# Patient Record
Sex: FEMALE | Race: WHITE | Employment: OTHER | URBAN - METROPOLITAN AREA
[De-identification: names, ages, dates, MRNs, and addresses within clinical notes are randomized per-mention and may not be internally consistent; named-entity substitution may affect disease eponyms.]

---

## 2017-01-03 ENCOUNTER — HOSPITAL ENCOUNTER (OUTPATIENT)
Dept: SLEEP CENTER | Age: 47
Discharge: HOME | End: 2017-02-01
Attending: NURSE PRACTITIONER | Admitting: NURSE PRACTITIONER

## 2018-07-23 ENCOUNTER — INITIAL CONSULT (OUTPATIENT)
Dept: CARDIOLOGY CLINIC | Age: 48
End: 2018-07-23

## 2018-07-23 VITALS
HEIGHT: 63 IN | WEIGHT: 258.6 LBS | SYSTOLIC BLOOD PRESSURE: 116 MMHG | BODY MASS INDEX: 45.82 KG/M2 | DIASTOLIC BLOOD PRESSURE: 68 MMHG | HEART RATE: 93 BPM

## 2018-07-23 DIAGNOSIS — Z72.0 TOBACCO ABUSE: Chronic | ICD-10-CM

## 2018-07-23 DIAGNOSIS — R07.2 PRECORDIAL PAIN: ICD-10-CM

## 2018-07-23 DIAGNOSIS — G47.30 SLEEP APNEA, UNSPECIFIED TYPE: ICD-10-CM

## 2018-07-23 DIAGNOSIS — R06.02 SOB (SHORTNESS OF BREATH): ICD-10-CM

## 2018-07-23 DIAGNOSIS — Z76.89 ESTABLISHING CARE WITH NEW DOCTOR, ENCOUNTER FOR: Primary | ICD-10-CM

## 2018-07-23 PROCEDURE — G8427 DOCREV CUR MEDS BY ELIG CLIN: HCPCS | Performed by: INTERNAL MEDICINE

## 2018-07-23 PROCEDURE — G8417 CALC BMI ABV UP PARAM F/U: HCPCS | Performed by: INTERNAL MEDICINE

## 2018-07-23 PROCEDURE — 93000 ELECTROCARDIOGRAM COMPLETE: CPT | Performed by: INTERNAL MEDICINE

## 2018-07-23 PROCEDURE — 99204 OFFICE O/P NEW MOD 45 MIN: CPT | Performed by: INTERNAL MEDICINE

## 2018-07-23 PROCEDURE — 3017F COLORECTAL CA SCREEN DOC REV: CPT | Performed by: INTERNAL MEDICINE

## 2018-07-23 RX ORDER — POTASSIUM CHLORIDE 20 MEQ/1
20 TABLET, EXTENDED RELEASE ORAL DAILY
Qty: 60 TABLET | Refills: 3 | Status: SHIPPED | OUTPATIENT
Start: 2018-07-23 | End: 2022-02-08

## 2018-07-23 RX ORDER — FUROSEMIDE 40 MG/1
40 TABLET ORAL 2 TIMES DAILY
Qty: 60 TABLET | Refills: 3 | Status: SHIPPED | OUTPATIENT
Start: 2018-07-23 | End: 2022-02-08

## 2018-07-23 NOTE — PROGRESS NOTES
LIGATION       Family History   Problem Relation Age of Onset    Cancer Mother     Cancer Father      Social History   Substance Use Topics    Smoking status: Current Every Day Smoker     Packs/day: 1.00     Years: 30.00     Types: Cigarettes    Smokeless tobacco: Never Used    Alcohol use No      Comment: occasional        Review of Systems:   · Constitutional: No Fever or Weight Loss   · Eyes: No Decreased Vision  · ENT: No Headaches, Hearing Loss or Vertigo  · Cardiovascular: as per note above   · Respiratory: No cough or wheezing and as per note above. Positive for sleep apnea  · Gastrointestinal: No abdominal pain, appetite loss, blood in stools, constipation, diarrhea or heartburn  · Genitourinary: No dysuria, trouble voiding, or hematuria  · Musculoskeletal:  None  · Integumentary: No rash or pruritis  · Neurological: No TIA or stroke symptoms  · Psychiatric: No anxiety or depression  · Endocrine: No malaise, fatigue or temperature intolerance  · Hematologic/Lymphatic: No bleeding problems, blood clots or swollen lymph nodes  · Allergic/Immunologic: No nasal congestion or hives    Objective:      Physical Exam:  /68   Pulse 93   Ht 5' 3\" (1.6 m)   Wt 258 lb 9.6 oz (117.3 kg)   BMI 45.81 kg/m²   Wt Readings from Last 3 Encounters:   07/23/18 258 lb 9.6 oz (117.3 kg)   05/29/18 246 lb (111.6 kg)   04/09/18 243 lb (110.2 kg)     Body mass index is 45.81 kg/m². Vitals:    07/23/18 1431   BP: 116/68   Pulse: 93        General Appearance:  No distress, conversant  Constitutional:  Well developed, Well nourished, No acute distress, Non-toxic appearance. HENT:  Normocephalic, Atraumatic, Bilateral external ears normal, Oropharynx moist, No oral exudates, Nose normal. Neck- Normal range of motion, No tenderness, Supple, No stridor,no apical-carotid delay  Eyes:  PERRL, EOMI, Conjunctiva normal, No discharge.    Respiratory:  Normal breath sounds, No respiratory distress, No wheezing, No chest

## 2018-08-02 ENCOUNTER — TELEPHONE (OUTPATIENT)
Dept: CARDIOLOGY CLINIC | Age: 48
End: 2018-08-02

## 2018-08-22 ENCOUNTER — TELEPHONE (OUTPATIENT)
Dept: CARDIOLOGY CLINIC | Age: 48
End: 2018-08-22

## 2018-09-21 ENCOUNTER — PROCEDURE VISIT (OUTPATIENT)
Dept: CARDIOLOGY CLINIC | Age: 48
End: 2018-09-21

## 2018-09-21 DIAGNOSIS — Z76.89 ESTABLISHING CARE WITH NEW DOCTOR, ENCOUNTER FOR: ICD-10-CM

## 2018-09-21 DIAGNOSIS — G47.30 SLEEP APNEA, UNSPECIFIED TYPE: ICD-10-CM

## 2018-09-21 DIAGNOSIS — R07.2 PRECORDIAL PAIN: ICD-10-CM

## 2018-09-21 DIAGNOSIS — R06.02 SOB (SHORTNESS OF BREATH): ICD-10-CM

## 2018-09-21 DIAGNOSIS — Z72.0 TOBACCO ABUSE: Chronic | ICD-10-CM

## 2018-09-21 LAB
LV EF: 77 %
LVEF MODALITY: NORMAL

## 2018-09-21 PROCEDURE — 93017 CV STRESS TEST TRACING ONLY: CPT | Performed by: INTERNAL MEDICINE

## 2018-09-21 PROCEDURE — A9500 TC99M SESTAMIBI: HCPCS | Performed by: INTERNAL MEDICINE

## 2018-09-21 PROCEDURE — 93016 CV STRESS TEST SUPVJ ONLY: CPT | Performed by: INTERNAL MEDICINE

## 2018-09-21 PROCEDURE — 78452 HT MUSCLE IMAGE SPECT MULT: CPT | Performed by: INTERNAL MEDICINE

## 2018-09-21 PROCEDURE — 93018 CV STRESS TEST I&R ONLY: CPT | Performed by: INTERNAL MEDICINE

## 2018-10-04 ENCOUNTER — TELEPHONE (OUTPATIENT)
Dept: CARDIOLOGY CLINIC | Age: 48
End: 2018-10-04

## 2018-10-15 ENCOUNTER — TELEPHONE (OUTPATIENT)
Dept: CARDIOLOGY CLINIC | Age: 48
End: 2018-10-15

## 2019-11-07 ENCOUNTER — HOSPITAL ENCOUNTER (OUTPATIENT)
Dept: SLEEP CENTER | Age: 49
Discharge: HOME | End: 2019-11-07
Payer: COMMERCIAL

## 2019-11-07 VITALS
HEIGHT: 63 IN | DIASTOLIC BLOOD PRESSURE: 60 MMHG | HEART RATE: 83 BPM | OXYGEN SATURATION: 96 % | BODY MASS INDEX: 46.95 KG/M2 | SYSTOLIC BLOOD PRESSURE: 126 MMHG | WEIGHT: 265 LBS

## 2019-11-07 DIAGNOSIS — R06.02 SOB (SHORTNESS OF BREATH) ON EXERTION: ICD-10-CM

## 2019-11-07 DIAGNOSIS — Z72.0 TOBACCO ABUSE: Chronic | ICD-10-CM

## 2019-11-07 DIAGNOSIS — G47.30 SLEEP APNEA, UNSPECIFIED TYPE: ICD-10-CM

## 2019-11-07 DIAGNOSIS — D64.9 ANEMIA, UNSPECIFIED TYPE: ICD-10-CM

## 2019-11-07 DIAGNOSIS — G25.81 RESTLESS LEG SYNDROME: ICD-10-CM

## 2019-11-07 DIAGNOSIS — R06.02 SOB (SHORTNESS OF BREATH): Primary | ICD-10-CM

## 2019-11-07 DIAGNOSIS — G47.19 EXCESSIVE DAYTIME SLEEPINESS: ICD-10-CM

## 2019-11-07 PROCEDURE — 99211 OFF/OP EST MAY X REQ PHY/QHP: CPT | Performed by: INTERNAL MEDICINE

## 2019-11-07 PROCEDURE — 99205 OFFICE O/P NEW HI 60 MIN: CPT | Performed by: INTERNAL MEDICINE

## 2019-11-07 RX ORDER — ROPINIROLE 0.25 MG/1
TABLET, FILM COATED ORAL
Qty: 45 TABLET | Refills: 1 | Status: SHIPPED | OUTPATIENT
Start: 2019-11-07 | End: 2020-02-07 | Stop reason: SDUPTHER

## 2019-11-07 RX ORDER — MELATONIN
Refills: 2 | COMMUNITY
Start: 2019-10-11

## 2019-11-07 RX ORDER — GABAPENTIN 100 MG/1
CAPSULE ORAL
Refills: 2 | COMMUNITY
Start: 2019-10-11 | End: 2022-02-08

## 2019-11-07 RX ORDER — LEVOTHYROXINE SODIUM 0.05 MG/1
TABLET ORAL NIGHTLY
Refills: 2 | COMMUNITY
Start: 2019-10-17

## 2019-11-07 RX ORDER — MELOXICAM 15 MG/1
TABLET ORAL
Refills: 2 | COMMUNITY
Start: 2019-10-11

## 2019-11-07 RX ORDER — ALBUTEROL SULFATE 90 UG/1
AEROSOL, METERED RESPIRATORY (INHALATION)
Refills: 0 | COMMUNITY
Start: 2019-10-17 | End: 2022-02-08

## 2019-11-07 ASSESSMENT — SLEEP AND FATIGUE QUESTIONNAIRES
HOW LIKELY ARE YOU TO NOD OFF OR FALL ASLEEP WHILE LYING DOWN TO REST IN THE AFTERNOON WHEN CIRCUMSTANCES PERMIT: 3
HOW LIKELY ARE YOU TO NOD OFF OR FALL ASLEEP WHILE SITTING QUIETLY AFTER LUNCH WITHOUT ALCOHOL: 2
HOW LIKELY ARE YOU TO NOD OFF OR FALL ASLEEP IN A CAR, WHILE STOPPED FOR A FEW MINUTES IN TRAFFIC: 0
ESS TOTAL SCORE: 12
HOW LIKELY ARE YOU TO NOD OFF OR FALL ASLEEP WHILE WATCHING TV: 3
HOW LIKELY ARE YOU TO NOD OFF OR FALL ASLEEP WHILE SITTING AND TALKING TO SOMEONE: 2
HOW LIKELY ARE YOU TO NOD OFF OR FALL ASLEEP WHEN YOU ARE A PASSENGER IN A CAR FOR AN HOUR WITHOUT A BREAK: 2
HOW LIKELY ARE YOU TO NOD OFF OR FALL ASLEEP WHILE SITTING INACTIVE IN A PUBLIC PLACE: 0
HOW LIKELY ARE YOU TO NOD OFF OR FALL ASLEEP WHILE SITTING AND READING: 0

## 2019-11-14 ENCOUNTER — HOSPITAL ENCOUNTER (OUTPATIENT)
Dept: SLEEP CENTER | Age: 49
Discharge: HOME | End: 2019-11-14
Payer: COMMERCIAL

## 2019-11-14 DIAGNOSIS — G47.30 SLEEP APNEA, UNSPECIFIED TYPE: ICD-10-CM

## 2019-11-14 PROCEDURE — 95810 POLYSOM 6/> YRS 4/> PARAM: CPT | Performed by: INTERNAL MEDICINE

## 2019-11-14 PROCEDURE — 95810 POLYSOM 6/> YRS 4/> PARAM: CPT

## 2019-11-21 LAB — STATUS: NORMAL

## 2019-11-27 ENCOUNTER — HOSPITAL ENCOUNTER (OUTPATIENT)
Dept: SLEEP CENTER | Age: 49
Discharge: HOME | End: 2019-11-27
Payer: COMMERCIAL

## 2019-11-27 DIAGNOSIS — G25.81 RESTLESS LEG SYNDROME: ICD-10-CM

## 2019-11-27 DIAGNOSIS — G47.19 EXCESSIVE DAYTIME SLEEPINESS: ICD-10-CM

## 2019-11-27 DIAGNOSIS — G47.33 OSA (OBSTRUCTIVE SLEEP APNEA): ICD-10-CM

## 2019-11-27 DIAGNOSIS — D64.9 ANEMIA, UNSPECIFIED TYPE: ICD-10-CM

## 2019-11-27 DIAGNOSIS — Z72.0 TOBACCO ABUSE: Chronic | ICD-10-CM

## 2019-11-27 DIAGNOSIS — R06.02 SOB (SHORTNESS OF BREATH): ICD-10-CM

## 2019-11-27 PROCEDURE — 9990000010 HC NO CHARGE VISIT: Performed by: INTERNAL MEDICINE

## 2019-11-27 PROCEDURE — 99215 OFFICE O/P EST HI 40 MIN: CPT | Performed by: INTERNAL MEDICINE

## 2019-11-27 RX ORDER — CETIRIZINE HYDROCHLORIDE 10 MG/1
TABLET ORAL
Refills: 1 | COMMUNITY
Start: 2019-11-22 | End: 2022-02-08

## 2019-11-27 RX ORDER — TIOTROPIUM BROMIDE INHALATION SPRAY 3.12 UG/1
SPRAY, METERED RESPIRATORY (INHALATION)
Refills: 2 | COMMUNITY
Start: 2019-11-22

## 2019-11-27 ASSESSMENT — ENCOUNTER SYMPTOMS
COUGH: 0
EYE ITCHING: 0
ABDOMINAL PAIN: 0
ABDOMINAL DISTENTION: 0
SHORTNESS OF BREATH: 0
BACK PAIN: 0
EYE DISCHARGE: 0

## 2020-02-07 RX ORDER — ROPINIROLE 0.25 MG/1
TABLET, FILM COATED ORAL
Qty: 45 TABLET | Refills: 1 | Status: SHIPPED | OUTPATIENT
Start: 2020-02-07 | End: 2020-05-20 | Stop reason: SDUPTHER

## 2020-03-03 ENCOUNTER — HOSPITAL ENCOUNTER (OUTPATIENT)
Dept: SLEEP CENTER | Age: 50
Discharge: HOME | End: 2020-03-03
Payer: COMMERCIAL

## 2020-03-03 PROCEDURE — 95811 POLYSOM 6/>YRS CPAP 4/> PARM: CPT

## 2020-03-03 PROCEDURE — 95811 POLYSOM 6/>YRS CPAP 4/> PARM: CPT | Performed by: INTERNAL MEDICINE

## 2020-03-04 LAB — STATUS: NORMAL

## 2020-03-06 ENCOUNTER — TELEPHONE (OUTPATIENT)
Dept: SLEEP CENTER | Age: 50
End: 2020-03-06

## 2020-05-22 RX ORDER — ROPINIROLE 0.25 MG/1
TABLET, FILM COATED ORAL
Qty: 45 TABLET | Refills: 1 | Status: SHIPPED | OUTPATIENT
Start: 2020-05-22 | End: 2020-08-19 | Stop reason: SDUPTHER

## 2020-06-15 ENCOUNTER — TELEPHONE (OUTPATIENT)
Dept: PULMONOLOGY | Age: 50
End: 2020-06-15

## 2020-07-09 ENCOUNTER — HOSPITAL ENCOUNTER (OUTPATIENT)
Dept: SLEEP CENTER | Age: 50
Discharge: HOME | End: 2020-07-09
Payer: COMMERCIAL

## 2020-07-09 PROCEDURE — 99215 OFFICE O/P EST HI 40 MIN: CPT | Performed by: INTERNAL MEDICINE

## 2020-07-09 PROCEDURE — 9990000010 HC NO CHARGE VISIT

## 2020-07-09 RX ORDER — TRAMADOL HYDROCHLORIDE 50 MG/1
50 TABLET ORAL EVERY 12 HOURS
Status: ON HOLD | COMMUNITY
Start: 2020-05-14 | End: 2022-02-22 | Stop reason: HOSPADM

## 2020-07-09 ASSESSMENT — SLEEP AND FATIGUE QUESTIONNAIRES
HOW LIKELY ARE YOU TO NOD OFF OR FALL ASLEEP WHILE SITTING QUIETLY AFTER LUNCH WITHOUT ALCOHOL: 1
HOW LIKELY ARE YOU TO NOD OFF OR FALL ASLEEP WHEN YOU ARE A PASSENGER IN A CAR FOR AN HOUR WITHOUT A BREAK: 0
ESS TOTAL SCORE: 9
HOW LIKELY ARE YOU TO NOD OFF OR FALL ASLEEP WHILE SITTING INACTIVE IN A PUBLIC PLACE: 0
HOW LIKELY ARE YOU TO NOD OFF OR FALL ASLEEP WHILE WATCHING TV: 2
HOW LIKELY ARE YOU TO NOD OFF OR FALL ASLEEP WHILE SITTING AND TALKING TO SOMEONE: 1
HOW LIKELY ARE YOU TO NOD OFF OR FALL ASLEEP WHILE SITTING AND READING: 2
HOW LIKELY ARE YOU TO NOD OFF OR FALL ASLEEP IN A CAR, WHILE STOPPED FOR A FEW MINUTES IN TRAFFIC: 0
HOW LIKELY ARE YOU TO NOD OFF OR FALL ASLEEP WHILE LYING DOWN TO REST IN THE AFTERNOON WHEN CIRCUMSTANCES PERMIT: 3

## 2020-07-09 ASSESSMENT — ENCOUNTER SYMPTOMS
SHORTNESS OF BREATH: 0
COUGH: 0
EYE ITCHING: 0
EYE DISCHARGE: 0
ABDOMINAL DISTENTION: 0
BACK PAIN: 0
ABDOMINAL PAIN: 0

## 2020-07-09 NOTE — PROGRESS NOTES
[facility]  1970  Referring Provider: Dr. Alin Aguilar    Subjective:     Chief Complaint   Patient presents with    Sleep Apnea    3 Month Follow-Up       HPI  Em Pena is a 44 y.o. female is doing a telephone visit. She has been diagnosed with mild ROXI with EDS. She is on a Auto CPAP which she is using it off and on for about 3 hours. She has a nasal mask. She has no loss of weight. She has difficulty tolerating the nasal mask. She is not sleepy during the day time. Her 2 week download data showed that her residual AHI is 7.4 and leak is 35.9 and 95th percentile pressure is 11.7. She has a 41 pk yr smoking and she is still smoking 1 pk per day. She had no PFT's. She has no symptoms. She has RLS for which she is on Requip and it is helping her. She has no blood work.     Current Outpatient Medications   Medication Sig Dispense Refill    traMADol (ULTRAM) 50 MG tablet       rOPINIRole (REQUIP) 0.25 MG tablet Take 1 tablet orally 2 hours before bed time and then increase by 1 tablet per week to a max 8 tabs per day 45 tablet 1    cetirizine (ZYRTEC) 10 MG tablet   1    SPIRIVA RESPIMAT 2.5 MCG/ACT AERS inhaler   2    albuterol sulfate  (90 Base) MCG/ACT inhaler   0    Cholecalciferol (VITAMIN D3) 25 MCG (1000 UT) TABS   2    gabapentin (NEURONTIN) 100 MG capsule   2    levothyroxine (SYNTHROID) 50 MCG tablet   2    meloxicam (MOBIC) 15 MG tablet   2    FLUoxetine (PROZAC) 20 MG capsule Take 20 mg by mouth daily      ibuprofen (ADVIL;MOTRIN) 600 MG tablet Take 1 tablet by mouth every 6 hours as needed for Pain or Fever 60 tablet 0    acetaminophen (TYLENOL) 325 MG tablet Take 2 tablets by mouth every 6 hours as needed for Pain 60 tablet 0    furosemide (LASIX) 40 MG tablet Take 1 tablet by mouth 2 times daily 60 tablet 3    potassium chloride (KLOR-CON M) 20 MEQ extended release tablet Take 1 tablet by mouth daily 60 tablet 3    zolpidem (AMBIEN) 5 MG tablet Take 5 mg by mouth nightly as needed for Sleep. Vero Rife LORazepam (ATIVAN) 0.5 MG tablet Take 0.5 mg by mouth every 6 hours as needed for Anxiety. Vero Rife ibuprofen (ADVIL;MOTRIN) 600 MG tablet Take 1 tablet by mouth every 8 hours as needed for Pain 30 tablet 0    Compression Stockings MISC by Does not apply route 1 each 0    Spacer/Aero Chamber Mouthpiece MISC Use with inhaler as needed. 1 each 0     No current facility-administered medications for this encounter.         No Known Allergies    Past Medical History:   Diagnosis Date    Anxiety     COPD [facility]chronic obstructive pulmonary disease) ([facility])     Panic attacks     Separation anxiety     TIA (transient ischemic attack)        Past Surgical History:   Procedure Laterality Date     SECTION      CHOLECYSTECTOMY      HYSTERECTOMY      KNEE CARTILAGE SURGERY      R knee 2002    TUBAL LIGATION         Social History     Socioeconomic History    Marital status:      Spouse name: Not on file    Number of children: Not on file    Years of education: Not on file    Highest education level: Not on file   Occupational History    Not on file   Social Needs    Financial resource strain: Not on file    Food insecurity     Worry: Not on file     Inability: Not on file    Transportation needs     Medical: Not on file     Non-medical: Not on file   Tobacco Use    Smoking status: Current Every Day Smoker     Packs/day: 1.00     Years: 30.00     Pack years: 30.00     Types: Cigarettes    Smokeless tobacco: Never Used   Substance and Sexual Activity    Alcohol use: No     Comment: occasional    Drug use: No    Sexual activity: Not on file   Lifestyle    Physical activity     Days per week: Not on file     Minutes per session: Not on file    Stress: Not on file   Relationships    Social connections     Talks on phone: Not on file     Gets together: Not on file     Attends L[employer]-day service: Not on file     Active member of club or organization: Not on file Attends meetings of clubs or organizations: Not on file     Relationship status: Not on file    Intimate partner violence     Fear of current or ex partner: Not on file     Emotionally abused: Not on file     Physically abused: Not on file     Forced sexual activity: Not on file   Other Topics Concern    Not on file   Social History Narrative    Not on file       Review of Systems   Constitutional: Positive for fatigue. HENT: Negative for congestion and postnasal drip. Eyes: Negative for discharge and itching. Respiratory: Negative for cough and shortness of breath. Cardiovascular: Negative for chest pain and leg swelling. Gastrointestinal: Negative for abdominal distention and abdominal pain. Endocrine: Negative for cold intolerance and heat intolerance. Genitourinary: Negative for enuresis and frequency. Musculoskeletal: Negative for arthralgias and back pain. Allergic/Immunologic: Negative for environmental allergies and food allergies. Neurological: Negative for light-headedness and headaches. Hematological: Negative for adenopathy. Psychiatric/Behavioral: Negative for agitation and behavioral problems. Objective: There were no vitals taken for this visit. There is no height or weight on file to calculate BMI. Sleep Medicine 7/9/2020 11/7/2019   Sitting and reading 2 0   Watching TV 2 3   Sitting, inactive in a public place (e.g. a theatre or a meeting) 0 0   As a passenger in a car for an hour without a break 0 2   Lying down to rest in the afternoon when circumstances permit 3 3   Sitting and talking to someone 1 2   Sitting quietly after a lunch without alcohol 1 2   In a car, while stopped for a few minutes in traffic 0 0   Total score 9 12   Neck circumference - 18.5     {MALLAMPATI:3    Physical Exam  Vitals signs reviewed. Constitutional:       Appearance: Normal appearance. Comments: Morbid obesity   HENT:      Head: Normocephalic and atraumatic.       Nose: Nose normal.      Mouth/Throat:      Mouth: Mucous membranes are moist.   Eyes:      Extraocular Movements: Extraocular movements intact. Pupils: Pupils are equal, round, and reactive to light. Neck:      Musculoskeletal: Normal range of motion and neck supple. Cardiovascular:      Rate and Rhythm: Normal rate and regular rhythm. Pulses: Normal pulses. Heart sounds: Normal heart sounds. Pulmonary:      Effort: Pulmonary effort is normal.      Breath sounds: Normal breath sounds. Abdominal:      General: Abdomen is flat. Palpations: Abdomen is soft. Musculoskeletal: Normal range of motion. Skin:     General: Skin is warm and dry. Neurological:      General: No focal deficit present. Mental Status: She is alert and oriented to person, place, and time. Psychiatric:         Mood and Affect: Mood normal.         Behavior: Behavior normal.         Radiology: none    Assessment and Plan     Problem List        Pulmonary Problems    SOB (shortness of breath)     Quit smoking  Loose weight  PFT         SOB (shortness of breath) on exertion    Relevant Orders    Full PFT Study With Bronchodilator    ROXI (obstructive sleep apnea)     Advised to be compliant with the CPAP  Mask fitting trial  Loose weight            Other    Tobacco abuse (Chronic)     Advised to quit smoking         Relevant Orders    Full PFT Study With Bronchodilator    Excessive daytime sleepiness     Advised to be compliant with the CPAP  Loose weight         Anemia     I'll do the blood work for the sec causes of RLS         Restless leg syndrome     Advised to be compliant with Requip                    No follow-ups on file.      Progress notes sent to the referring Provider    Viola Sutton MD  7/9/2020  9:51 AM

## 2020-08-20 RX ORDER — ROPINIROLE 0.25 MG/1
TABLET, FILM COATED ORAL
Qty: 45 TABLET | Refills: 1 | Status: SHIPPED | OUTPATIENT
Start: 2020-08-20 | End: 2020-09-16

## 2020-09-16 RX ORDER — ROPINIROLE 0.25 MG/1
TABLET, FILM COATED ORAL
Qty: 45 TABLET | Refills: 1 | Status: SHIPPED | OUTPATIENT
Start: 2020-09-16 | End: 2020-12-21 | Stop reason: SDUPTHER

## 2020-12-21 NOTE — TELEPHONE ENCOUNTER
Patient called asking for a refill of Requip sent to [facility] on [address].

## 2020-12-22 RX ORDER — ROPINIROLE 0.25 MG/1
TABLET, FILM COATED ORAL
Qty: 45 TABLET | Refills: 1 | Status: SHIPPED | OUTPATIENT
Start: 2020-12-22 | End: 2021-02-01 | Stop reason: SDUPTHER

## 2021-02-01 NOTE — TELEPHONE ENCOUNTER
Patient called stating that she needs a refill of the Requip, BUT she states that she is up to 0.75mg nightly instead of the 0.25mg that was called in like last time.

## 2021-02-02 RX ORDER — ROPINIROLE 0.25 MG/1
TABLET, FILM COATED ORAL
Qty: 45 TABLET | Refills: 1 | Status: SHIPPED | OUTPATIENT
Start: 2021-02-02 | End: 2021-04-08

## 2021-04-08 RX ORDER — ROPINIROLE 0.25 MG/1
TABLET, FILM COATED ORAL
Qty: 90 TABLET | Refills: 1 | Status: SHIPPED | OUTPATIENT
Start: 2021-04-08 | End: 2022-03-08 | Stop reason: SDUPTHER

## 2021-06-18 ENCOUNTER — OFFICE VISIT (OUTPATIENT)
Dept: ORTHOPEDIC SURGERY | Age: 51
End: 2021-06-18
Payer: MEDICARE

## 2021-06-18 VITALS
BODY MASS INDEX: 45.18 KG/M2 | HEART RATE: 80 BPM | OXYGEN SATURATION: 97 % | HEIGHT: 63 IN | WEIGHT: 255 LBS | RESPIRATION RATE: 16 BRPM

## 2021-06-18 DIAGNOSIS — M17.11 PRIMARY OSTEOARTHRITIS OF RIGHT KNEE: Primary | ICD-10-CM

## 2021-06-18 PROCEDURE — 99203 OFFICE O/P NEW LOW 30 MIN: CPT | Performed by: PHYSICIAN ASSISTANT

## 2021-06-18 PROCEDURE — 4004F PT TOBACCO SCREEN RCVD TLK: CPT | Performed by: PHYSICIAN ASSISTANT

## 2021-06-18 PROCEDURE — G8417 CALC BMI ABV UP PARAM F/U: HCPCS | Performed by: PHYSICIAN ASSISTANT

## 2021-06-18 PROCEDURE — 3017F COLORECTAL CA SCREEN DOC REV: CPT | Performed by: PHYSICIAN ASSISTANT

## 2021-06-18 PROCEDURE — G8427 DOCREV CUR MEDS BY ELIG CLIN: HCPCS | Performed by: PHYSICIAN ASSISTANT

## 2021-06-18 NOTE — PROGRESS NOTES
[facility]    HPI:  Adan Rust is a 46 y.o. female who presents for evaluation of her right knee pain. Patient states she has pain in her right knee for the last couple of months. She rates her pain 5-6 and describes as achy sharp sensation. Patient states she has tried tramadol, meloxicam, ice, heat which does help at times. She states standing and walking worsen her pain just.  She states she continues to note popping and cracking sensations of her right knee. She states she did have previous knee arthroscopy completed at least 10 years ago by Dr. Suresh Marcos. She denies any new injury to her right knee. The patient was referred by Den Chen MD for evaluation of patient's right knee pain.   Thank you for the referral.    Past Medical History:   Diagnosis Date    Anxiety     COPD (chronic obstructive pulmonary disease) (H[facility])     Panic attacks     Separation anxiety     TIA (transient ischemic attack)      Past Surgical History:   Procedure Laterality Date     SECTION      CHOLECYSTECTOMY      HYSTERECTOMY      KNEE CARTILAGE SURGERY      R knee 2002    TUBAL LIGATION        Family History   Problem Relation Age of Onset    Cancer Mother     Cancer Father      Social History     Socioeconomic History    Marital status:      Spouse name: None    Number of children: None    Years of education: None    Highest education level: None   Occupational History    None   Tobacco Use    Smoking status: Current Every Day Smoker     Packs/day: 1.00     Years: 30.00     Pack years: 30.00     Types: Cigarettes    Smokeless tobacco: Never Used   Substance and Sexual Activity    Alcohol use: No     Comment: occasional    Drug use: No    Sexual activity: None   Other Topics Concern    None   Social History Narrative    None     Social Determinants of Health     Financial Resource Strain:     Difficulty of Paying Living Expenses:    Food (ATIVAN) 0.5 MG tablet Take 0.5 mg by mouth every 6 hours as needed for Anxiety. Duwaine Blanks ibuprofen (ADVIL;MOTRIN) 600 MG tablet Take 1 tablet by mouth every 8 hours as needed for Pain 30 tablet 0    Compression Stockings MISC by Does not apply route 1 each 0    Spacer/Aero Chamber Mouthpiece MISC Use with inhaler as needed. 1 each 0     No current facility-administered medications for this visit. No Known Allergies    Review of Systems:   Review of Systems   Constitutional: Negative. HENT: Negative. Eyes: Negative. Respiratory: Negative. Cardiovascular: Negative. Gastrointestinal: Negative. Endocrine: Negative. Genitourinary: Negative. Musculoskeletal: Positive for arthralgias. Skin: Negative. Allergic/Immunologic: Negative. Neurological: Negative. Hematological: Negative. Psychiatric/Behavioral: Negative. Physical Exam:  Pulse 80   Resp 16   Ht 5' 3\" (1.6 m)   Wt 255 lb (115.7 kg)   SpO2 97%   BMI 45.17 kg/m²      The patient can bear weight on the injured extremity. Gen/Psych: Examination reveals a pleasant individual in no acute distress. The patient is oriented to time, place, and person. The patient's mood and affect are appropriate. Patient appears well nourished. Lymph: No obvious lymphedema in right lower extremity     Skin: Intact in right lower extremity with no ulcerations, lesions, rash, erythema. Vascular: There are no obvious varicosities in right lower extremity, sensation present to light touch over right lower extremity. Capillary refill less than 3. Musculoskeletal:  right leg/knee exam:  Inspection:    No erythema or ecchymosis. No swelling noted.   Leg alignment:     neutral  Quadriceps/hamstring atrophy:   no  Knee effusion:    no   ROM:      degrees  Lachman:    no  Ant/Posterior drawer:   no  Lateral patella glide at 30 deg's: 20%  Medial patella glide at 30 deg's: 10mm  Varus laxity at 0 and 30 deg's: no  Valguslaxity at 0 and 30 deg's: no  Tenderness at:   Tender to palpation of the lateral joint line region. Nontender palpation of posterior calf, soft compartments. Knee strength is 5/5 flexion and extension  No pain with hip internal rotation and external rotation. Patient can perform ankle dorsiflexion and plantarflexion. Imagin views of the right knee were obtained today which showed no acute fracture or dislocation. The official read and interpretation of these x-rays will be done by the the [facility]. Please see their impression below. Impression   1. No acute bony abnormality   2. Tricompartmental osteoarthritic changes   3. Joint effusion          Impression:   1. Primary osteoarthritis of the right knee    Plan:   The patient was seen in clinic for evaluation of her right knee pain. Patient states she does have history of previous knee arthroscopy completed at least 10 years ago. Patient states she developed knee pain over the last couple month and states walking and standing worsen her pain. X-ray imaging of the patient's right knee was obtained which showed no acute fracture or dislocation. On physical exam, no erythema or ecchymosis was seen. Patient was tender to palpation of the lateral joint line region of the right knee. Capillary refill less than 3. Sensation intact to light touch. Treatment options were in the patient's right knee pain were discussed including anti-inflammatory use, ice use, bracing, steroid injection, gel injection, and surgery. Patient states she would like to discuss her surgical options further. The patient will follow up with Dr. Shadi Licea.     Continue to weight bear as tolerated  Continue range of motion as tolerated  Ice and elevate as needed  May take Tylenol or Motrin for pain as needed  Follow-up with Dr. Shadi Licea     Please note this report has been partially produced using speech recognition software and may contain errors related to that system including errors in grammar, punctuation, and spelling, as well as words and phrases that may be inappropriate. If there are any questions or concerns please feel free to contact the dictating provider for clarification.

## 2021-06-18 NOTE — PROGRESS NOTES
Patient is a 46year old female that presents to the office with complaints of right knee pain. Onset: 2-3 months with NKI. Pain is rated in office today at a 6/10 globally throughout the knee with patient describing her pain as a sharp. Pain is aggravated by transitioning from seated to standing position and ambulation. Associated sx: grinding within the knee and the knee giving out with increased activity. She has been conservatively treating her symptoms by the use of Meloxicam and Tramadol as prescribed by her PCP, Dr. Lizett Ryan at [facility] along with ice and heat application, rest, and elevation with no reported relief. Hx of previous knee arthroscopy performed several years ago by Dr. Yang Harper on the extremity. She is open to other treatment options at this time.

## 2021-06-20 ASSESSMENT — ENCOUNTER SYMPTOMS
RESPIRATORY NEGATIVE: 1
GASTROINTESTINAL NEGATIVE: 1
EYES NEGATIVE: 1
ALLERGIC/IMMUNOLOGIC NEGATIVE: 1

## 2021-07-20 ENCOUNTER — OFFICE VISIT (OUTPATIENT)
Dept: ORTHOPEDIC SURGERY | Age: 51
End: 2021-07-20
Payer: MEDICARE

## 2021-07-20 VITALS
HEIGHT: 63 IN | WEIGHT: 255 LBS | HEART RATE: 77 BPM | BODY MASS INDEX: 45.18 KG/M2 | OXYGEN SATURATION: 95 % | RESPIRATION RATE: 16 BRPM

## 2021-07-20 DIAGNOSIS — M17.11 PRIMARY OSTEOARTHRITIS OF RIGHT KNEE: Primary | ICD-10-CM

## 2021-07-20 PROCEDURE — G8428 CUR MEDS NOT DOCUMENT: HCPCS | Performed by: ORTHOPAEDIC SURGERY

## 2021-07-20 PROCEDURE — 3017F COLORECTAL CA SCREEN DOC REV: CPT | Performed by: ORTHOPAEDIC SURGERY

## 2021-07-20 PROCEDURE — G8417 CALC BMI ABV UP PARAM F/U: HCPCS | Performed by: ORTHOPAEDIC SURGERY

## 2021-07-20 PROCEDURE — 4004F PT TOBACCO SCREEN RCVD TLK: CPT | Performed by: ORTHOPAEDIC SURGERY

## 2021-07-20 PROCEDURE — 99214 OFFICE O/P EST MOD 30 MIN: CPT | Performed by: ORTHOPAEDIC SURGERY

## 2021-07-20 PROCEDURE — 20610 DRAIN/INJ JOINT/BURSA W/O US: CPT | Performed by: ORTHOPAEDIC SURGERY

## 2021-07-20 NOTE — PROGRESS NOTES
2021   Chief Complaint   Patient presents with    Knee Pain     right knee        History of Present Illness:                             Clau Bauer is a 46 y.o. female who presents today for evaluation of her right knee pain swelling and stiffness. She has previously been seen by the physician assistant referred here for further discussion of treatment options for her degenerative knee condition. She has a remote history of knee arthroscopy performed 10 years ago. She has had ongoing worsening symptoms since then seem to progress in nature especially over the last 6 months. Pain is most severe along the medial joint line and worse with weightbearing activities and deep knee flexion. She has tried to rest and anti-inflammatory medications with no relief of her symptoms. Patient presents to the office today for evaluation of the right knee. Pt states on set of pain a few months ago. Pt states pain today is a 8/10 in the right knee. She has tried tramadol, meloxicam, ice, heat with mild relief. Pt states she has a difficult time going up the stairs and getting in out of the car. Pt states she is in constant stabbing pain along the medial and lateral aspect of the the right knee. Pt states she is ready to discuss a total knee replacement         Medical History  Patient's medications, allergies, past medical, surgical, social and family histories were reviewed and updated as appropriate.     Past Medical History:   Diagnosis Date    Anxiety     COPD (chronic obstructive pulmonary disease) (HCC)     Panic attacks     Separation anxiety     TIA (transient ischemic attack)      Past Surgical History:   Procedure Laterality Date     SECTION      CHOLECYSTECTOMY      HYSTERECTOMY      KNEE CARTILAGE SURGERY      R knee 2002    TUBAL LIGATION       Family History   Problem Relation Age of Onset    Cancer Mother     Cancer Father      Social History     Socioeconomic History  Marital status:      Spouse name: None    Number of children: None    Years of education: None    Highest education level: None   Occupational History    None   Tobacco Use    Smoking status: Current Every Day Smoker     Packs/day: 1.00     Years: 30.00     Pack years: 30.00     Types: Cigarettes    Smokeless tobacco: Never Used   Substance and Sexual Activity    Alcohol use: No     Comment: occasional    Drug use: No    Sexual activity: None   Other Topics Concern    None   Social History Narrative    None     Social Determinants of Health     Financial Resource Strain:     Difficulty of Paying Living Expenses:    Food Insecurity:     Worried About Running Out of Food in the Last Year:     Ran Out of Food in the Last Year:    Transportation Needs:     Lack of Transportation (Medical):      Lack of Transportation (Non-Medical):    Physical Activity:     Days of Exercise per Week:     Minutes of Exercise per Session:    Stress:     Feeling of Stress :    Social Connections:     Frequency of Communication with Friends and Family:     Frequency of Social Gatherings with Friends and Family:     Attends Scientologist Services:     Active Member of Clubs or Organizations:     Attends Club or Organization Meetings:     Marital Status:    Intimate Partner Violence:     Fear of Current or Ex-Partner:     Emotionally Abused:     Physically Abused:     Sexually Abused:      Current Outpatient Medications   Medication Sig Dispense Refill    rOPINIRole (REQUIP) 0.25 MG tablet TAKE 3 TABLETS BY MOUTH 2 HOURS BEFORE BED 90 tablet 1    traMADol (ULTRAM) 50 MG tablet       cetirizine (ZYRTEC) 10 MG tablet   1    SPIRIVA RESPIMAT 2.5 MCG/ACT AERS inhaler   2    albuterol sulfate  (90 Base) MCG/ACT inhaler   0    Cholecalciferol (VITAMIN D3) 25 MCG (1000 UT) TABS   2    gabapentin (NEURONTIN) 100 MG capsule  (Patient not taking: Reported on 6/18/2021)  2    levothyroxine (SYNTHROID) 50 MCG tablet   2    meloxicam (MOBIC) 15 MG tablet   2    ibuprofen (ADVIL;MOTRIN) 600 MG tablet Take 1 tablet by mouth every 6 hours as needed for Pain or Fever 60 tablet 0    acetaminophen (TYLENOL) 325 MG tablet Take 2 tablets by mouth every 6 hours as needed for Pain 60 tablet 0    furosemide (LASIX) 40 MG tablet Take 1 tablet by mouth 2 times daily 60 tablet 3    potassium chloride (KLOR-CON M) 20 MEQ extended release tablet Take 1 tablet by mouth daily 60 tablet 3    FLUoxetine (PROZAC) 20 MG capsule Take 20 mg by mouth daily      zolpidem (AMBIEN) 5 MG tablet Take 5 mg by mouth nightly as needed for Sleep. Buhl Ambrosia LORazepam (ATIVAN) 0.5 MG tablet Take 0.5 mg by mouth every 6 hours as needed for Anxiety. Rere Ambrosia ibuprofen (ADVIL;MOTRIN) 600 MG tablet Take 1 tablet by mouth every 8 hours as needed for Pain 30 tablet 0    Compression Stockings MISC by Does not apply route 1 each 0    Spacer/Aero Chamber Mouthpiece MISC Use with inhaler as needed. 1 each 0     No current facility-administered medications for this visit. No Known Allergies      Review of Systems   Constitutional: Negative for chills and fever. HENT: Negative for congestion and sneezing. Eyes: Negative for pain and redness. Respiratory: Negative for chest tightness, shortness of breath and wheezing. Cardiovascular: Negative for chest pain and palpitations. Gastrointestinal: Negative for vomiting. Musculoskeletal: Positive for arthralgias. Skin: Negative for color change and rash. Neurological: Negative for weakness and numbness. Psychiatric/Behavioral: Negative for agitation. The patient is not nervous/anxious. Examination:  General Exam:  Vitals: Pulse 77   Resp 16   Ht 5' 3\" (1.6 m)   Wt 255 lb (115.7 kg)   SpO2 95%   BMI 45.17 kg/m²    Physical Exam  Vitals and nursing note reviewed. Constitutional:       Appearance: Normal appearance.    HENT:      Head: Normocephalic and atraumatic. Eyes:      Conjunctiva/sclera: Conjunctivae normal.      Pupils: Pupils are equal, round, and reactive to light. Pulmonary:      Effort: Pulmonary effort is normal.   Musculoskeletal:      Cervical back: Normal range of motion. Right hip: No deformity, tenderness, bony tenderness or crepitus. Normal range of motion. Normal strength. Left hip: Normal.      Left knee: No swelling, deformity, effusion, ecchymosis or lacerations. Normal range of motion. No tenderness. No medial joint line or lateral joint line tenderness. No LCL laxity or MCL laxity. Normal alignment and normal meniscus. Comments: Right Lower Extremity:    There is moderate to severe tenderness to palpation diffusely throughout the knee, most significant along the medial joint line. There is a moderate knee joint effusion present and mild global swelling anteriorly. Mild restricted range of motion at the knee with approximately 5 degrees lack of full extension and knee flexion up to 120 degrees with pain at the extremes of motion. There is mild crepitation during active range of motion. There is mild varus knee alignment. There is 5 out of 5 strength with knee flexion and extension. There is no instability to varus or valgus stress testing and anterior and posterior drawer testing. Sensation is intact to light touch throughout the lower extremity. There is positive medial Elidia's test with tenderness to palpation and pain along the medial joint line. Skin is intact. Pulses are intact    No pain with active range of motion of the hip. Strength and range of motion of the hip are intact. No tenderness to palpation at the hip. Skin:     General: Skin is warm and dry. Neurological:      Mental Status: She is alert and oriented to person, place, and time.    Psychiatric:         Mood and Affect: Mood normal.         Behavior: Behavior normal.            Diagnostic testing:  X-ray images were reviewed by myself and discussed with the patient:  X-ray images of the right knee from 6/18/2021 were reviewed by myself discussed the patient:  FINDINGS:   Alignment is anatomic.  No fractures or destructive bony abnormalities are   seen.  Tricompartmental osteoarthritic changes are noted.  Joint effusion.           Impression   1. No acute bony abnormality   2. Tricompartmental osteoarthritic changes   3. Joint effusion             Office Procedures:  No orders of the defined types were placed in this encounter. Assessment and Plan  1. Right knee primary osteoarthritis    I discussed the degenerative findings of the right knee on x-ray and exam with the patient. I have recommended maximizing conservative treatments for the arthritic knee condition. We discussed the use of steroid injections as needed for severe symptoms. Currently patient is having significant pain on a daily basis and this is impacting activities of daily living and ability to get comfortable at rest.  The patient would like to have an injection performed today. Procedure Note, Right Knee Intraarticular Injection:  The right knee was prepped with alcohol and injected intra-articularly with 80 mg of Kenalog and 4 mL of 1% lidocaine through a 22-gauge needle. Sterile Band-Aid was applied. The patient tolerated it well without complications. I have recommended weight loss and maintaining a healthy weight to decrease the forces across the degenerative knee joint. I recommended that she lose weight prior to considering moving forward with elective total knee replacement. We discussed the importance of maintaining flexibility and strength at the knee. I have advised the patient to have a home exercise program that includes stretching and low impact activities such as walking, biking, or elliptical machines. We discussed the possibility of physical therapy.   The patient would like to defer formal physical therapy at this time.  They will call if they would like to have a referral sent. I instructed the patient on the use of over-the-counter anti-inflammatory medications.     Follow-up in 8 weeks for check of the response to the injection and home exercise program.          Electronically signed by Manuela Antoine MD on 7/20/2021 at 10:44 AM

## 2021-07-20 NOTE — PROGRESS NOTES
Patient presents to the office today for evaluation of the right knee. Pt states on set of pain a few months ago. Pt states pain today is a /10 in the right knee. She has tried tramadol, meloxicam, ice, heat with mild relief. Pt states she has a difficult time going up the stairs and getting in out of the car. Pt states she is in constant stabbing pain along the medial and lateral aspect of the the right knee.  Pt states she is ready to discuss a total knee replacement

## 2021-07-24 ASSESSMENT — ENCOUNTER SYMPTOMS
CHEST TIGHTNESS: 0
VOMITING: 0
EYE PAIN: 0
SHORTNESS OF BREATH: 0
COLOR CHANGE: 0
EYE REDNESS: 0
WHEEZING: 0

## 2021-08-14 ENCOUNTER — HOSPITAL ENCOUNTER (EMERGENCY)
Age: 51
Discharge: HOME | End: 2021-08-14
Attending: EMERGENCY MEDICINE
Payer: MEDICARE

## 2021-08-14 VITALS
OXYGEN SATURATION: 97 % | BODY MASS INDEX: 45.18 KG/M2 | HEIGHT: 63 IN | RESPIRATION RATE: 18 BRPM | DIASTOLIC BLOOD PRESSURE: 63 MMHG | HEART RATE: 62 BPM | SYSTOLIC BLOOD PRESSURE: 149 MMHG | TEMPERATURE: 97.6 F | WEIGHT: 255 LBS

## 2021-08-14 DIAGNOSIS — M54.2 NECK PAIN: ICD-10-CM

## 2021-08-14 DIAGNOSIS — M54.12 CERVICAL RADICULOPATHY: Primary | ICD-10-CM

## 2021-08-14 PROCEDURE — 96372 THER/PROPH/DIAG INJ SC/IM: CPT

## 2021-08-14 PROCEDURE — 99283 EMERGENCY DEPT VISIT LOW MDM: CPT

## 2021-08-14 PROCEDURE — 6360000002 HC RX W HCPCS: Performed by: STUDENT IN AN ORGANIZED HEALTH CARE EDUCATION/TRAINING PROGRAM

## 2021-08-14 PROCEDURE — 6370000000 HC RX 637 (ALT 250 FOR IP): Performed by: STUDENT IN AN ORGANIZED HEALTH CARE EDUCATION/TRAINING PROGRAM

## 2021-08-14 RX ORDER — KETOROLAC TROMETHAMINE 30 MG/ML
30 INJECTION, SOLUTION INTRAMUSCULAR; INTRAVENOUS ONCE
Status: COMPLETED | OUTPATIENT
Start: 2021-08-14 | End: 2021-08-14

## 2021-08-14 RX ORDER — ZOLPIDEM TARTRATE 10 MG/1
10 TABLET ORAL NIGHTLY
COMMUNITY

## 2021-08-14 RX ORDER — LIDOCAINE 4 G/G
2 PATCH TOPICAL ONCE
Status: DISCONTINUED | OUTPATIENT
Start: 2021-08-14 | End: 2021-08-14 | Stop reason: HOSPADM

## 2021-08-14 RX ORDER — PREDNISONE 20 MG/1
60 TABLET ORAL ONCE
Status: COMPLETED | OUTPATIENT
Start: 2021-08-14 | End: 2021-08-14

## 2021-08-14 RX ORDER — PREDNISONE 10 MG/1
TABLET ORAL
Qty: 33 TABLET | Refills: 0 | Status: SHIPPED | OUTPATIENT
Start: 2021-08-14 | End: 2021-08-28

## 2021-08-14 RX ADMIN — KETOROLAC TROMETHAMINE 30 MG: 30 INJECTION, SOLUTION INTRAMUSCULAR; INTRAVENOUS at 06:38

## 2021-08-14 RX ADMIN — PREDNISONE 60 MG: 20 TABLET ORAL at 06:37

## 2021-08-14 ASSESSMENT — PAIN SCALES - GENERAL: PAINLEVEL_OUTOF10: 5

## 2021-08-14 NOTE — ED PROVIDER NOTES
EMERGENCY DEPARTMENT ENCOUNTER      CHIEF COMPLAINT    Chief Complaint   Patient presents with    Neck Pain     R sided, ongoing for several weeks    Numbness     occ tingling in hands, ongoing since mid-july       HPI    Arun Ramirez is a 46 y.o. female with history significant for COPD osteoarthritis who presents with neck pain numbness. Patient has been having right-sided neck pain that is radiating down to the shoulder and the humeral area with associated tingling of bilateral hand has been starting since mid July patient is being managed as outpatient by PCP has not had any imagings or any steroids trialed yet, patient has been taking tramadol for this pain up until 5 days ago and has stopped given concern that a tingling may be a tramadol side effect however patient still having tingling and the pain slightly getting worse, but patient denies any symptoms of saddle anesthesia or any bowel urinary incontinence, any weakness, any persistent numbness just tingling sensation in bilateral hands and burning sensation in the thigh and bilateral feet as well. Patient also endorses or declines the following history/risks: - IVDU, - fever, - current/recent systemic infection, - immunosuppression, - recent spinal trauma or surgery, - urinary or bowel incontinence or retention, - Indwelling urinary catheter     REVIEW OF SYSTEMS    Constitutional: Denies chills, fatigue, unexpected weight loss or fever. HENT: Denies sore throat or rhinorrhea. Eyes: Denies vision changes. Respiratory: Denies shortness of breath or cough. Cardiovascular: Denies chest pain, leg swelling or palpitations. Gastrointestinal: Denies abdominal pain, diarrhea, nausea and vomiting. Genitourinary: Denies dysuria or hematuria. Skin: Denies rashes or wounds. MSK: cervical neck and lower back pain  Neurologic: Denies headache, lightheadedness, - numbness, - weakness.  +tingling  Hematologic/lymphatic: Denies unexpected weight loss, night sweats  Endocrine: No polyuria, polydipsia, or polyphagia      Pertinent positives and negatives are delineated in HPI and ROS above, all other systems are reviewed and are negative    PAST MEDICAL HISTORY    Past Medical History:   Diagnosis Date    Anxiety     COPD [facility]chronic obstructive pulmonary disease[facility] ([facility])     Panic attacks     Separation anxiety     TIA (transient ischemic attack)      Medical history reviewed and no pertinent past medical history other than the ones mentioned above    SURGICAL HISTORY    Past Surgical History:   Procedure Laterality Date     SECTION      CHOLECYSTECTOMY      HYSTERECTOMY      KNEE CARTILAGE SURGERY      R knee     TUBAL LIGATION       Surgical history reviewed and no pertinent surgical history other than the ones mentioned above    CURRENT MEDICATIONS    Current Outpatient Rx   Medication Sig Dispense Refill    zolpidem (AMBIEN) 10 MG tablet Take 10 mg by mouth nightly.  predniSONE (DELTASONE) 10 MG tablet Take 5 tablets by mouth daily for 2 days, THEN 4 tablets daily for 2 days, THEN 3 tablets daily for 2 days, THEN 2 tablets daily for 2 days, THEN 1 tablet daily for 3 days, THEN 0.5 tablets daily for 3 days.  33 tablet 0    rOPINIRole (REQUIP) 0.25 MG tablet TAKE 3 TABLETS BY MOUTH 2 HOURS BEFORE BED 90 tablet 1    SPIRIVA RESPIMAT 2.5 MCG/ACT AERS inhaler   2    Cholecalciferol (VITAMIN D3) 25 MCG (1000 UT) TABS   2    levothyroxine (SYNTHROID) 50 MCG tablet   2    meloxicam (MOBIC) 15 MG tablet   2    FLUoxetine (PROZAC) 20 MG capsule Take 20 mg by mouth daily      traMADol (ULTRAM) 50 MG tablet       cetirizine (ZYRTEC) 10 MG tablet   1    albuterol sulfate  (90 Base) MCG/ACT inhaler   0    gabapentin (NEURONTIN) 100 MG capsule  (Patient not taking: Reported on 2021)  2    ibuprofen (ADVIL;MOTRIN) 600 MG tablet Take 1 tablet by mouth every 6 hours as needed for Pain or Fever 60 tablet 0    acetaminophen (TYLENOL) 325 MG tablet Take 2 tablets by mouth every 6 hours as needed for Pain 60 tablet 0    furosemide (LASIX) 40 MG tablet Take 1 tablet by mouth 2 times daily 60 tablet 3    potassium chloride (KLOR-CON M) 20 MEQ extended release tablet Take 1 tablet by mouth daily 60 tablet 3    zolpidem (AMBIEN) 5 MG tablet Take 10 mg by mouth nightly as needed for Sleep.  LORazepam (ATIVAN) 0.5 MG tablet Take 0.5 mg by mouth every 6 hours as needed for Anxiety. Cathleen Shingles ibuprofen (ADVIL;MOTRIN) 600 MG tablet Take 1 tablet by mouth every 8 hours as needed for Pain 30 tablet 0    Compression Stockings MISC by Does not apply route 1 each 0    Spacer/Aero Chamber Mouthpiece MISC Use with inhaler as needed.  1 each 0     Medication is reviewed    ALLERGIES    No Known Allergies  Allergy is reviewed    FAMILY HISTORY    Family History   Problem Relation Age of Onset    Cancer Mother     Cancer Father      Family history reviewed and no pertinent family history other than the ones mentioned above    SOCIAL HISTORY    Social History     Socioeconomic History    Marital status:      Spouse name: Not on file    Number of children: Not on file    Years of education: Not on file    Highest education level: Not on file   Occupational History    Not on file   Tobacco Use    Smoking status: Current Every Day Smoker     Packs/day: 1.00     Years: 30.00     Pack years: 30.00     Types: Cigarettes    Smokeless tobacco: Never Used   Substance and Sexual Activity    Alcohol use: No     Comment: occasional    Drug use: No    Sexual activity: Not on file   Other Topics Concern    Not on file   Social History Narrative    Not on file     Social Determinants of Health     Financial Resource Strain:     Difficulty of Paying Living Expenses:    Food Insecurity:     Worried About Running Out of Food in the Last Year:     Fabiana of Food in the Last Year:    Transportation Needs:     Lack of Transportation (Medical):  Lack of Transportation (Non-Medical):    Physical Activity:     Days of Exercise per Week:     Minutes of Exercise per Session:    Stress:     Feeling of Stress :    Social Connections:     Frequency of Communication with Friends and Family:     Frequency of Social Gatherings with Friends and Family:     Attends Mor[employer] Services:     Active Member of Clubs or Organizations:     Attends Club or Organization Meetings:     Marital Status:    Intimate Partner Violence:     Fear of Current or Ex-Partner:     Emotionally Abused:     Physically Abused:     Sexually Abused:      Live with Self  Alcohol and recreational drug use: denies    PHYSICAL EXAM    Vital Signs:BP (!) 159/59   Pulse 62   Temp 97.6 °F (36.4 °C) (Oral)   Resp 18   Ht 5' 3\" (1.6 m)   Wt 255 lb (115.7 kg)   SpO2 99%   BMI 45.17 kg/m²   I have reviewed the triage vital signs. Constitutional: Well nourished, well developed, appears stated age  Eyes: PERRL, no conjunctival injection  HENT: NCAT, Neck supple without meningismus   CV: RRR, Warm, no edema  RESP: Normal RR, no increased respiratory efforts  GI: soft, non-distended  MSK: See below  Skin: Warm, dry. No rashes  Neuro: Alert, CNs II-XII grossly intact. Moving all 4 extremities  Psych: Appropriate mood and affect.     Detailed Back Exam    Deformity: absent  Midline tenderness to palpation of the spine: absent at C/T/L spine, no pain on axial loading of the cervical spine  Tenderness at paraspinal area: absent  CVA tenderness: absent  Overlying skin: non-erythematous, no fluctuance, no crepitus or discoloration  Neurological for thoracolumbar spine pain:    - Finger abduction strength normal (T1)    - Inner thigh sensation equal (L1-2)    - Knee extension normal (L3)    - Ankle dorsiflexion normal (L4)    - Great tone dorsiflexion normal (L5)    - Knee flexion and toes plantarflexion normal (S1-S2)        Neurological for cervical spine pain:    - Sensation of posterior scalp and neck normal (C1-4)    - Deltoid abduction strength equal (C5)    - Biceps flexion strength equal  (C6)    - Wrist extension strength equal (C7)    - Finger flexion (C8)     - Finger abduction (T1)    Flexion of the neck exacerbates the right-sided radiculopathic pain    Vascular: femoral pulse, radial pulse symmetric and equal      Labs:   Labs Reviewed - No data to display    Radiology:  No orders to display       I directly reviewed the images and radiology interpretation    ED COURSE  Assessment & Medical Decision Making:  June Macedo is a 46 y.o. female who presents with neck pain radiating down to the shoulder, given no trauma or concern for any fractures low, symptoms more likely due to radiculopathy, kidney she does have worsening pain with flexion's, may be due to cervical stenosis may be due to degenerative disc disease disc herniation patient does not have any weakness any grasp weakness or any difficulty with wrist extension or flexion's, not concerning for any significant spinal cord compromise at this point, patient also does not have symptoms of cauda equina syndrome given the lower back pain, I think the patient's pain is mainly nerve root related, will give patient Toradol lidocaine patches and prednisone, with symptom improvement, patient can be discharged with a prednisone taper as outpatient, patient will be referred to spine surgery for further care as outpatient. Imaging is not indicated here in the emergency department today          DDx includes but not limited to:  MSK back pain, radiculopathy, ZANDER, abscess, other infections, transverse myelitis, fracture, renal colic, pyelonephritis, AAA      Workup includes but not limited to: Detailed neurological exam    Treatment includes but not limited to: Pain control as above    Critical care time: NA    Impression:   1. Neck pain  2.  Lower back pain    Disposition: Discharge    This note dictated using Dragon medical voice recognition software. Attempts at proofreading were made, but errors may occasionally still occur.           Elina Ramos MD  08/14/21 [id number]

## 2021-08-14 NOTE — ED NOTES
Patient presents to ED via walk-in for intermittent R sided neck pain and bilateral hand numbness. Patient states hand tingling has been ongoing since mid-July and has had several appointments with PCP; believes it may be pinched nerve. Stopped taking tramadol 5 days ago after reading it may be medication side effect. Intermittent neck pain began several weeks ago, unsure of when. PCP also aware but did not do anything for it. States \"Pain just got so much worse tonight when I went to lay down so I decided I needed to come in.\" Denies taking medication for it, denies current pain.      Chris Frye RN  08/14/21 [id number]

## 2021-10-21 ENCOUNTER — OFFICE VISIT (OUTPATIENT)
Dept: ORTHOPEDIC SURGERY | Age: 51
End: 2021-10-21
Payer: MEDICARE

## 2021-10-21 VITALS
OXYGEN SATURATION: 96 % | RESPIRATION RATE: 15 BRPM | BODY MASS INDEX: 45.18 KG/M2 | HEART RATE: 79 BPM | HEIGHT: 63 IN | WEIGHT: 255 LBS

## 2021-10-21 DIAGNOSIS — M17.11 PRIMARY OSTEOARTHRITIS OF RIGHT KNEE: Primary | ICD-10-CM

## 2021-10-21 PROCEDURE — 20610 DRAIN/INJ JOINT/BURSA W/O US: CPT | Performed by: ORTHOPAEDIC SURGERY

## 2021-10-21 PROCEDURE — G8417 CALC BMI ABV UP PARAM F/U: HCPCS | Performed by: ORTHOPAEDIC SURGERY

## 2021-10-21 PROCEDURE — 4004F PT TOBACCO SCREEN RCVD TLK: CPT | Performed by: ORTHOPAEDIC SURGERY

## 2021-10-21 PROCEDURE — G8484 FLU IMMUNIZE NO ADMIN: HCPCS | Performed by: ORTHOPAEDIC SURGERY

## 2021-10-21 PROCEDURE — 3017F COLORECTAL CA SCREEN DOC REV: CPT | Performed by: ORTHOPAEDIC SURGERY

## 2021-10-21 PROCEDURE — G8427 DOCREV CUR MEDS BY ELIG CLIN: HCPCS | Performed by: ORTHOPAEDIC SURGERY

## 2021-10-21 PROCEDURE — 99213 OFFICE O/P EST LOW 20 MIN: CPT | Performed by: ORTHOPAEDIC SURGERY

## 2021-10-21 NOTE — PATIENT INSTRUCTIONS
Continue weight-bearing as tolerated. Continue range of motion exercises as instructed. Ice and elevate as needed. Tylenol or Motrin for pain.   Steroid injection given in the right knee today   Follow up in 3 months to discuss a total knee

## 2021-10-21 NOTE — PROGRESS NOTES
Patient returns to the office today for fu of the right knee injection given on 7/20/21. Pt states pain today is a 5/10 pt states the injection did help with her pain until a few weeks ago. Pt states that pain will increase with prolong walking. Pt states she would like another injection today in the right knee and then would like to discuss surgery at her next.

## 2021-10-21 NOTE — PROGRESS NOTES
10/21/2021   Chief Complaint   Patient presents with    Knee Pain     right knee injection given 07/20/21        History of Present Illness:                             Baldev Gerardo is a 46 y.o. female who returns today for follow-up of her right knee. She did well with the last injection but feels it has subsequently worn off. She is interested in having a repeat injection performed today. Her symptoms are unchanged. She continues to have deep aching pain along the medial joint line worse with prolonged standing or walking activities. Pain is constant throughout the day and also affects her ability to sleep at night. She has had difficulty remaining active and exercising because of the severity of the pain along the anterior medial aspect of her knee. Patient returns to the office today for fu of the right knee injection given on 7/20/21. Pt states pain today is a 5/10 pt states the injection did help with her pain until a few weeks ago. Pt states that pain will increase with prolong walking. Pt states she would like another injection today in the right knee and then would like to discuss surgery at her next. Medical History  Patient's medications, allergies, past medical, surgical, social and family histories were reviewed and updated as appropriate. Review of Systems   Constitutional: Negative for chills and fever. HENT: Negative for congestion and sneezing. Eyes: Negative for pain and redness. Respiratory: Negative for chest tightness, shortness of breath and wheezing. Cardiovascular: Negative for chest pain and palpitations. Gastrointestinal: Negative for vomiting. Musculoskeletal: Positive for arthralgias. Skin: Negative for color change and rash. Neurological: Negative for weakness and numbness. Psychiatric/Behavioral: Negative for agitation. The patient is not nervous/anxious.                                                 Examination:  General exam with the patient. I have recommended maximizing conservative treatments for the arthritic knee condition. We discussed the progression of her symptoms and the likelihood that she will eventually benefit from a total knee replacement.     We discussed the use of steroid injections as needed for severe symptoms. Currently patient is having significant pain on a daily basis and this is impacting activities of daily living and ability to get comfortable at rest.  The patient would like to have an injection performed today. Procedure Note, Right Knee Intraarticular Injection:  The right knee was prepped with alcohol and injected intra-articularly with 80 mg of Kenalog and 4 mL of 1% lidocaine through a 22-gauge needle. Sterile Band-Aid was applied. The patient tolerated it well without complications.     I have recommended weight loss and maintaining a healthy weight to decrease the forces across the degenerative knee joint. I recommended that she lose weight prior to considering moving forward with elective total knee replacement. We discussed the importance of maintaining flexibility and strength at the knee. I have advised the patient to have a home exercise program that includes stretching and low impact activities such as walking, biking, or elliptical machines.     We discussed the possibility of physical therapy. The patient would like to defer formal physical therapy at this time. They will call if they would like to have a referral sent.     I instructed the patient on the use of over-the-counter anti-inflammatory medications.     Follow-up in  3 months for check of the response to the injection and home exercise program.        Electronically signed by Kirsten Hernandez MD on 10/21/2021 at 2:58 PM

## 2022-01-13 ENCOUNTER — OFFICE VISIT (OUTPATIENT)
Dept: ORTHOPEDIC SURGERY | Age: 52
End: 2022-01-13
Payer: MEDICARE

## 2022-01-13 VITALS
WEIGHT: 261 LBS | RESPIRATION RATE: 15 BRPM | HEART RATE: 76 BPM | OXYGEN SATURATION: 96 % | BODY MASS INDEX: 46.25 KG/M2 | HEIGHT: 63 IN

## 2022-01-13 DIAGNOSIS — M17.11 PRIMARY OSTEOARTHRITIS OF RIGHT KNEE: Primary | ICD-10-CM

## 2022-01-13 PROCEDURE — G8427 DOCREV CUR MEDS BY ELIG CLIN: HCPCS | Performed by: ORTHOPAEDIC SURGERY

## 2022-01-13 PROCEDURE — G8484 FLU IMMUNIZE NO ADMIN: HCPCS | Performed by: ORTHOPAEDIC SURGERY

## 2022-01-13 PROCEDURE — 99214 OFFICE O/P EST MOD 30 MIN: CPT | Performed by: ORTHOPAEDIC SURGERY

## 2022-01-13 PROCEDURE — 4004F PT TOBACCO SCREEN RCVD TLK: CPT | Performed by: ORTHOPAEDIC SURGERY

## 2022-01-13 PROCEDURE — 3017F COLORECTAL CA SCREEN DOC REV: CPT | Performed by: ORTHOPAEDIC SURGERY

## 2022-01-13 PROCEDURE — G8417 CALC BMI ABV UP PARAM F/U: HCPCS | Performed by: ORTHOPAEDIC SURGERY

## 2022-01-13 RX ORDER — ISOPROPYL ALCOHOL 0.7 ML/1
SWAB TOPICAL
COMMUNITY
Start: 2021-12-28

## 2022-01-13 RX ORDER — BLOOD-GLUCOSE METER
EACH MISCELLANEOUS
COMMUNITY
Start: 2021-10-27

## 2022-01-13 RX ORDER — CYCLOBENZAPRINE HCL 10 MG
TABLET ORAL
COMMUNITY
Start: 2022-01-12

## 2022-01-13 RX ORDER — GABAPENTIN 300 MG/1
300 CAPSULE ORAL NIGHTLY
COMMUNITY
Start: 2022-01-12

## 2022-01-13 RX ORDER — LANCETS 33 GAUGE
EACH MISCELLANEOUS
COMMUNITY
Start: 2021-12-28

## 2022-01-13 RX ORDER — BLOOD SUGAR DIAGNOSTIC
STRIP MISCELLANEOUS
COMMUNITY
Start: 2021-12-28

## 2022-01-13 RX ORDER — FLUOXETINE HYDROCHLORIDE 40 MG/1
CAPSULE ORAL NIGHTLY
COMMUNITY
Start: 2021-12-28

## 2022-01-13 NOTE — PATIENT INSTRUCTIONS
Continue weight-bearing as tolerated. Continue range of motion exercises as instructed. Ice and elevate as needed. Tylenol or Motrin for pain. We will schedule surgery at soonest convenience.  If you have any questions regarding your surgery please call our office and ask to speak with Alison Mendes [phone]

## 2022-01-13 NOTE — PROGRESS NOTES
Patient presents to the office today for FU of the right knee. Pt states last injection given on 10/21/21 did not help with the pain. Pt states pain today is a 6/10. Pt states pain is along the medial aspect of the right knee. Pt states she does take ibuprofen daily and does do stretching daily.

## 2022-01-14 ASSESSMENT — ENCOUNTER SYMPTOMS
SHORTNESS OF BREATH: 0
WHEEZING: 0
EYE PAIN: 0
COLOR CHANGE: 0
CHEST TIGHTNESS: 0
VOMITING: 0
EYE REDNESS: 0

## 2022-01-14 NOTE — PROGRESS NOTES
Socioeconomic History    Marital status:      Spouse name: None    Number of children: None    Years of education: None    Highest education level: None   Occupational History    None   Tobacco Use    Smoking status: Current Every Day Smoker     Packs/day: 1.00     Years: 30.00     Pack years: 30.00     Types: Cigarettes    Smokeless tobacco: Never Used   Substance and Sexual Activity    Alcohol use: No     Comment: occasional    Drug use: No    Sexual activity: None   Other Topics Concern    None   Social History Narrative    None     Social Determinants of Health     Financial Resource Strain:     Difficulty of Paying Living Expenses: Not on file   Food Insecurity:     Worried About Running Out of Food in the Last Year: Not on file    Fabiana of Food in the Last Year: Not on file   Transportation Needs:     Lack of Transportation (Medical): Not on file    Lack of Transportation (Non-Medical):  Not on file   Physical Activity:     Days of Exercise per Week: Not on file    Minutes of Exercise per Session: Not on file   Stress:     Feeling of Stress : Not on file   Social Connections:     Frequency of Communication with Friends and Family: Not on file    Frequency of Social Gatherings with Friends and Family: Not on file    Attends Alevism Services: Not on file    Active Member of [address] [place] [address] [employer] or Organizations: Not on file    Attends Club or Organization Meetings: Not on file    Marital Status: Not on file   Intimate Partner Violence:     Fear of Current or Ex-Partner: Not on file    Emotionally Abused: Not on file    Physically Abused: Not on file    Sexually Abused: Not on file   Housing Stability:     Unable to Pay for Housing in the Last Year: Not on file    Number of [place] in the Last Year: Not on file    Unstable Housing in the Last Year: Not on file     Current Outpatient Medications   Medication Sig Dispense Refill    gabapentin (NEURONTIN) 300 MG capsule       cyclobenzaprine (FLEXERIL) 10 MG tablet       FLUoxetine (PROZAC) 40 MG capsule take 1 capsule by oral route every day in the morning      ONETOUCH VERIO strip test 2 - 3 times a day by Transdermal route as directed      OneTouch Delica Lancets 15T MISC test UP TO THREE TIMES DAILY      Blood Glucose Monitoring Suppl (ONETOUCH VERIO FLEX SYSTEM) w/Device KIT USE AS DIRECTED      Alcohol Swabs ( STERILE ALCOHOL PREP) PADS take by Topical route every day      zolpidem (AMBIEN) 10 MG tablet Take 10 mg by mouth nightly.  rOPINIRole (REQUIP) 0.25 MG tablet TAKE 3 TABLETS BY MOUTH 2 HOURS BEFORE BED 90 tablet 1    traMADol (ULTRAM) 50 MG tablet       cetirizine (ZYRTEC) 10 MG tablet   1    SPIRIVA RESPIMAT 2.5 MCG/ACT AERS inhaler   2    albuterol sulfate  (90 Base) MCG/ACT inhaler   0    Cholecalciferol (VITAMIN D3) 25 MCG (1000 UT) TABS   2    gabapentin (NEURONTIN) 100 MG capsule  (Patient not taking: Reported on 6/18/2021)  2    levothyroxine (SYNTHROID) 50 MCG tablet   2    meloxicam (MOBIC) 15 MG tablet   2    ibuprofen (ADVIL;MOTRIN) 600 MG tablet Take 1 tablet by mouth every 6 hours as needed for Pain or Fever 60 tablet 0    acetaminophen (TYLENOL) 325 MG tablet Take 2 tablets by mouth every 6 hours as needed for Pain 60 tablet 0    furosemide (LASIX) 40 MG tablet Take 1 tablet by mouth 2 times daily 60 tablet 3    potassium chloride (KLOR-CON M) 20 MEQ extended release tablet Take 1 tablet by mouth daily 60 tablet 3    FLUoxetine (PROZAC) 20 MG capsule Take 20 mg by mouth daily      zolpidem (AMBIEN) 5 MG tablet Take 10 mg by mouth nightly as needed for Sleep.  LORazepam (ATIVAN) 0.5 MG tablet Take 0.5 mg by mouth every 6 hours as needed for Anxiety. Tiffany Tena ibuprofen (ADVIL;MOTRIN) 600 MG tablet Take 1 tablet by mouth every 8 hours as needed for Pain 30 tablet 0    Compression Stockings MISC by Does not apply route 1 each 0    Spacer/Aero Chamber Mouthpiece MISC Use with inhaler as needed. 1 each 0     No current facility-administered medications for this visit. No Known Allergies      Review of Systems   Constitutional: Negative for chills and fever. HENT: Negative for congestion and sneezing. Eyes: Negative for pain and redness. Respiratory: Negative for chest tightness, shortness of breath and wheezing. Cardiovascular: Negative for chest pain and palpitations. Gastrointestinal: Negative for vomiting. Musculoskeletal: Positive for arthralgias. Skin: Negative for color change and rash. Neurological: Negative for weakness and numbness. Psychiatric/Behavioral: Negative for agitation. The patient is not nervous/anxious. Examination:  General Exam:  Vitals: Pulse 76   Resp 15   Ht 5' 3\" (1.6 m)   Wt 261 lb (118.4 kg)   SpO2 96%   BMI 46.23 kg/m²    Physical Exam  Vitals and nursing note reviewed. Constitutional:       Appearance: Normal appearance. HENT:      Head: Normocephalic and atraumatic. Eyes:      Conjunctiva/sclera: Conjunctivae normal.      Pupils: Pupils are equal, round, and reactive to light. Pulmonary:      Effort: Pulmonary effort is normal.   Musculoskeletal:      Cervical back: Normal range of motion. Right hip: No deformity, tenderness, bony tenderness or crepitus. Normal range of motion. Normal strength. Left hip: Normal.      Left knee: No swelling, deformity, effusion, ecchymosis or lacerations. Normal range of motion. No tenderness. No medial joint line or lateral joint line tenderness. No LCL laxity or MCL laxity. Normal alignment and normal meniscus. Comments: Right Lower Extremity:    There is moderate to severe tenderness to palpation diffusely throughout the knee, most significant along the medial joint line. There is a moderate knee joint effusion present and mild global swelling anteriorly.   Mild restricted range of motion at the knee with approximately 5 degrees lack of full extension and knee flexion up to 120 degrees with pain at the extremes of motion. There is mild crepitation during active range of motion. There is mild varus knee alignment. There is 5 out of 5 strength with knee flexion and extension. There is no instability to varus or valgus stress testing and anterior and posterior drawer testing. Sensation is intact to light touch throughout the lower extremity. There is positive medial Elidia's test with tenderness to palpation and pain along the medial joint line. Skin is intact. Pulses are intact    No pain with active range of motion of the hip. Strength and range of motion of the hip are intact. No tenderness to palpation at the hip. Skin:     General: Skin is warm and dry. Neurological:      Mental Status: She is alert and oriented to person, place, and time. Psychiatric:         Mood and Affect: Mood normal.         Behavior: Behavior normal.            Diagnostic testing:  X-ray images were reviewed by myself and discussed with the patient:  X-ray images of the right knee from 6/18/2021 were reviewed by myself and discussed with patient:  There is tricompartmental advanced degenerative changes most prominent medial compartment where there are prominent osteophytes and high-grade joint space narrowing. There is subchondral sclerosis and irregularities on both sides of the joint primarily on the medial compartment but also involving the patellofemoral compartment. No evidence of fracture. Moderate varus alignment present. Mild joint effusion present    Office Procedures:  No orders of the defined types were placed in this encounter. Assessment and Plan  1. Right knee tricompartmental primary osteoarthritis    We discussed the severity of the degenerative findings on both on the x-rays and physical exam.  The patient feels that they have exhausted conservative measures.   The patient has previously tried injections, physical therapy, over-the-counter pain medications, and activity modification. The pain level is severe and bothers the patient on a daily basis, including interrupting sleep at night. Activities of daily living are difficult to perform. The patient has trouble getting dressed, getting up from a seated position, climbing stairs, and has fear of falling. The pain and dysfunction at the knee are severely limiting at this stage and the patient would like to consider surgical intervention. I have recommended surgical intervention for a total knee replacement. We discussed the risks, benefits, and alternatives to surgery. We discussed the intended benefits from a well-functioning replacement and the anticipated recovery process. We discussed potential risks and complications including but not limited to DVT/PE, infection, stiffness, loosening, and fracture. We discussed pain control, physical therapy, and anticoagulation during the perioperative timeframe. The patient would like to proceed with knee replacement surgery and will be enrolled in the joint replacement class    Plan will be to proceed with a robotic assisted right total knee replacement.   She will be on aspirin postoperatively for DVT prophylaxis    Electronically signed by Davin Valentine MD on 1/14/2022 at 8:57 AM

## 2022-01-27 ENCOUNTER — TELEPHONE (OUTPATIENT)
Dept: CARDIOLOGY CLINIC | Age: 52
End: 2022-01-27

## 2022-01-27 DIAGNOSIS — M17.11 PRIMARY OSTEOARTHRITIS OF RIGHT KNEE: Primary | ICD-10-CM

## 2022-01-27 DIAGNOSIS — M25.561 CHRONIC PAIN OF RIGHT KNEE: ICD-10-CM

## 2022-01-27 DIAGNOSIS — Z20.822 ENCOUNTER FOR PREOPERATIVE SCREENING LABORATORY TESTING FOR COVID-19 VIRUS: ICD-10-CM

## 2022-01-27 DIAGNOSIS — G89.29 CHRONIC PAIN OF RIGHT KNEE: ICD-10-CM

## 2022-01-27 DIAGNOSIS — Z01.812 ENCOUNTER FOR PREOPERATIVE SCREENING LABORATORY TESTING FOR COVID-19 VIRUS: ICD-10-CM

## 2022-01-27 NOTE — TELEPHONE ENCOUNTER
Called pt to schedule new pt ov for surgery clearance. Last seen 2018 by Charley Brian.  Can see someone else if pt wants to

## 2022-02-02 ENCOUNTER — HOSPITAL ENCOUNTER (OUTPATIENT)
Dept: PHYSICAL THERAPY | Age: 52
Setting detail: THERAPIES SERIES
Discharge: HOME | End: 2022-02-02
Payer: MEDICARE

## 2022-02-02 PROCEDURE — 97530 THERAPEUTIC ACTIVITIES: CPT

## 2022-02-02 PROCEDURE — 97110 THERAPEUTIC EXERCISES: CPT

## 2022-02-02 PROCEDURE — 97161 PT EVAL LOW COMPLEX 20 MIN: CPT

## 2022-02-02 ASSESSMENT — PAIN DESCRIPTION - ONSET: ONSET: ON-GOING

## 2022-02-02 ASSESSMENT — PAIN DESCRIPTION - LOCATION: LOCATION: KNEE

## 2022-02-02 ASSESSMENT — PAIN - FUNCTIONAL ASSESSMENT: PAIN_FUNCTIONAL_ASSESSMENT: PREVENTS OR INTERFERES SOME ACTIVE ACTIVITIES AND ADLS

## 2022-02-02 ASSESSMENT — PAIN DESCRIPTION - PAIN TYPE: TYPE: CHRONIC PAIN

## 2022-02-02 ASSESSMENT — PAIN DESCRIPTION - FREQUENCY: FREQUENCY: INTERMITTENT

## 2022-02-02 ASSESSMENT — PAIN SCALES - GENERAL: PAINLEVEL_OUTOF10: 4

## 2022-02-02 ASSESSMENT — PAIN DESCRIPTION - DESCRIPTORS: DESCRIPTORS: SHARP

## 2022-02-02 ASSESSMENT — PAIN DESCRIPTION - ORIENTATION: ORIENTATION: RIGHT;INNER

## 2022-02-02 ASSESSMENT — PAIN DESCRIPTION - PROGRESSION: CLINICAL_PROGRESSION: GRADUALLY WORSENING

## 2022-02-02 NOTE — PROGRESS NOTES
Physical Therapy  Initial Assessment  Date: 2022  Patient Name: Carter Lamb  MRN: [id number]  : 1970     Treatment Diagnosis: R knee pain, stiffness, weakness, gait dysfunction    Restrictions       Subjective   General  Chart Reviewed: Yes  Patient assessed for rehabilitation services?: Yes  Referring Practitioner: Dr. Kannan Mendoza  Diagnosis: R Knee OA  PT Visit Information  PT Insurance Information: BCBS Medicare  Subjective  Subjective: 15 years of progressive R knee OA. 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 Scope on the R knee years ago with temporary improvement. Attempted injections with improvement the first time for 3 months with second lasting couple of weeks. Known severe OA with decisoin of TKA. DOS: 22. Plan to go home next day. Lives with son with plan to come here for outpatient therapy. No issues with L knee. No AD owns. Pain Screening  Patient Currently in Pain: Yes  Pain Assessment  Pain Assessment: 0-10  Pain Level: 4 (Worst: 10/10 after prolonged sitting. Best: 0/10 non-weightbearing with meds)  Patient's Stated Pain Goal: No pain  Pain Type: Chronic pain  Pain Location: Knee  Pain Orientation: Right; Inner  Pain Descriptors: Sharp  Pain Frequency: Intermittent  Pain Onset: On-going  Clinical Progression: Gradually worsening  Functional Pain Assessment: Prevents or interferes some active activities and ADLs  Vital Signs  Patient Currently in Pain: Yes    Vision/Hearing  Vision  Vision: Within Functional Limits  Hearing  Hearing: Within functional limits    Orientation  Orientation  Overall Orientation Status: Within Normal Limits    Social/Functional History  Social/Functional History  Lives With: Son  Type of Home: House  Home Layout: One level  Home Access: Stairs to enter with rails  Entrance Stairs - Number of Steps: 4  Entrance Stairs - Rails: Left  Bathroom Shower/Tub: Tub/Shower unit  Bathroom Toilet: Standard  Bathroom Equipment: Shower chair  ADL Assistance: Independent  Homemaking Assistance: Independent  Ambulation Assistance: Independent  Transfer Assistance: Independent  Active : Yes  Mode of Transportation: Car  Occupation: On disability  Leisure & Hobbies: None    Objective     Observation/Palpation  Palpation: Min medial joint line tenderness and into tendon. Observation: able to stand without UE assistance. Limited to 1/4 squat secondary to pain. AROM RLE (degrees)  RLE General AROM: 0-80 deg knee ext/flex limited secondary to pain. PROM LLE (degrees)  LLE PROM: WFL  AROM LLE (degrees)  LLE AROM : WFL  LLE General AROM: 0-118 deg knee ext/flex  Joint Mobility  ROM RLE: poor patellar mobility with discomfort with assessment. ROM LLE: WNL    Strength RLE  Strength RLE: Exception  Comment: weakness into hip with knee pain with flex/ext. R Hip Flexion: 4/5  R Hip Extension: 4/5  R Hip ABduction: 4/5  R Hip ADduction: 4/5  R Hip Internal Rotation: 4+/5  R Hip External Rotation: 4+/5  R Knee Flexion: 4/5  R Knee Extension: 4-/5  Strength LLE  Strength LLE: WNL  Comment: 5/5 in all directions. Strength Other  Other: 5x sit to stand: 17.91 without UE assistance  4-5/10 pain post tx. TUG:     Additional Measures  Other: LEFS: 24/80 full function     Balance  Single Leg Stance R Leg: 3  Single Leg Stance L Leg: 3  Comments: poor stability bilaterally with min increase in pain with R SLS. Assessment   Conditions Requiring Skilled Therapeutic Intervention  Body structures, Functions, Activity limitations: Decreased functional mobility ; Decreased ROM; Decreased endurance;Decreased balance; Increased pain;Decreased strength  Pt is 46year old female with expected R TKA on 2/21/22. Pt now has difficulties completing closed chain activities including stairs and sit to stand and prolonged weightbearing activities with increasing pain. Pt demo deficits this date that include pain, flexibility restrictions and weakness.   Pt educated on proper gait with AD and stair negotiation. Issued handout for exercises prior and post surgery until return to outpatient PT services. Pt will benefit with PT services with strength/ROM, gait training, manual and modalities post surgery to maximize function. Pt prior to onset of current condition had min/no pain with able to complete full ADLs and work activities. Patient received education on their current pathology and how their condition effects them with their functional activities. Patient understood discussion and questions were answered. Patient understands their activity limitations and understands rational for treatment progression. Treatment Diagnosis: R knee pain, stiffness, weakness, gait dysfunction  Prognosis: Good  Decision Making: Low Complexity  Barriers to Learning: None  REQUIRES PT FOLLOW UP: Yes  Activity Tolerance  Activity Tolerance: Patient Tolerated treatment well         Plan   Plan  Times per week: 1  Plan weeks: 2  Specific instructions for Next Treatment: re-eval with follow up after surgery with progression in ROM/strength and gait as indicated. Current Treatment Recommendations: Strengthening,ROM,Home Exercise Program,Neuromuscular Re-education,Manual Therapy - Soft Tissue Mobilization,Modalities,Gait Training      Goals  Short term goals  Time Frame for Short term goals: Defer to Long Term Goals  Patient Goals   Patient goals : improve pain and mobility. Short term goals (All Goals MET)   Time Frame for Long term goals : In this visit, patient will  Long term goal 1: demonstrate good understanding of exercise progression post surgery. Long term goal 2: demonstrate good understanding of walker use post surgery    Long term goal 3: demonstrate good understanding of ascending/descending stairs after surgery. Long term goal 4: watch [id number] Video      Time Frame for Long term goals: Re-assess post surgery for appropriate goals.       Blanquita Schultz, PT, DPT, OCS    2/2/2022 5:49 PM

## 2022-02-02 NOTE — FLOWSHEET NOTE
Outpatient Physical Therapy  [place]           [x] Phone: [phone]   Fax: [phone]  Romina germain           [] Phone: [phone]   Fax: [phone]        Physical Therapy Daily Treatment Note  Date:  2022    Patient Name:  Kassy Johnson    :  1970  MRN: [id number]  Restrictions/Precautions:    Diagnosis:   Diagnosis: R Knee OA  Date of Injury/Surgery: 22  Treatment Diagnosis: Treatment Diagnosis: R knee pain, stiffness, weakness, gait dysfunction    Insurance/Certification information: PT Insurance Information: [employer] Medicare   Referring Physician:  Referring Practitioner: Dr. Larissa Silverman  Next Doctor Visit:    Plan of care signed (Y/N):  N, sent 22  Outcome Measure: LEFS:    Visit# / total visits:    per POC  Pain level: 4/10   Goals:     Patient goals : improve pain and mobility. Short term goals  Time Frame for Short term goals: Defer to Long Term Goals    Short term goals (All Goals MET)   Time Frame for Long term goals : In this visit, patient will  Long term goal 1: demonstrate good understanding of exercise progression post surgery. Long term goal 2: demonstrate good understanding of walker use post surgery    Long term goal 3: demonstrate good understanding of ascending/descending stairs after surgery. Long term goal 4: watch [id number] Video      Time Frame for Long term goals: Re-assess post surgery for appropriate goals. Summary of Evaluation:   Pt is 46year old female with expected R TKA on 22. Pt now has difficulties completing closed chain activities including stairs and sit to stand and prolonged weightbearing activities with increasing pain. Pt demo deficits this date that include pain, flexibility restrictions and weakness. Pt educated on proper gait with AD and stair negotiation. Issued handout for exercises prior and post surgery until return to outpatient PT services.  Pt will benefit with PT services with strength/ROM, gait training, manual and modalities post surgery to maximize function. Pt prior to onset of current condition had min/no pain with able to complete full ADLs and work activities. Patient received education on their current pathology and how their condition effects them with their functional activities. Patient understood discussion and questions were answered. Patient understands their activity limitations and understands rational for treatment progression. Subjective:  See reji         Any changes in Ambulatory Summary Sheet? None        Objective:  See eval   COVID screening questions were asked and patient attested that there had been no contact or symptoms        Exercises: (No more than 4 columns)   Exercise/Equipment 2/2/22  #1 Date Date           WARM UP         Nu step             TABLE      *Quad set       *Heel prop      *Ankle pumps       *PROM knee flexion stretch in sitting                STANDING      *Marches with walker                                                PROPRIOCEPTION                                    MODALITIES      vaso                Other Therapeutic Activities/Education:  Patient received education on their current pathology and how their condition effects them with their functional activities. Patient understood discussion and questions were answered. Patient understands their activity limitations and understands rational for treatment progression. Educated on proper gait mechanics with AD, stair negotiation,  expectations post surgery and POC post surger to maximize function. Pt reported understanding. Home Exercise Program:  HO issued, reviewed and discussed with patient. Pt agreed to comply. Manual Treatments:  --      Modalities:  --      Communication with other providers:  POC sent 2/2/22       Assessment:  (Response towards treatment session) (Pain Rating)     Pt is 46year old female with expected R TKA on 2/21/22.  Pt now has difficulties completing closed chain activities including stairs and sit to stand and prolonged weightbearing activities with increasing pain. Pt demo deficits this date that include pain, flexibility restrictions and weakness. Pt educated on proper gait with AD and stair negotiation. Issued handout for exercises prior and post surgery until return to outpatient PT services. Pt will benefit with PT services with strength/ROM, gait training, manual and modalities post surgery to maximize function. Pt prior to onset of current condition had min/no pain with able to complete full ADLs and work activities. Patient received education on their current pathology and how their condition effects them with their functional activities. Patient understood discussion and questions were answered. Patient understands their activity limitations and understands rational for treatment progression.       Plan for Next Session:  re-eval with follow up after surgery with progression in ROM/strength and gait as indicated      Time In / Time Out:     [id number]         If [facility] Please Indicate Time In/Out/Total Time  CPT Code Time in Time out Total Time                                                            Total for session             Timed Code/Total Treatment Minutes:  25/55'      10' TA, 15' TE, 1 PT eval         Next Progress Note due:  Eval 2/2/22   Visit 10       Plan of Care Interventions:  [x] Therapeutic Exercise  [x] Modalities:  [x] Therapeutic Activity     [] Ultrasound  [x] Estim  [x] Gait Training      [] Cervical Traction [] Lumbar Traction  [x] Neuromuscular Re-education    [x] Cold/hotpack [] Iontophoresis   [x] Instruction in HEP      [x] Vasopneumatic   [] Dry Needling    [x] Manual Therapy               [] Aquatic Therapy              Electronically signed by:  Tran Salter PT, DPT, OCS  2/2/2022, 7:23 AM    2/2/2022 7:23 AM

## 2022-02-02 NOTE — PLAN OF CARE
Outpatient Physical Therapy           [place]           [] Phone: [phone]   Fax: [phone]  [place]           [] Phone: [phone]   Fax: [phone]     To: Referring Practitioner: Dr. Fortunato Saldana    From: Clay Jin, PT, DPT, OCS      Patient: Danielle Flor       : 1970  Diagnosis: Diagnosis: R Knee OA TKA expected 22  Treatment Diagnosis: Treatment Diagnosis: R knee pain, stiffness, weakness, gait dysfunction   Date: 2022    Physical Therapy Certification/Re-Certification Form  Dear Dr. Fortunato Saldana   The following patient has been evaluated for physical therapy services and for therapy to continue, insurance requires physician review of the treatment plan initially and every 90 days. Please review the attached evaluation and/or summary of the patient's plan of care, and verify that you agree therapy should continue by signing the attached document and sending it back to our office. Assessment:      Pt is 46year old female with expected R TKA on 22. Pt now has difficulties completing closed chain activities including stairs and sit to stand and prolonged weightbearing activities with increasing pain. Pt demo deficits this date that include pain, flexibility restrictions and weakness. Pt educated on proper gait with AD and stair negotiation. Issued handout for exercises prior and post surgery until return to outpatient PT services. Pt will benefit with PT services with strength/ROM, gait training, manual and modalities post surgery to maximize function. Pt prior to onset of current condition had min/no pain with able to complete full ADLs and work activities. Patient received education on their current pathology and how their condition effects them with their functional activities. Patient understood discussion and questions were answered. Patient understands their activity limitations and understands rational for treatment progression.     Plan of Care/Treatment to date:  [x] Therapeutic Exercise  [x] Modalities:  [x] Therapeutic Activity     [] Ultrasound  [x] Electrical Stimulation  [x] Gait Training      [] Cervical Traction [] Lumbar Traction  [x] Neuromuscular Re-education    [x] Cold/hotpack [] Iontophoresis   [x] Instruction in HEP      [x] Vasopneumatic    [] Dry Needling  [x] Manual Therapy               [] Aquatic Therapy       Other:          Frequency/Duration:  # Days per week: [x] 1 day # Weeks: [] 1 week [] 5 weeks     [] 2 days   [x] 2 weeks [] 6 weeks     [] 3 days   [] 3 weeks [] 7 weeks     [] 4 days   [] 4 weeks [] 8 weeks         [] 9 weeks [] 10 weeks         [] 11 weeks [] 12 weeks    Rehab Potential/Progress: [] Excellent [x] Good [] Fair  [] Poor     Goals:    Patient goals : improve pain and mobility. Short term goals  Time Frame for Short term goals: Defer to Long Term Goals    Short term goals (All Goals MET)   Time Frame for Long term goals : In this visit, patient will  Long term goal 1: demonstrate good understanding of exercise progression post surgery. Long term goal 2: demonstrate good understanding of walker use post surgery    Long term goal 3: demonstrate good understanding of ascending/descending stairs after surgery. Long term goal 4: watch [id number] Video      Time Frame for Long term goals: Re-assess post surgery for appropriate goals. Electronically signed by:  Sowmya Vasquez, PT, DPT, OCS  2/2/2022, 5:49 PM    2/2/2022 5:49 PM         If you have any questions or concerns, please don't hesitate to call.   Thank you for your referral.      Physician Signature:________________________________Date:_________ TIME: _____  By signing above, therapists plan is approved by physician

## 2022-02-07 ENCOUNTER — HOSPITAL ENCOUNTER (OUTPATIENT)
Dept: CT IMAGING | Age: 52
Discharge: HOME | End: 2022-02-07
Payer: MEDICARE

## 2022-02-07 DIAGNOSIS — G89.29 CHRONIC PAIN OF RIGHT KNEE: ICD-10-CM

## 2022-02-07 DIAGNOSIS — M25.561 CHRONIC PAIN OF RIGHT KNEE: ICD-10-CM

## 2022-02-07 DIAGNOSIS — M17.11 PRIMARY OSTEOARTHRITIS OF RIGHT KNEE: ICD-10-CM

## 2022-02-07 PROCEDURE — 73700 CT LOWER EXTREMITY W/O DYE: CPT

## 2022-02-08 ENCOUNTER — PROCEDURE VISIT (OUTPATIENT)
Dept: CARDIOLOGY CLINIC | Age: 52
End: 2022-02-08
Payer: MEDICARE

## 2022-02-08 ENCOUNTER — OFFICE VISIT (OUTPATIENT)
Dept: CARDIOLOGY CLINIC | Age: 52
End: 2022-02-08
Payer: MEDICARE

## 2022-02-08 VITALS
HEART RATE: 77 BPM | BODY MASS INDEX: 46.39 KG/M2 | HEIGHT: 63 IN | WEIGHT: 261.8 LBS | SYSTOLIC BLOOD PRESSURE: 130 MMHG | DIASTOLIC BLOOD PRESSURE: 82 MMHG

## 2022-02-08 DIAGNOSIS — Z01.818 PRE-OP EVALUATION: ICD-10-CM

## 2022-02-08 DIAGNOSIS — G47.33 OSA (OBSTRUCTIVE SLEEP APNEA): ICD-10-CM

## 2022-02-08 DIAGNOSIS — Z72.0 TOBACCO ABUSE: Chronic | ICD-10-CM

## 2022-02-08 DIAGNOSIS — R06.02 SOB (SHORTNESS OF BREATH) ON EXERTION: ICD-10-CM

## 2022-02-08 DIAGNOSIS — R06.02 SOB (SHORTNESS OF BREATH): ICD-10-CM

## 2022-02-08 DIAGNOSIS — R06.02 SOB (SHORTNESS OF BREATH): Primary | ICD-10-CM

## 2022-02-08 LAB
LV EF: 58 %
LVEF MODALITY: NORMAL

## 2022-02-08 PROCEDURE — 93306 TTE W/DOPPLER COMPLETE: CPT | Performed by: INTERNAL MEDICINE

## 2022-02-08 PROCEDURE — 93000 ELECTROCARDIOGRAM COMPLETE: CPT | Performed by: INTERNAL MEDICINE

## 2022-02-08 PROCEDURE — 99204 OFFICE O/P NEW MOD 45 MIN: CPT | Performed by: INTERNAL MEDICINE

## 2022-02-08 NOTE — PATIENT INSTRUCTIONS
**It is YOUR responsibilty to bring medication bottles and/or updated medication list to [address] [place] [address]. This will allow us to better serve you and all your healthcare needs**  Please be informed that if you contact our office outside of normal business hours the physician on call cannot help with any scheduling or rescheduling issues, procedure instruction questions or any type of medication issue. We advise you for any urgent/emergency that you go to the nearest emergency room! PLEASE CALL OUR OFFICE DURING NORMAL BUSINESS HOURS    Monday - Friday   8 am to 5 pm    [facility] [address]: [phone]    [place]:  [phone]    Please hold on to these instructions the  will call you within 1-9 business days when we receive authorization from your insurance. Echocardiogram    WHAT TO EXPECT:   ? This test will take approximately 45 minutes. ? It is an ultrasound of the heart. ? It can look at the valves and chambers inside the heart   ? There is no special instructions for this test.     If you are unable to keep this appointment, please notify us 24 hours prior to test at [phone]. Please hold on to these instructions the  will call you within 1-9 business days when we receive authorization from your insurance. Nuclear Stress Test    WHAT TO EXPECT:   ? You will need to confirm the test or it could be cancelled. ? This test will take approximately 2 hours: 1 hour in the AM &    1 hour in the PM. You will be given a time by the Technologist after the first part is completed to come back. ? You will be given a medication, through an IV in the hand, this will safely simulate exercise. This IV is also needed to inject the radioactive isotope unless you are able toe walk the treadmill. ? You will receive an injection in the AM & PM before the pictures. ?  Using a special camera, you will have one set of pictures of your heart taken in the AM and a set of pictures in the PM.     PREPARATION FOR TEST:  ? Eat a light breakfast such as water or juice and toast.  ? If you are DIABETIC: Eat a normal breakfast with NO CAFFEINE and take your insulin as normal.   ? AVOID ALL FOODS & DRINKS containing CAFFEINE 12 HOURS PRIOR TO THE TEST: Including coffee, Tea, Claire and other soft drinks even those labeled  caffeine free or decaffeinated.  ? HOLD THESE MEDICATIONS Persantine & Theophylline (Theodur)  24 hours prior & bring your inhaler with you.    The physician will specify if the following Beta blockers need to be held for 24 hours prior to test: n/a

## 2022-02-08 NOTE — PROGRESS NOTES
CARDIOLOGY CONSULT  NOTE    Chief Complaint: Shortness of breath    HPI:   Crystal Dillard is a 46y.o. year old who has history as noted below. Crystal Dillard is planned for right knee replacement surgery soon in about 3 weeks she has history of ongoing shortness of breath. Stress test in 2019 showed no ischemia  S he has significant bilateral external swelling. He is being evaluated by Dr. Gigi Best for bilateral ankle swelling and venous congestion. She reports intermittent chest pressure and pain when she ambulates or walks. She has sleep apnea but she is intolerant of CPAP      Current Outpatient Medications   Medication Sig Dispense Refill    gabapentin (NEURONTIN) 300 MG capsule       cyclobenzaprine (FLEXERIL) 10 MG tablet       FLUoxetine (PROZAC) 40 MG capsule take 1 capsule by oral route every day in the morning      ONETOUCH VERIO strip test 2 - 3 times a day by Transdermal route as directed      OneTouch Delica Lancets 24H MISC test UP TO THREE TIMES DAILY      Blood Glucose Monitoring Suppl (ONETOUCH VERIO FLEX SYSTEM) w/Device KIT USE AS DIRECTED      Alcohol Swabs ( STERILE ALCOHOL PREP) PADS take by Topical route every day      zolpidem (AMBIEN) 10 MG tablet Take 10 mg by mouth nightly.  rOPINIRole (REQUIP) 0.25 MG tablet TAKE 3 TABLETS BY MOUTH 2 HOURS BEFORE BED 90 tablet 1    traMADol (ULTRAM) 50 MG tablet       SPIRIVA RESPIMAT 2.5 MCG/ACT AERS inhaler   2    Cholecalciferol (VITAMIN D3) 25 MCG (1000 UT) TABS   2    levothyroxine (SYNTHROID) 50 MCG tablet   2    meloxicam (MOBIC) 15 MG tablet   2     No current facility-administered medications for this visit. Allergies:   Patient has no known allergies.     Patient History:  Past Medical History:   Diagnosis Date    Anxiety     COPD [facility]ch[facility] obstructive pulmonary disease) [facility])     Panic attacks     Separation anxiety     TIA (transient ischemic attack)      Past Surgical History: Procedure Laterality Date     SECTION      CHOLECYSTECTOMY      HYSTERECTOMY      KNEE CARTILAGE SURGERY      R knee 2002    TUBAL LIGATION       Family History   Problem Relation Age of Onset    Cancer Mother     Cancer Father      Social History     Tobacco Use    Smoking status: Current Every Day Smoker     Packs/day: 1.00     Years: 30.00     Pack years: 30.00     Types: Cigarettes    Smokeless tobacco: Never Used   Substance Use Topics    Alcohol use: No     Comment: occasional        Review of Systems:   · Constitutional: No Fever or Weight Loss   · Eyes: No Decreased Vision  · ENT: No Headaches, Hearing Loss or Vertigo  · Cardiovascular: as per note above   · Respiratory: No cough or wheezing and as per note above. Positive for sleep apnea  · Gastrointestinal: No abdominal pain, appetite loss, blood in stools, constipation, diarrhea or heartburn  · Genitourinary: No dysuria, trouble voiding, or hematuria  · Musculoskeletal:  None  · Integumentary: No rash or pruritis  · Neurological: No TIA or stroke symptoms  · Psychiatric: No anxiety or depression  · Endocrine: No malaise, fatigue or temperature intolerance  · Hematologic/Lymphatic: No bleeding problems, blood clots or swollen lymph nodes  · Allergic/Immunologic: No nasal congestion or hives    Objective:      Physical Exam:  /82   Pulse 77   Ht 5' 3\" (1.6 m)   Wt 261 lb 12.8 oz (118.8 kg)   BMI 46.38 kg/m²   Wt Readings from Last 3 Encounters:   22 261 lb 12.8 oz (118.8 kg)   22 261 lb (118.4 kg)   10/21/21 255 lb (115.7 kg)     Body mass index is 46.38 kg/m². Vitals:    22 1254   BP: 130/82   Pulse: 77        General Appearance:  No distress, conversant  Constitutional:  Well developed, Well nourished, No acute distress, Non-toxic appearance.    HENT:  Normocephalic, Atraumatic, Bilateral external ears normal, Oropharynx moist, No oral exudates, Nose normal. Neck- Normal range of motion, No tenderness, Supple, No stridor,no apical-carotid delay  Eyes:  PERRL, EOMI, Conjunctiva normal, No discharge. Respiratory:  Normal breath sounds, No respiratory distress, No wheezing, No chest tenderness. ,no use of accessory muscles, NO crackles  Cardiovascular: (PMI) apex non displaced,no lifts no thrills,S1 and S2 audible, No added heart sounds, No signs of ankle edema, or volume overload, No evidence of JVD, No crackles  GI:  Bowel sounds normal, Soft, No tenderness, No masses, No gross visceromegaly   :  No costovertebral angle tenderness   Musculoskeletal:  No edema, no tenderness, no deformities. Back- no tenderness  Integument:  Well hydrated, no rash   Lymphatic:  No lymphadenopathy noted   Neurologic:  Alert & oriented x 3, CN 2-12 normal, normal motor function, normal sensory function, no focal deficits noted   Psychiatric:  Speech and behavior appropriate       Medical decision making and Data review:  DATA:  No results found for: TROPONINT  BNP:  No results found for: PROBNP  PT/INR:  No results found for: PTINR  No results found for: LABA1C  No results found for: CHOL, TRIG, HDL, LDLCALC, LDLDIRECT  Lab Results   Component Value Date    ALT 41 (H) 05/29/2018    AST 22 05/29/2018     TSH: No results found for: TSH  Stress test 2018  Summary    Supervising physician Dr. Dewey Cheadle portion of stress test is negative for ischemia by diagnostic criteria.    Normal EF 77 % with normal ventricular contractility.    No infarct or ischemia noted.    Normal stress myocardial perfusion.    This is a normal study. All labs, medications and tests reviewed by myself including data and history from outside source , patient and available family . Assessment & Plan:      1. SOB (shortness of breath)    2. Tobacco abuse    3. ROXI (obstructive sleep apnea)    4.  Pre-op evaluation         SOB (shortness of breath)  Due to bilateral ankle swelling,, Orthopnea ,Rule out symptomatic CHF check  bnp , start lasix and get echo    Preoperative evaluation  Would recommend getting Jasbir Colón as she cannot walk due to knee pain for stratification    Tobacco abuse  She strongly encouraged to cut down on smoking     Dyslipidemia :  Kellie Umanzor had lab work recently,  Lipid profile was reviewed with patient. Counseled extensively and medication compliance urged. We discussed that for the  prevention of ASCVD our  goal is aggressive risk modification. Patient is encouraged to exercise even a brisk walk for 30 minutes  at least 3 to 4 times a week   Various goals were discussed and questions answered. Continue current medications. Appropriate prescriptions are addressed and refills ordered. Questions answered and patient verbalizes understanding. Call for any problems, questions, or concerns. Continue all other medications of all above medical condition listed as is. Return in about 1 month (around 3/8/2022). Please note this report has been partially produced using speech recognition software and may contain errors related to that system including errors in grammar, punctuation, and spelling, as well as words and phrases that may be inappropriate.  If there are any questions or concerns please feel free to contact the dictating provider for clarification.

## 2022-02-08 NOTE — LETTER
Jessenia Ackerman  1970  [id number]    Have you had any Chest Pain that is not new? - No  If Yes DO EKG - How does it feel -    How long does the pain last -      How long have you been having the pain -    Did you take a    And did it relieve the pain -      DO EKG IF: Patient has a Heart Rate above 100 or below 40     CAD (Coronary Artery Disease) patient should have one on file every 6 months        Have you had any Shortness of Breath - No  If Yes - When     Have you had any dizziness - No  If Yes DO ORTHOSTATIC BP - when do you feel dizzy    How long does it last .        Sitting wait 5 minutes do supine (laying down) wait 5 minutes then do standing - log each in \"vitals\" area in Epic   Be sure to ask what symptoms they are having if they get dizzy while completing ortho stats such as: room spinning, nausea, etc.    Have you had any palpitations that are not new? -   If Yes DO EKG - Do you feel your heart   How long does it last - . Is the patient on any of the following medications -   If Yes DO EKG - Needs done every 6 months    Do you have any edema - swelling in No    If Yes - CHECK TO SEE IF THE EDEMA IS PITTING  How long have they been having edema -   If Yes - Have they worn compression stockings   If they have worn compression stockings      Vein \"LEG PROBLEM Questionnaire\"  1. Do you have prominent leg veins? 2. Do you have any skin discoloration? 3. Do you have any healed or active sores? 4. Do you have swelling of the legs? 5. Do you have a family history of varicose veins? 6. Does your profession involve pro-longed        standing or heavy lifting? 7. Have you been fighting overweight problems? 8. Do you have restless legs? 9. Do you have any night time cramps?     10. Do you have any of the following in your legs:             When did you have your last labs drawn   Where did you have them done What doctor ordered   If we do not have these labs you are retrieve these labs for these providers!     Do you have a surgery or procedure scheduled in the near future - Yes  Cardiologist: Dr. Aaron Castillo  Surgeon: Dr. Eloy Caraballo  Surgery: Right total knee arthroplasty  Anesthesia: Spinal/ nerve block  Date: 2/21/2022  FAX# [phone]  # [phone]

## 2022-02-10 ENCOUNTER — TELEPHONE (OUTPATIENT)
Dept: CARDIOLOGY CLINIC | Age: 52
End: 2022-02-10

## 2022-02-15 ENCOUNTER — TELEPHONE (OUTPATIENT)
Dept: ORTHOPEDIC SURGERY | Age: 52
End: 2022-02-15

## 2022-02-15 NOTE — TELEPHONE ENCOUNTER
Scheduled patient in office for RT TKA ON 2/21/2022 at [facility]. Patient instructed to get Covid testing done one week prior to surgery date. Patient voiced understanding. Procedure request along with pathway orders faxed, Covid, Physical Therapy and CT Scan orders have been created. PCP, Cardiac and Pulmonary  Clearance request sent.  Insurance contacted [employer], obtained [employer], patient now has [employer]ila, contacted them on 2/14/22 and it is PENDING

## 2022-02-15 NOTE — PROGRESS NOTES
2/15/22 1529 I left a voicemail with my call back number and a request to edgar return my call to complete the phone PAT assessment, let me know when you can do labs, CXR, covid test. I left pre-op instructions. ADDENDUM: 2/16/22  0801 Patient called and completed phone PAT and is coming in to done labs, covid test, & CXR today, 2/16/22. Surgery is at [facility] on 2/21/22. You will be called on  2/18/22 with times. 1. Do not eat or drink anything after midnight - unless instructed by your doctor prior to surgery. This includes no water, chewing gum or mints. 2. Follow your directions as prescribed by the doctor for your procedure and medications. 3. Check with your Doctor regarding stopping vitamins, supplements, blood thinners (Plavix, Coumadin, Lovenox, Effient, Pradaxa, Xarelto, Fragmin or other blood thinners) and follow their instructions. Stop all supplements and herbals 7 days prior to surgery. Morning of surgery, do not take any medications. 4. Do not smoke, and do not drink any alcoholic beverages 24 hours prior to surgery. This includes NA Beer. 5. You may brush your teeth and gargle the morning of surgery. DO NOT SWALLOW WATER   6. You MUST make arrangements for a responsible adult to take you home after your surgery and be able to check on you every couple hours for the day. You will not be allowed to leave alone or drive yourself home. It is strongly suggested someone stay with you the first 24  hrs. Your surgery will be cancelled if you do not have a ride home. 7. Please wear simple, loose fitting clothing to the hospital.  Louvella Castleman not bring valuables (money, credit cards, checkbooks, etc.) Do not wear any makeup (including no eye makeup) or nail polish on your fingers or toes. 8. DO NOT wear any jewelry or piercings on day of surgery. All body piercing jewelry must be removed.              9. If you have dentures, they will be removed before going to the OR; we will provide you a container. If you wear contact lenses or glasses,  they will be removed; please bring a case for them. 10. If you  have a Living Will and Durable Power of  for Healthcare, please bring in a copy. 11. Please bring picture ID,  insurance card, paperwork from the doctors office    (H & P, Consent, & card for implantable devices). 12. Take a shower the night before or morning of your procedure, do not apply any lotion, oil or powder. It is recommended to use Hibiclens or CHG wash during pre-op shower/bath. 13. Wear a mask covering your nose & mouth when entering the hospital. Have your covid-19 test performed within 2-7 days of your                  surgery. Quarantine yourself after the test until after your surgery.   COVID TEST, labs, & CXR at [facility] on 2/16/22

## 2022-02-16 ENCOUNTER — HOSPITAL ENCOUNTER (OUTPATIENT)
Age: 52
Discharge: HOME | End: 2022-02-16
Payer: MEDICARE

## 2022-02-16 ENCOUNTER — HOSPITAL ENCOUNTER (OUTPATIENT)
Dept: GENERAL RADIOLOGY | Age: 52
Discharge: HOME | End: 2022-02-16
Payer: MEDICARE

## 2022-02-16 DIAGNOSIS — Z01.818 ENCOUNTER FOR PREADMISSION TESTING: ICD-10-CM

## 2022-02-16 LAB
ALBUMIN SERPL-MCNC: 4.2 GM/DL (ref 3.4–5)
ALP BLD-CCNC: 88 IU/L (ref 40–128)
ALT SERPL-CCNC: 52 U/L (ref 10–40)
ANION GAP SERPL CALCULATED.3IONS-SCNC: 10 MMOL/L (ref 4–16)
AST SERPL-CCNC: 30 IU/L (ref 15–37)
BACTERIA: NEGATIVE /HPF
BASOPHILS ABSOLUTE: 0.1 K/CU MM
BASOPHILS RELATIVE PERCENT: 0.9 % (ref 0–1)
BILIRUB SERPL-MCNC: 0.5 MG/DL (ref 0–1)
BILIRUBIN URINE: NEGATIVE MG/DL
BLOOD, URINE: NEGATIVE
BUN BLDV-MCNC: 15 MG/DL (ref 6–23)
CALCIUM SERPL-MCNC: 9.9 MG/DL (ref 8.3–10.6)
CHLORIDE BLD-SCNC: 104 MMOL/L (ref 99–110)
CLARITY: CLEAR
CO2: 27 MMOL/L (ref 21–32)
COLOR: YELLOW
CREAT SERPL-MCNC: 0.7 MG/DL (ref 0.6–1.1)
DIFFERENTIAL TYPE: ABNORMAL
EOSINOPHILS ABSOLUTE: 0.6 K/CU MM
EOSINOPHILS RELATIVE PERCENT: 7.1 % (ref 0–3)
ERYTHROCYTE SEDIMENTATION RATE: 22 MM/HR (ref 0–30)
GFR AFRICAN AMERICAN: >60 ML/MIN/1.73M2
GFR NON-AFRICAN AMERICAN: >60 ML/MIN/1.73M2
GLUCOSE BLD-MCNC: 105 MG/DL (ref 70–99)
GLUCOSE, URINE: NEGATIVE MG/DL
HCT VFR BLD CALC: 46.9 % (ref 37–47)
HEMOGLOBIN: 15.6 GM/DL (ref 12.5–16)
IMMATURE NEUTROPHIL %: 0.6 % (ref 0–0.43)
KETONES, URINE: NEGATIVE MG/DL
LEUKOCYTE ESTERASE, URINE: NEGATIVE
LYMPHOCYTES ABSOLUTE: 2.7 K/CU MM
LYMPHOCYTES RELATIVE PERCENT: 32.8 % (ref 24–44)
MCH RBC QN AUTO: 30.5 PG (ref 27–31)
MCHC RBC AUTO-ENTMCNC: 33.3 % (ref 32–36)
MCV RBC AUTO: 91.6 FL (ref 78–100)
MONOCYTES ABSOLUTE: 0.7 K/CU MM
MONOCYTES RELATIVE PERCENT: 9.1 % (ref 0–4)
MUCUS: ABNORMAL HPF
NITRITE URINE, QUANTITATIVE: NEGATIVE
NUCLEATED RBC %: 0 %
PDW BLD-RTO: 13.9 % (ref 11.7–14.9)
PH, URINE: 5.5 (ref 5–8)
PLATELET # BLD: 181 K/CU MM (ref 140–440)
PMV BLD AUTO: 11.6 FL (ref 7.5–11.1)
POTASSIUM SERPL-SCNC: 4.5 MMOL/L (ref 3.5–5.1)
PROTEIN UA: NEGATIVE MG/DL
RBC # BLD: 5.12 M/CU MM (ref 4.2–5.4)
RBC URINE: <1 /HPF (ref 0–6)
SARS-COV-2: NOT DETECTED
SEGMENTED NEUTROPHILS ABSOLUTE COUNT: 4 K/CU MM
SEGMENTED NEUTROPHILS RELATIVE PERCENT: 49.5 % (ref 36–66)
SODIUM BLD-SCNC: 141 MMOL/L (ref 135–145)
SOURCE: NORMAL
SPECIFIC GRAVITY UA: >1.03 (ref 1–1.03)
SQUAMOUS EPITHELIAL: <1 /HPF
TOTAL IMMATURE NEUTOROPHIL: 0.05 K/CU MM
TOTAL NUCLEATED RBC: 0 K/CU MM
TOTAL PROTEIN: 6.9 GM/DL (ref 6.4–8.2)
UROBILINOGEN, URINE: ABNORMAL MG/DL (ref 0.2–1)
WBC # BLD: 8.2 K/CU MM (ref 4–10.5)
WBC UA: 1 /HPF (ref 0–5)

## 2022-02-16 PROCEDURE — U0005 INFEC AGEN DETEC AMPLI PROBE: HCPCS

## 2022-02-16 PROCEDURE — 87086 URINE CULTURE/COLONY COUNT: CPT

## 2022-02-16 PROCEDURE — 85652 RBC SED RATE AUTOMATED: CPT

## 2022-02-16 PROCEDURE — 36415 COLL VENOUS BLD VENIPUNCTURE: CPT

## 2022-02-16 PROCEDURE — 71046 X-RAY EXAM CHEST 2 VIEWS: CPT

## 2022-02-16 PROCEDURE — 80053 COMPREHEN METABOLIC PANEL: CPT

## 2022-02-16 PROCEDURE — U0003 INFECTIOUS AGENT DETECTION BY NUCLEIC ACID (DNA OR RNA); SEVERE ACUTE RESPIRATORY SYNDROME CORONAVIRUS 2 (SARS-COV-2) (CORONAVIRUS DISEASE [COVID-19]), AMPLIFIED PROBE TECHNIQUE, MAKING USE OF HIGH THROUGHPUT TECHNOLOGIES AS DESCRIBED BY CMS-2020-01-R: HCPCS

## 2022-02-16 PROCEDURE — 81001 URINALYSIS AUTO W/SCOPE: CPT

## 2022-02-16 PROCEDURE — 85025 COMPLETE CBC W/AUTO DIFF WBC: CPT

## 2022-02-17 LAB
CULTURE: NORMAL
Lab: NORMAL
SPECIMEN: NORMAL

## 2022-02-18 ENCOUNTER — PROCEDURE VISIT (OUTPATIENT)
Dept: CARDIOLOGY CLINIC | Age: 52
End: 2022-02-18
Payer: MEDICARE

## 2022-02-18 ENCOUNTER — ANESTHESIA EVENT (OUTPATIENT)
Dept: OPERATING ROOM | Age: 52
End: 2022-02-18
Payer: MEDICARE

## 2022-02-18 DIAGNOSIS — G47.33 OSA (OBSTRUCTIVE SLEEP APNEA): ICD-10-CM

## 2022-02-18 DIAGNOSIS — R06.02 SOB (SHORTNESS OF BREATH): ICD-10-CM

## 2022-02-18 DIAGNOSIS — Z01.818 PRE-OP EVALUATION: Primary | ICD-10-CM

## 2022-02-18 DIAGNOSIS — Z72.0 TOBACCO ABUSE: Chronic | ICD-10-CM

## 2022-02-18 LAB
LV EF: 73 %
LVEF MODALITY: NORMAL

## 2022-02-18 PROCEDURE — 93015 CV STRESS TEST SUPVJ I&R: CPT | Performed by: INTERNAL MEDICINE

## 2022-02-18 PROCEDURE — A9500 TC99M SESTAMIBI: HCPCS | Performed by: INTERNAL MEDICINE

## 2022-02-18 PROCEDURE — 78452 HT MUSCLE IMAGE SPECT MULT: CPT | Performed by: INTERNAL MEDICINE

## 2022-02-18 ASSESSMENT — ENCOUNTER SYMPTOMS: SHORTNESS OF BREATH: 1

## 2022-02-18 ASSESSMENT — LIFESTYLE VARIABLES: SMOKING_STATUS: 1

## 2022-02-18 NOTE — ANESTHESIA PRE PROCEDURE
Department of Anesthesiology  Preprocedure Note       Name:  Tammy Garzon   Age:  46 y.o.  :  1970                                          MRN:  [id number]         Date:  2022      Surgeon: Daniel Rodríguez):  Guero Arceo MD    Procedure: Procedure(s):  RIGHT KNEE TOTAL ARTHROPLASTY ROBOTIC    Medications prior to admission:   Prior to Admission medications    Medication Sig Start Date End Date Taking? Authorizing Provider   gabapentin (NEURONTIN) 300 MG capsule  22  Yes Historical Provider, MD   cyclobenzaprine (FLEXERIL) 10 MG tablet  22  Yes Historical Provider, MD   FLUoxetine (PROZAC) 40 MG capsule take 1 capsule by oral route every day in the morning 21  Yes Historical Provider, MD   zolpidem (AMBIEN) 10 MG tablet Take 10 mg by mouth nightly. Yes Historical Provider, MD   rOPINIRole (REQUIP) 0.25 MG tablet TAKE 3 TABLETS BY MOUTH 2 HOURS BEFORE BED 21  Yes Theola Irene, MD   traMADol (ULTRAM) 50 MG tablet Take 50 mg by mouth every 12 hours. 20  Yes Historical Provider, MD Miguelina Hyde 2.5 MCG/ACT AERS inhaler  19  Yes Historical Provider, MD   Cholecalciferol (VITAMIN D3) 25 MCG (1000 UT) TABS  10/11/19  Yes Historical Provider, MD   levothyroxine (SYNTHROID) 50 MCG tablet  10/17/19  Yes Historical Provider, MD   meloxicam (MOBIC) 15 MG tablet  10/11/19  Yes Historical Provider, MD Mary Kerr strip test 2 - 3 times a day by Transdermal route as directed 21   Historical Provider, MD Lorenzana Delica [address]ets [id number] [facility] test UP  [address] [facility] [address]   Historical Provider, MD   Blood Glucose Monitoring Suppl ([address]) w/Device KIT USE AS DIRECTED 10/27/21   Historical Provider, MD   Alcohol Swabs ( STERILE ALCOHOL PREP) PADS take by Topical route every day 21   Historical Provider, MD       Current medications:    No current facility-administered medications for this encounter.      Current Outpatient Medications   Medication Sig Dispense Refill    gabapentin (NEURONTIN) 300 MG capsule       cyclobenzaprine (FLEXERIL) 10 MG tablet       FLUoxetine (PROZAC) 40 MG capsule take 1 capsule by oral route every day in the morning      zolpidem (AMBIEN) 10 MG tablet Take 10 mg by mouth nightly.  rOPINIRole (REQUIP) 0.25 MG tablet TAKE 3 TABLETS BY MOUTH 2 HOURS BEFORE BED 90 tablet 1    traMADol (ULTRAM) 50 MG tablet Take 50 mg by mouth every 12 hours.        SPIRIVA RESPIMAT 2.5 MCG/ACT AERS inhaler   2    Cholecalciferol (VITAMIN D3) 25 MCG (1000 UT) TABS   2    levothyroxine (SYNTHROID) 50 MCG tablet   2    meloxicam (MOBIC) 15 MG tablet   2    ONETOUCH VERIO strip test 2 - 3 times a day by Transdermal route as directed      OneTouch Delica Lancets 78O MISC test UP TO THREE TIMES DAILY      Blood Glucose Monitoring Suppl (ONETOUCH VERIO FLEX SYSTEM) w/Device KIT USE AS DIRECTED      Alcohol Swabs ( STERILE ALCOHOL PREP) PADS take by Topical route every day         Allergies:  No Known Allergies    Problem List:    Patient Active Problem List   Diagnosis Code    Chest pain R07.9    Left sided numbness R20.0    Bipolar 1 disorder (HCC) F31.9    Tobacco abuse Z72.0    SOB (shortness of breath) R06.02    Excessive daytime sleepiness G47.19    SOB (shortness of breath) on exertion R06.02    Anemia D64.9    Restless leg syndrome G25.81    ROXI (obstructive sleep apnea) G47.33    Pre-op evaluation Z01.818       Past Medical History:        Diagnosis Date    Anxiety     COPD [facility]ch[facility] obstructive pulmonary disease) ([facility])     Hypoglycemia     history    Panic attacks     Separation anxiety     TIA (transient ischemic attack)        Past Surgical History:        Procedure Laterality Date     SECTION      CHOLECYSTECTOMY      HYSTERECTOMY      KNEE CARTILAGE SURGERY      R knee 2002    TUBAL LIGATION         Social History:    Social History     Tobacco Use    Smoking status: Current Every Day Smoker     Packs/day: 1.00     Years: 30.00     Pack years: 30.00     Types: Cigarettes    Smokeless tobacco: Never Used   Substance Use Topics    Alcohol use: No     Comment: occasional                                Ready to quit: Not Answered  Counseling given: Not Answered      Vital Signs (Current):   Vitals:    02/16/22 0803   Weight: 261 lb (118.4 kg)   Height: 5' 3\" (1.6 m)                                              BP Readings from Last 3 Encounters:   02/08/22 130/82   08/14/21 (!) 149/63   11/07/19 126/60       NPO Status:                                                                                 BMI:   Wt Readings from Last 3 Encounters:   02/08/22 261 lb 12.8 oz (118.8 kg)   01/13/22 261 lb (118.4 kg)   10/21/21 255 lb (115.7 kg)     Body mass index is 46.23 kg/m². CBC:   Lab Results   Component Value Date    WBC 8.2 02/16/2022    RBC 5.12 02/16/2022    HGB 15.6 02/16/2022    HCT 46.9 02/16/2022    MCV 91.6 02/16/2022    RDW 13.9 02/16/2022     02/16/2022       CMP:   Lab Results   Component Value Date     02/16/2022    K 4.5 02/16/2022     02/16/2022    CO2 27 02/16/2022    BUN 15 02/16/2022    CREATININE 0.7 02/16/2022    GFRAA >60 02/16/2022    LABGLOM >60 02/16/2022    GLUCOSE 105 02/16/2022    PROT 6.9 02/16/2022    PROT 7.3 11/29/2012    CALCIUM 9.9 02/16/2022    BILITOT 0.5 02/16/2022    ALKPHOS 88 02/16/2022    AST 30 02/16/2022    ALT 52 02/16/2022       POC Tests: No results for input(s): POCGLU, POCNA, POCK, POCCL, POCBUN, POCHEMO, POCHCT in the last 72 hours.     Coags:   Lab Results   Component Value Date    PROTIME 11.2 11/29/2012    INR 1.02 11/29/2012    APTT 20.0 11/29/2012       HCG (If Applicable):   Lab Results   Component Value Date    PREGTESTUR neg 06/08/2013    PREGTESTUR NEGATIVE 06/08/2013        ABGs: No results found for: PHART, PO2ART, IWB1EHL, GOP2QUL, BEART, T4CJYTRN     Type & Screen (If Applicable):  No results found for: LABABO, [address]    Drug/Infectious Status (If Applicable):  Lab Results   Component Value Date    HEPCAB NON REACTIVE 11/29/2012       COVID-19 Screening (If Applicable):   Lab Results   Component Value Date    COVID19 NOT DETECTED 02/16/2022           Anesthesia Evaluation  Patient summary reviewed  Airway:         Dental:          Pulmonary:   (+) COPD:  shortness of breath:  sleep apnea:  current smoker                           Cardiovascular:    (+) HURTADO:,       ECG reviewed      Echocardiogram reviewed  Stress test reviewed             ROS comment: Summary   Supervising physician Dr. Cristina Mustafa portion of stress test is negative for ischemia by diagnostic criteria.   Normal EF 77 % with normal ventricular contractility.   No infarct or ischemia noted.   Normal stress myocardial perfusion.   This is a normal study.      Signatures      ------------------------------------------------------------------   Electronically signed by Rashawn Silverman MD (Interpreting   cardiologist) on 09/21/2018 at 16:21   ------------------------------------------------------------------   Summary   Limited study due to patients body habitus. Left ventricular function and size is normal, EF is estimated at 55-60%. Moderate left ventricular hypertrophy. Grade I diastolic dysfunction. No regional wall motion abnormalities were detected. No significant valvular abnormalities. No evidence of pericardial effusion. Signature      ------------------------------------------------------------------   Electronically signed by Rashawn Silverman MD (Interpreting   physician) on 02/08/2022 at 07:02 PM    Sinus  Rhythm   Low voltage in precordial leads. -RSR(V1) -nondiagnostic.      ABNORMAL 2/22    Summary   Limited study due to patients body habitus.   Left ventricular function and size is normal, EF is estimated at 55-60%.   Moderate left ventricular hypertrophy.   Grade I diastolic dysfunction.   No regional wall motion abnormalities were detected.   No significant valvular abnormalities.   No evidence of pericardial effusion.     Left message regarding patients Normal ECHO test results per communications document. 2/2022     Neuro/Psych:   (+) TIA, psychiatric history:depression/anxiety             GI/Hepatic/Renal:   (+) morbid obesity          Endo/Other:    (+) Diabetes, hypothyroidism, blood dyscrasia: anemia, arthritis: OA., .                 Abdominal:             Vascular: Other Findings:           Anesthesia Plan      general, MAC, regional and spinal     ASA 3     (Chart review only 2/18/22  covid - 2/16/22  Awaiting pulmonary and cardio clearance )  Induction: intravenous. MIPS: Postoperative opioids intended and Prophylactic antiemetics administered.                     MERE Tam - CRNA   2/18/2022

## 2022-02-18 NOTE — PROGRESS NOTES
0  Unable to leave voicemail because it say \"th person you are call ing is not accepting calls at this time. 1539  I spoke to patients son, Laurie Toure and gave him the message to give to the patient stating \"surgery is at [facility] on 2/21/22 at 0930 and patient is to arrive at 0730. \"

## 2022-02-21 ENCOUNTER — HOSPITAL ENCOUNTER (OUTPATIENT)
Age: 52
Setting detail: OBSERVATION
Discharge: HOME | End: 2022-02-22
Attending: ORTHOPAEDIC SURGERY | Admitting: ORTHOPAEDIC SURGERY
Payer: MEDICARE

## 2022-02-21 ENCOUNTER — ANESTHESIA (OUTPATIENT)
Dept: OPERATING ROOM | Age: 52
End: 2022-02-21
Payer: MEDICARE

## 2022-02-21 ENCOUNTER — APPOINTMENT (OUTPATIENT)
Dept: GENERAL RADIOLOGY | Age: 52
End: 2022-02-21
Attending: ORTHOPAEDIC SURGERY
Payer: MEDICARE

## 2022-02-21 VITALS
DIASTOLIC BLOOD PRESSURE: 64 MMHG | OXYGEN SATURATION: 97 % | TEMPERATURE: 98.7 F | SYSTOLIC BLOOD PRESSURE: 117 MMHG | RESPIRATION RATE: 25 BRPM

## 2022-02-21 DIAGNOSIS — Z01.818 ENCOUNTER FOR PREADMISSION TESTING: Primary | ICD-10-CM

## 2022-02-21 DIAGNOSIS — Z96.651 S/P TOTAL KNEE REPLACEMENT, RIGHT: ICD-10-CM

## 2022-02-21 LAB — GLUCOSE BLD-MCNC: 108 MG/DL (ref 70–99)

## 2022-02-21 PROCEDURE — 6360000002 HC RX W HCPCS

## 2022-02-21 PROCEDURE — 3600000019 HC SURGERY ROBOT ADDTL 15MIN: Performed by: ORTHOPAEDIC SURGERY

## 2022-02-21 PROCEDURE — 6360000002 HC RX W HCPCS: Performed by: ORTHOPAEDIC SURGERY

## 2022-02-21 PROCEDURE — 6370000000 HC RX 637 (ALT 250 FOR IP): Performed by: ORTHOPAEDIC SURGERY

## 2022-02-21 PROCEDURE — 2500000003 HC RX 250 WO HCPCS: Performed by: ORTHOPAEDIC SURGERY

## 2022-02-21 PROCEDURE — 64447 NJX AA&/STRD FEMORAL NRV IMG: CPT | Performed by: ANESTHESIOLOGY

## 2022-02-21 PROCEDURE — 3700000001 HC ADD 15 MINUTES (ANESTHESIA): Performed by: ORTHOPAEDIC SURGERY

## 2022-02-21 PROCEDURE — 27447 TOTAL KNEE ARTHROPLASTY: CPT

## 2022-02-21 PROCEDURE — 6370000000 HC RX 637 (ALT 250 FOR IP): Performed by: PHYSICIAN ASSISTANT

## 2022-02-21 PROCEDURE — 2709999900 HC NON-CHARGEABLE SUPPLY: Performed by: ORTHOPAEDIC SURGERY

## 2022-02-21 PROCEDURE — 73560 X-RAY EXAM OF KNEE 1 OR 2: CPT

## 2022-02-21 PROCEDURE — 2580000003 HC RX 258: Performed by: ORTHOPAEDIC SURGERY

## 2022-02-21 PROCEDURE — C1776 JOINT DEVICE (IMPLANTABLE): HCPCS | Performed by: ORTHOPAEDIC SURGERY

## 2022-02-21 PROCEDURE — 97116 GAIT TRAINING THERAPY: CPT

## 2022-02-21 PROCEDURE — 6360000002 HC RX W HCPCS: Performed by: ANESTHESIOLOGY

## 2022-02-21 PROCEDURE — C1713 ANCHOR/SCREW BN/BN,TIS/BN: HCPCS | Performed by: ORTHOPAEDIC SURGERY

## 2022-02-21 PROCEDURE — G0378 HOSPITAL OBSERVATION PER HR: HCPCS

## 2022-02-21 PROCEDURE — 27447 TOTAL KNEE ARTHROPLASTY: CPT | Performed by: ORTHOPAEDIC SURGERY

## 2022-02-21 PROCEDURE — 82962 GLUCOSE BLOOD TEST: CPT

## 2022-02-21 PROCEDURE — 2500000003 HC RX 250 WO HCPCS

## 2022-02-21 PROCEDURE — 3600000009 HC SURGERY ROBOT BASE: Performed by: ORTHOPAEDIC SURGERY

## 2022-02-21 PROCEDURE — 6370000000 HC RX 637 (ALT 250 FOR IP): Performed by: NURSE ANESTHETIST, CERTIFIED REGISTERED

## 2022-02-21 PROCEDURE — S2900 ROBOTIC SURGICAL SYSTEM: HCPCS | Performed by: ORTHOPAEDIC SURGERY

## 2022-02-21 PROCEDURE — 20985 CPTR-ASST DIR MS PX: CPT | Performed by: ORTHOPAEDIC SURGERY

## 2022-02-21 PROCEDURE — 97162 PT EVAL MOD COMPLEX 30 MIN: CPT

## 2022-02-21 PROCEDURE — 64450 NJX AA&/STRD OTHER PN/BRANCH: CPT

## 2022-02-21 PROCEDURE — 2500000003 HC RX 250 WO HCPCS: Performed by: NURSE ANESTHETIST, CERTIFIED REGISTERED

## 2022-02-21 PROCEDURE — 6360000002 HC RX W HCPCS: Performed by: NURSE ANESTHETIST, CERTIFIED REGISTERED

## 2022-02-21 PROCEDURE — 2580000003 HC RX 258

## 2022-02-21 PROCEDURE — 3700000000 HC ANESTHESIA ATTENDED CARE: Performed by: ORTHOPAEDIC SURGERY

## 2022-02-21 PROCEDURE — 2720000010 HC SURG SUPPLY STERILE: Performed by: ORTHOPAEDIC SURGERY

## 2022-02-21 PROCEDURE — 7100000001 HC PACU RECOVERY - ADDTL 15 MIN: Performed by: ORTHOPAEDIC SURGERY

## 2022-02-21 PROCEDURE — 7100000000 HC PACU RECOVERY - FIRST 15 MIN: Performed by: ORTHOPAEDIC SURGERY

## 2022-02-21 DEVICE — CEMENT BNE 40GM W/ GENT HI VISC RADPQ FOR REV SURG: Type: IMPLANTABLE DEVICE | Site: KNEE | Status: FUNCTIONAL

## 2022-02-21 RX ORDER — LEVOTHYROXINE SODIUM 0.03 MG/1
50 TABLET ORAL NIGHTLY
Status: DISCONTINUED | OUTPATIENT
Start: 2022-02-21 | End: 2022-02-22 | Stop reason: HOSPADM

## 2022-02-21 RX ORDER — SODIUM CHLORIDE, SODIUM LACTATE, POTASSIUM CHLORIDE, CALCIUM CHLORIDE 600; 310; 30; 20 MG/100ML; MG/100ML; MG/100ML; MG/100ML
INJECTION, SOLUTION INTRAVENOUS CONTINUOUS PRN
Status: DISCONTINUED | OUTPATIENT
Start: 2022-02-21 | End: 2022-02-21 | Stop reason: SDUPTHER

## 2022-02-21 RX ORDER — FENTANYL CITRATE 50 UG/ML
50 INJECTION, SOLUTION INTRAMUSCULAR; INTRAVENOUS EVERY 5 MIN PRN
Status: DISCONTINUED | OUTPATIENT
Start: 2022-02-21 | End: 2022-02-21 | Stop reason: HOSPADM

## 2022-02-21 RX ORDER — ONDANSETRON 2 MG/ML
INJECTION INTRAMUSCULAR; INTRAVENOUS PRN
Status: DISCONTINUED | OUTPATIENT
Start: 2022-02-21 | End: 2022-02-21 | Stop reason: SDUPTHER

## 2022-02-21 RX ORDER — PHENYLEPHRINE HCL IN 0.9% NACL 1 MG/10 ML
SYRINGE (ML) INTRAVENOUS PRN
Status: DISCONTINUED | OUTPATIENT
Start: 2022-02-21 | End: 2022-02-21 | Stop reason: SDUPTHER

## 2022-02-21 RX ORDER — OXYCODONE HYDROCHLORIDE AND ACETAMINOPHEN 5; 325 MG/1; MG/1
2 TABLET ORAL EVERY 4 HOURS PRN
Status: DISCONTINUED | OUTPATIENT
Start: 2022-02-21 | End: 2022-02-22 | Stop reason: HOSPADM

## 2022-02-21 RX ORDER — METOCLOPRAMIDE HYDROCHLORIDE 5 MG/ML
10 INJECTION INTRAMUSCULAR; INTRAVENOUS
Status: DISCONTINUED | OUTPATIENT
Start: 2022-02-21 | End: 2022-02-21 | Stop reason: HOSPADM

## 2022-02-21 RX ORDER — GABAPENTIN 300 MG/1
300 CAPSULE ORAL NIGHTLY
Status: DISCONTINUED | OUTPATIENT
Start: 2022-02-21 | End: 2022-02-22 | Stop reason: HOSPADM

## 2022-02-21 RX ORDER — PROPOFOL 10 MG/ML
INJECTION, EMULSION INTRAVENOUS PRN
Status: DISCONTINUED | OUTPATIENT
Start: 2022-02-21 | End: 2022-02-21 | Stop reason: SDUPTHER

## 2022-02-21 RX ORDER — MIDAZOLAM HYDROCHLORIDE 1 MG/ML
INJECTION INTRAMUSCULAR; INTRAVENOUS PRN
Status: DISCONTINUED | OUTPATIENT
Start: 2022-02-21 | End: 2022-02-21 | Stop reason: SDUPTHER

## 2022-02-21 RX ORDER — CYCLOBENZAPRINE HCL 10 MG
10 TABLET ORAL 2 TIMES DAILY PRN
Status: DISCONTINUED | OUTPATIENT
Start: 2022-02-21 | End: 2022-02-22 | Stop reason: HOSPADM

## 2022-02-21 RX ORDER — HYDRALAZINE HYDROCHLORIDE 20 MG/ML
10 INJECTION INTRAMUSCULAR; INTRAVENOUS
Status: DISCONTINUED | OUTPATIENT
Start: 2022-02-21 | End: 2022-02-21 | Stop reason: HOSPADM

## 2022-02-21 RX ORDER — NICOTINE 21 MG/24HR
1 PATCH, TRANSDERMAL 24 HOURS TRANSDERMAL DAILY
Status: DISCONTINUED | OUTPATIENT
Start: 2022-02-21 | End: 2022-02-22 | Stop reason: HOSPADM

## 2022-02-21 RX ORDER — FLUOXETINE HYDROCHLORIDE 20 MG/1
40 CAPSULE ORAL DAILY
Status: DISCONTINUED | OUTPATIENT
Start: 2022-02-21 | End: 2022-02-22 | Stop reason: HOSPADM

## 2022-02-21 RX ORDER — ONDANSETRON 4 MG/1
4 TABLET, ORALLY DISINTEGRATING ORAL EVERY 8 HOURS PRN
Status: DISCONTINUED | OUTPATIENT
Start: 2022-02-21 | End: 2022-02-22 | Stop reason: HOSPADM

## 2022-02-21 RX ORDER — SODIUM CHLORIDE, SODIUM LACTATE, POTASSIUM CHLORIDE, CALCIUM CHLORIDE 600; 310; 30; 20 MG/100ML; MG/100ML; MG/100ML; MG/100ML
INJECTION, SOLUTION INTRAVENOUS ONCE
Status: COMPLETED | OUTPATIENT
Start: 2022-02-21 | End: 2022-02-22

## 2022-02-21 RX ORDER — SODIUM CHLORIDE 9 MG/ML
25 INJECTION, SOLUTION INTRAVENOUS PRN
Status: DISCONTINUED | OUTPATIENT
Start: 2022-02-21 | End: 2022-02-22 | Stop reason: HOSPADM

## 2022-02-21 RX ORDER — VITAMIN B COMPLEX
1000 TABLET ORAL DAILY
Status: DISCONTINUED | OUTPATIENT
Start: 2022-02-21 | End: 2022-02-22 | Stop reason: HOSPADM

## 2022-02-21 RX ORDER — DIPHENHYDRAMINE HYDROCHLORIDE 50 MG/ML
12.5 INJECTION INTRAMUSCULAR; INTRAVENOUS
Status: DISCONTINUED | OUTPATIENT
Start: 2022-02-21 | End: 2022-02-21 | Stop reason: HOSPADM

## 2022-02-21 RX ORDER — LABETALOL HYDROCHLORIDE 5 MG/ML
10 INJECTION, SOLUTION INTRAVENOUS
Status: DISCONTINUED | OUTPATIENT
Start: 2022-02-21 | End: 2022-02-21 | Stop reason: HOSPADM

## 2022-02-21 RX ORDER — LIDOCAINE HYDROCHLORIDE 40 MG/ML
SOLUTION TOPICAL PRN
Status: DISCONTINUED | OUTPATIENT
Start: 2022-02-21 | End: 2022-02-21 | Stop reason: SDUPTHER

## 2022-02-21 RX ORDER — MEPERIDINE HYDROCHLORIDE 25 MG/ML
12.5 INJECTION INTRAMUSCULAR; INTRAVENOUS; SUBCUTANEOUS EVERY 5 MIN PRN
Status: DISCONTINUED | OUTPATIENT
Start: 2022-02-21 | End: 2022-02-21 | Stop reason: HOSPADM

## 2022-02-21 RX ORDER — SODIUM CHLORIDE 0.9 % (FLUSH) 0.9 %
10 SYRINGE (ML) INJECTION PRN
Status: DISCONTINUED | OUTPATIENT
Start: 2022-02-21 | End: 2022-02-22 | Stop reason: HOSPADM

## 2022-02-21 RX ORDER — ACETAMINOPHEN 325 MG/1
650 TABLET ORAL EVERY 6 HOURS
Status: DISCONTINUED | OUTPATIENT
Start: 2022-02-21 | End: 2022-02-22 | Stop reason: HOSPADM

## 2022-02-21 RX ORDER — HYDROMORPHONE HCL 110MG/55ML
0.5 PATIENT CONTROLLED ANALGESIA SYRINGE INTRAVENOUS EVERY 5 MIN PRN
Status: DISCONTINUED | OUTPATIENT
Start: 2022-02-21 | End: 2022-02-21 | Stop reason: HOSPADM

## 2022-02-21 RX ORDER — ZOLPIDEM TARTRATE 5 MG/1
10 TABLET ORAL NIGHTLY
Status: DISCONTINUED | OUTPATIENT
Start: 2022-02-21 | End: 2022-02-22 | Stop reason: HOSPADM

## 2022-02-21 RX ORDER — HYDROMORPHONE HCL 110MG/55ML
PATIENT CONTROLLED ANALGESIA SYRINGE INTRAVENOUS PRN
Status: DISCONTINUED | OUTPATIENT
Start: 2022-02-21 | End: 2022-02-21 | Stop reason: SDUPTHER

## 2022-02-21 RX ORDER — TRANEXAMIC ACID 100 MG/ML
1000 INJECTION, SOLUTION INTRAVENOUS
Status: ACTIVE | OUTPATIENT
Start: 2022-02-21 | End: 2022-02-21

## 2022-02-21 RX ORDER — ROCURONIUM BROMIDE 10 MG/ML
INJECTION, SOLUTION INTRAVENOUS PRN
Status: DISCONTINUED | OUTPATIENT
Start: 2022-02-21 | End: 2022-02-21 | Stop reason: SDUPTHER

## 2022-02-21 RX ORDER — SODIUM CHLORIDE, SODIUM LACTATE, POTASSIUM CHLORIDE, CALCIUM CHLORIDE 600; 310; 30; 20 MG/100ML; MG/100ML; MG/100ML; MG/100ML
INJECTION, SOLUTION INTRAVENOUS CONTINUOUS
Status: DISCONTINUED | OUTPATIENT
Start: 2022-02-21 | End: 2022-02-22 | Stop reason: HOSPADM

## 2022-02-21 RX ORDER — OXYCODONE HYDROCHLORIDE AND ACETAMINOPHEN 5; 325 MG/1; MG/1
1 TABLET ORAL EVERY 4 HOURS PRN
Status: DISCONTINUED | OUTPATIENT
Start: 2022-02-21 | End: 2022-02-22 | Stop reason: HOSPADM

## 2022-02-21 RX ORDER — LIDOCAINE HYDROCHLORIDE 20 MG/ML
INJECTION, SOLUTION EPIDURAL; INFILTRATION; INTRACAUDAL; PERINEURAL PRN
Status: DISCONTINUED | OUTPATIENT
Start: 2022-02-21 | End: 2022-02-21 | Stop reason: SDUPTHER

## 2022-02-21 RX ORDER — DEXAMETHASONE SODIUM PHOSPHATE 4 MG/ML
INJECTION, SOLUTION INTRA-ARTICULAR; INTRALESIONAL; INTRAMUSCULAR; INTRAVENOUS; SOFT TISSUE PRN
Status: DISCONTINUED | OUTPATIENT
Start: 2022-02-21 | End: 2022-02-21 | Stop reason: SDUPTHER

## 2022-02-21 RX ORDER — BUPIVACAINE HYDROCHLORIDE AND EPINEPHRINE 5; 5 MG/ML; UG/ML
30 INJECTION, SOLUTION PERINEURAL ONCE
Status: DISCONTINUED | OUTPATIENT
Start: 2022-02-21 | End: 2022-02-22 | Stop reason: HOSPADM

## 2022-02-21 RX ORDER — SODIUM CHLORIDE 0.9 % (FLUSH) 0.9 %
10 SYRINGE (ML) INJECTION EVERY 12 HOURS SCHEDULED
Status: DISCONTINUED | OUTPATIENT
Start: 2022-02-21 | End: 2022-02-22 | Stop reason: HOSPADM

## 2022-02-21 RX ORDER — ROPIVACAINE HYDROCHLORIDE 5 MG/ML
INJECTION, SOLUTION EPIDURAL; INFILTRATION; PERINEURAL
Status: COMPLETED | OUTPATIENT
Start: 2022-02-21 | End: 2022-02-21

## 2022-02-21 RX ORDER — FENTANYL CITRATE 50 UG/ML
INJECTION, SOLUTION INTRAMUSCULAR; INTRAVENOUS PRN
Status: DISCONTINUED | OUTPATIENT
Start: 2022-02-21 | End: 2022-02-21 | Stop reason: SDUPTHER

## 2022-02-21 RX ORDER — ROPINIROLE 0.25 MG/1
0.5 TABLET, FILM COATED ORAL NIGHTLY
Status: DISCONTINUED | OUTPATIENT
Start: 2022-02-21 | End: 2022-02-22 | Stop reason: HOSPADM

## 2022-02-21 RX ORDER — ONDANSETRON 2 MG/ML
4 INJECTION INTRAMUSCULAR; INTRAVENOUS EVERY 6 HOURS PRN
Status: DISCONTINUED | OUTPATIENT
Start: 2022-02-21 | End: 2022-02-22 | Stop reason: HOSPADM

## 2022-02-21 RX ADMIN — ROCURONIUM BROMIDE 10 MG: 50 INJECTION, SOLUTION INTRAVENOUS at 11:38

## 2022-02-21 RX ADMIN — CEFAZOLIN SODIUM 2000 MG: 10 INJECTION, POWDER, FOR SOLUTION INTRAVENOUS at 10:22

## 2022-02-21 RX ADMIN — HYDROMORPHONE HYDROCHLORIDE 1 MG: 2 INJECTION, SOLUTION INTRAMUSCULAR; INTRAVENOUS; SUBCUTANEOUS at 12:40

## 2022-02-21 RX ADMIN — ROPINIROLE 0.5 MG: 0.25 TABLET ORAL at 22:06

## 2022-02-21 RX ADMIN — FENTANYL CITRATE 25 MCG: 50 INJECTION, SOLUTION INTRAMUSCULAR; INTRAVENOUS at 11:33

## 2022-02-21 RX ADMIN — FENTANYL CITRATE 50 MCG: 50 INJECTION, SOLUTION INTRAMUSCULAR; INTRAVENOUS at 10:09

## 2022-02-21 RX ADMIN — Medication 1000 UNITS: at 17:44

## 2022-02-21 RX ADMIN — ROCURONIUM BROMIDE 10 MG: 50 INJECTION, SOLUTION INTRAVENOUS at 11:08

## 2022-02-21 RX ADMIN — SUGAMMADEX 200 MG: 100 INJECTION, SOLUTION INTRAVENOUS at 12:40

## 2022-02-21 RX ADMIN — ROCURONIUM BROMIDE 20 MG: 50 INJECTION, SOLUTION INTRAVENOUS at 10:49

## 2022-02-21 RX ADMIN — FENTANYL CITRATE 50 MCG: 50 INJECTION, SOLUTION INTRAMUSCULAR; INTRAVENOUS at 13:30

## 2022-02-21 RX ADMIN — LEVOTHYROXINE SODIUM 50 MCG: 0.03 TABLET ORAL at 22:07

## 2022-02-21 RX ADMIN — OXYCODONE AND ACETAMINOPHEN 2 TABLET: 5; 325 TABLET ORAL at 18:21

## 2022-02-21 RX ADMIN — ROPIVACAINE HYDROCHLORIDE 30 ML: 5 INJECTION, SOLUTION EPIDURAL; INFILTRATION; PERINEURAL at 09:41

## 2022-02-21 RX ADMIN — FLUOXETINE 40 MG: 20 CAPSULE ORAL at 17:44

## 2022-02-21 RX ADMIN — DEXAMETHASONE SODIUM PHOSPHATE 8 MG: 4 INJECTION, SOLUTION INTRAMUSCULAR; INTRAVENOUS at 10:19

## 2022-02-21 RX ADMIN — FENTANYL CITRATE 50 MCG: 50 INJECTION, SOLUTION INTRAMUSCULAR; INTRAVENOUS at 11:20

## 2022-02-21 RX ADMIN — SODIUM CHLORIDE, POTASSIUM CHLORIDE, SODIUM LACTATE AND CALCIUM CHLORIDE: 600; 310; 30; 20 INJECTION, SOLUTION INTRAVENOUS at 10:22

## 2022-02-21 RX ADMIN — CEFAZOLIN SODIUM 2000 MG: 10 INJECTION, POWDER, FOR SOLUTION INTRAVENOUS at 17:43

## 2022-02-21 RX ADMIN — FENTANYL CITRATE 25 MCG: 50 INJECTION, SOLUTION INTRAMUSCULAR; INTRAVENOUS at 11:56

## 2022-02-21 RX ADMIN — Medication 100 MCG: at 10:33

## 2022-02-21 RX ADMIN — LIDOCAINE HYDROCHLORIDE 60 MG: 20 INJECTION, SOLUTION EPIDURAL; INFILTRATION; INTRACAUDAL at 10:12

## 2022-02-21 RX ADMIN — SODIUM CHLORIDE, POTASSIUM CHLORIDE, SODIUM LACTATE AND CALCIUM CHLORIDE: 600; 310; 30; 20 INJECTION, SOLUTION INTRAVENOUS at 14:06

## 2022-02-21 RX ADMIN — LIDOCAINE HYDROCHLORIDE 4 ML: 40 SOLUTION TOPICAL at 10:16

## 2022-02-21 RX ADMIN — SODIUM CHLORIDE, POTASSIUM CHLORIDE, SODIUM LACTATE AND CALCIUM CHLORIDE: 600; 310; 30; 20 INJECTION, SOLUTION INTRAVENOUS at 09:30

## 2022-02-21 RX ADMIN — MIDAZOLAM 2 MG: 1 INJECTION INTRAMUSCULAR; INTRAVENOUS at 09:34

## 2022-02-21 RX ADMIN — PROPOFOL 150 MG: 10 INJECTION, EMULSION INTRAVENOUS at 10:12

## 2022-02-21 RX ADMIN — ONDANSETRON 4 MG: 2 INJECTION INTRAMUSCULAR; INTRAVENOUS at 12:20

## 2022-02-21 RX ADMIN — SODIUM CHLORIDE, POTASSIUM CHLORIDE, SODIUM LACTATE AND CALCIUM CHLORIDE: 600; 310; 30; 20 INJECTION, SOLUTION INTRAVENOUS at 08:04

## 2022-02-21 RX ADMIN — ASPIRIN 325 MG: 325 TABLET, COATED ORAL at 17:43

## 2022-02-21 RX ADMIN — Medication 100 MCG: at 10:25

## 2022-02-21 RX ADMIN — Medication 200 MCG: at 12:20

## 2022-02-21 RX ADMIN — GABAPENTIN 300 MG: 300 CAPSULE ORAL at 22:06

## 2022-02-21 RX ADMIN — ZOLPIDEM TARTRATE 10 MG: 5 TABLET ORAL at 22:07

## 2022-02-21 RX ADMIN — ACETAMINOPHEN 650 MG: 325 TABLET ORAL at 22:06

## 2022-02-21 RX ADMIN — ROCURONIUM BROMIDE 50 MG: 50 INJECTION, SOLUTION INTRAVENOUS at 10:13

## 2022-02-21 RX ADMIN — FENTANYL CITRATE 50 MCG: 50 INJECTION, SOLUTION INTRAMUSCULAR; INTRAVENOUS at 10:49

## 2022-02-21 RX ADMIN — ROCURONIUM BROMIDE 10 MG: 50 INJECTION, SOLUTION INTRAVENOUS at 11:52

## 2022-02-21 ASSESSMENT — PULMONARY FUNCTION TESTS
PIF_VALUE: 34
PIF_VALUE: 38
PIF_VALUE: 38
PIF_VALUE: 0
PIF_VALUE: 38
PIF_VALUE: 34
PIF_VALUE: 38
PIF_VALUE: 40
PIF_VALUE: 35
PIF_VALUE: 14
PIF_VALUE: 32
PIF_VALUE: 38
PIF_VALUE: 2
PIF_VALUE: 3
PIF_VALUE: 37
PIF_VALUE: 36
PIF_VALUE: 35
PIF_VALUE: 34
PIF_VALUE: 35
PIF_VALUE: 34
PIF_VALUE: 32
PIF_VALUE: 18
PIF_VALUE: 32
PIF_VALUE: 37
PIF_VALUE: 37
PIF_VALUE: 39
PIF_VALUE: 1
PIF_VALUE: 32
PIF_VALUE: 13
PIF_VALUE: 28
PIF_VALUE: 34
PIF_VALUE: 35
PIF_VALUE: 39
PIF_VALUE: 33
PIF_VALUE: 34
PIF_VALUE: 33
PIF_VALUE: 35
PIF_VALUE: 32
PIF_VALUE: 14
PIF_VALUE: 35
PIF_VALUE: 37
PIF_VALUE: 35
PIF_VALUE: 32
PIF_VALUE: 39
PIF_VALUE: 32
PIF_VALUE: 13
PIF_VALUE: 35
PIF_VALUE: 5
PIF_VALUE: 32
PIF_VALUE: 28
PIF_VALUE: 37
PIF_VALUE: 28
PIF_VALUE: 33
PIF_VALUE: 6
PIF_VALUE: 38
PIF_VALUE: 41
PIF_VALUE: 36
PIF_VALUE: 38
PIF_VALUE: 12
PIF_VALUE: 17
PIF_VALUE: 38
PIF_VALUE: 13
PIF_VALUE: 35
PIF_VALUE: 37
PIF_VALUE: 27
PIF_VALUE: 30
PIF_VALUE: 28
PIF_VALUE: 13
PIF_VALUE: 33
PIF_VALUE: 35
PIF_VALUE: 4
PIF_VALUE: 40
PIF_VALUE: 32
PIF_VALUE: 35
PIF_VALUE: 33
PIF_VALUE: 35
PIF_VALUE: 34
PIF_VALUE: 13
PIF_VALUE: 28
PIF_VALUE: 20
PIF_VALUE: 36
PIF_VALUE: 33
PIF_VALUE: 38
PIF_VALUE: 32
PIF_VALUE: 34
PIF_VALUE: 37
PIF_VALUE: 18
PIF_VALUE: 34
PIF_VALUE: 32
PIF_VALUE: 33
PIF_VALUE: 34
PIF_VALUE: 13
PIF_VALUE: 7
PIF_VALUE: 35
PIF_VALUE: 28
PIF_VALUE: 32
PIF_VALUE: 28
PIF_VALUE: 35
PIF_VALUE: 1
PIF_VALUE: 0
PIF_VALUE: 1
PIF_VALUE: 33
PIF_VALUE: 35
PIF_VALUE: 33
PIF_VALUE: 1
PIF_VALUE: 0
PIF_VALUE: 32
PIF_VALUE: 35
PIF_VALUE: 35
PIF_VALUE: 32
PIF_VALUE: 38
PIF_VALUE: 33
PIF_VALUE: 33
PIF_VALUE: 34
PIF_VALUE: 32
PIF_VALUE: 32
PIF_VALUE: 39
PIF_VALUE: 32
PIF_VALUE: 39
PIF_VALUE: 33
PIF_VALUE: 13
PIF_VALUE: 27
PIF_VALUE: 14
PIF_VALUE: 38
PIF_VALUE: 28
PIF_VALUE: 13
PIF_VALUE: 37
PIF_VALUE: 28
PIF_VALUE: 14
PIF_VALUE: 1
PIF_VALUE: 35
PIF_VALUE: 35
PIF_VALUE: 38
PIF_VALUE: 32
PIF_VALUE: 14
PIF_VALUE: 13
PIF_VALUE: 13
PIF_VALUE: 2
PIF_VALUE: 37
PIF_VALUE: 39
PIF_VALUE: 35
PIF_VALUE: 32
PIF_VALUE: 38
PIF_VALUE: 32
PIF_VALUE: 38
PIF_VALUE: 33
PIF_VALUE: 35
PIF_VALUE: 17
PIF_VALUE: 38
PIF_VALUE: 35
PIF_VALUE: 12
PIF_VALUE: 34
PIF_VALUE: 28
PIF_VALUE: 32
PIF_VALUE: 32
PIF_VALUE: 36
PIF_VALUE: 27
PIF_VALUE: 34
PIF_VALUE: 38
PIF_VALUE: 37
PIF_VALUE: 33
PIF_VALUE: 34
PIF_VALUE: 34
PIF_VALUE: 41
PIF_VALUE: 38
PIF_VALUE: 38
PIF_VALUE: 34
PIF_VALUE: 36
PIF_VALUE: 28
PIF_VALUE: 32
PIF_VALUE: 35
PIF_VALUE: 32
PIF_VALUE: 34
PIF_VALUE: 38
PIF_VALUE: 37
PIF_VALUE: 40
PIF_VALUE: 34
PIF_VALUE: 33
PIF_VALUE: 13
PIF_VALUE: 37
PIF_VALUE: 33
PIF_VALUE: 38

## 2022-02-21 ASSESSMENT — PAIN DESCRIPTION - LOCATION
LOCATION: KNEE
LOCATION: KNEE

## 2022-02-21 ASSESSMENT — PAIN DESCRIPTION - DESCRIPTORS
DESCRIPTORS: ACHING
DESCRIPTORS: ACHING
DESCRIPTORS: ACHING;SORE

## 2022-02-21 ASSESSMENT — PAIN DESCRIPTION - PAIN TYPE
TYPE: SURGICAL PAIN

## 2022-02-21 ASSESSMENT — PAIN DESCRIPTION - ONSET
ONSET: ON-GOING
ONSET: ON-GOING

## 2022-02-21 ASSESSMENT — PAIN SCALES - GENERAL
PAINLEVEL_OUTOF10: 8
PAINLEVEL_OUTOF10: 3
PAINLEVEL_OUTOF10: 4
PAINLEVEL_OUTOF10: 5
PAINLEVEL_OUTOF10: 4

## 2022-02-21 ASSESSMENT — PAIN DESCRIPTION - PROGRESSION
CLINICAL_PROGRESSION: GRADUALLY WORSENING

## 2022-02-21 ASSESSMENT — PAIN DESCRIPTION - ORIENTATION
ORIENTATION: RIGHT
ORIENTATION: RIGHT

## 2022-02-21 ASSESSMENT — PAIN - FUNCTIONAL ASSESSMENT: PAIN_FUNCTIONAL_ASSESSMENT: 0-10

## 2022-02-21 ASSESSMENT — ENCOUNTER SYMPTOMS: SHORTNESS OF BREATH: 1

## 2022-02-21 ASSESSMENT — LIFESTYLE VARIABLES: SMOKING_STATUS: 1

## 2022-02-21 ASSESSMENT — PAIN DESCRIPTION - FREQUENCY
FREQUENCY: CONTINUOUS
FREQUENCY: CONTINUOUS

## 2022-02-21 NOTE — ANESTHESIA PROCEDURE NOTES
Peripheral Block    Patient location during procedure: pre-op  Start time: 2/21/2022 9:35 AM  End time: 2/21/2022 9:41 AM  Staffing  Performed: anesthesiologist   Anesthesiologist: Ryan Christianson DO  Preanesthetic Checklist  Completed: patient identified, IV checked, site marked, risks and benefits discussed, surgical consent, monitors and equipment checked, pre-op evaluation, timeout performed, anesthesia consent given, oxygen available and patient being monitored  Peripheral Block  Patient position: supine  Prep: ChloraPrep  Patient monitoring: cardiac monitor, continuous pulse ox, frequent blood pressure checks and IV access  Block type: Femoral  Laterality: right  Injection technique: single-shot  Guidance: ultrasound guided  Local infiltration: lidocaine  Infiltration strength: 1 %  Dose: 3 mL  Adductor canal  Provider prep: mask and sterile gloves  Local infiltration: lidocaine  Needle  Needle type: combined needle/nerve stimulator   Needle gauge: 21 G  Needle length: 10 cm  Needle localization: ultrasound guidance  Assessment  Injection assessment: negative aspiration for heme, no paresthesia on injection and local visualized surrounding nerve on ultrasound  Paresthesia pain: none  Slow fractionated injection: yes  Hemodynamics: stable  Additional Notes  U/S [id number].  (1) Under ultrasound guidance, a 21 gauge needle was inserted and placed in close proximity to the adductor canal.  (2) Ultrasound was also used to visualize the spread of the anesthetic in close proximity to the nerve being blocked. (3) The nerve appeared anatomically normal, and (4 there were no apparent abnormal pathological findings on the image that were readily visible and related to the nerve being blocked. (5) A permanent ultrasound image was saved in the patient's record.           Medications Administered  Ropivacaine (NAROPIN) injection 0.5%, 30 mL  Reason for block: post-op pain management and at surgeon's request

## 2022-02-21 NOTE — ANESTHESIA POSTPROCEDURE EVALUATION
Department of Anesthesiology  Postprocedure Note    Patient: Dottie Lam  MRN: [id number]  YOB: 1970  Date of evaluation: 2/21/2022  Time:  1:17 PM     Procedure Summary     Date: 02/21/22 Room / Location: [address]    Anesthesia Start: 0957 Anesthesia Stop: 8684    Procedure: RIGHT KNEE TOTAL ARTHROPLASTY ROBOTIC (Right Knee) Diagnosis:       Primary osteoarthritis of right knee      Right knee pain, unspecified chronicity      (Primary osteoarthritis of right knee [M17.11] Right knee pain, unspecified chronicity [M25.561])    Surgeons: Regis Boyd MD Responsible Provider: Kesha Phillips DO    Anesthesia Type: general, regional ASA Status: 3          Anesthesia Type: general, regional    Amanda Phase I: Amanda Score: 4    Amanda Phase II:      Last vitals: Reviewed and per EMR flowsheets.        Anesthesia Post Evaluation    Patient location during evaluation: PACU  Patient participation: complete - patient participated  Level of consciousness: sleepy but conscious  Airway patency: patent  Nausea & Vomiting: no nausea  Complications: no  Cardiovascular status: hemodynamically stable  Respiratory status: acceptable  Hydration status: euvolemic

## 2022-02-21 NOTE — H&P
Consult note      Name:  Merla Boast /Age/Sex: 1970  (45 y.o. female)   MRN & CSN:  [id number] & [id number] Encounter Date/Time: 2022 3:41 PM EST   Location:  [place] [address] PCP: Kayla Dobson MD       Hospital Day: 1    Assessment and Plan:   Medical decision making:   Right knee pain s/p total knee arthroplasty  o POD #0  o Surgery performed by Dr. Jose Felix, no complications documented in surgical report  o Pain and nausea control PRN  o Prophylactic DVT prophylaxis with lovenox when ok per surgery      Obesity BMI 46   Tobacco abuse  o Smokes 1ppd  o Nicorette gum and patch PRN    Chronic conditions: home medications continued unless contraindicated   Anxiety - continue prozac   Insomnia - continue ambien nightly   COPD - continue home spiriva   Hypothyroidism - continue home synthroid   TIA   ROXI   RLS - continue requip   Cervical radiculopathy - continue gabapentin/flexeril   Hx of hypoglycemia - check BS PRN if patient feels symptomatic       Disposition:   Current Living situation: home  Expected Disposition: home when cleared per surgery  Estimated D/C: tomorrow  Diet ADULT DIET; Regular; 4 carb choices (60 gm/meal)   DVT Prophylaxis [] Lovenox, []  Heparin, [] SCDs, [] Ambulation,  [] Eliquis, [] Xarelto   Code Status Full Code   Surrogate Decision Maker/ POA child     History from:   Patient   History of Present Illness:   Chief Complaint: knee pain  Merla Boast is a 45 y.o. female with pmh of anxiety, COPD, TIA who presents to [facility] for scheduled right total knee replacement. Hospitalist group was consulted for medical management post op. She reports walking with therapy and improvement in her pain. Admits to 1ppd of cigarettes, denies other drug or alcohol use. Patient denies chest pain, dyspnea, fever, chills, cough, congestion, abdominal pain, dysuria, hematuria, headaches, or nausea.      Patient's past medical, social, and family history have been reviewed. Review of Systems:    10 point system reviewed, negative, unless as noted in above HPI. Objective: Intake/Output Summary (Last 24 hours) at 2/21/2022 1541  Last data filed at 2/21/2022 1500  Gross per 24 hour   Intake 1800 ml   Output 250 ml   Net 1550 ml      Vitals:   Vitals:    02/21/22 1430 02/21/22 1445 02/21/22 1500 02/21/22 1501   BP: (!) 126/51 (!) 124/54 (!) 116/54    Pulse: 84 87 86 83   Resp: 16 16 16    Temp:   97.4 °F (36.3 °C)    TempSrc:   Temporal    SpO2: 98% 96% 96%    Weight:       Height:         Medications Prior to Admission     Prior to Admission medications    Medication Sig Start Date End Date Taking? Authorizing Provider   gabapentin (NEURONTIN) 300 MG capsule 300 mg at bedtime. 1/12/22  Yes Historical Provider, MD   cyclobenzaprine (FLEXERIL) 10 MG tablet  1/12/22  Yes Historical Provider, MD   FLUoxetine (PROZAC) 40 MG capsule at bedtime  12/28/21  Yes Historical Provider, MD   zolpidem (AMBIEN) 10 MG tablet Take 10 mg by mouth nightly. Yes Historical Provider, MD   rOPINIRole (REQUIP) 0.25 MG tablet TAKE 3 TABLETS BY MOUTH 2 HOURS BEFORE BED 4/8/21  Yes Lorenzo Kidd MD   traMADol (ULTRAM) 50 MG tablet Take 50 mg by mouth every 12 hours.   5/14/20  Yes Historical Provider, MD Monk Teja 2.5 MCG/ACT AERS inhaler  11/22/19  Yes Historical Provider, MD   Cholecalciferol (VITAMIN D3) 25 MCG (1000 UT) TABS  10/11/19  Yes Historical Provider, MD   levothyroxine (SYNTHROID) 50 MCG tablet at bedtime  10/17/19  Yes Historical Provider, MD   meloxicam (MOBIC) 15 MG tablet  10/11/19  Yes Historical Provider, MD Wing Vaughn strip test 2 - 3 times a day by Transdermal route as directed 12/28/21   Historical Provider, MD   OneTouch Delica Lancets 43I MISC test UP  [facility] [address] 12/28/21   Historical Provider, MD   Blood Glucose Monitoring Suppl ([address]) w/Device KIT USE AS DIRECTED 10/27/21   Historical Provider, MD   Alcohol Swabs ( Smoking status: Current Every Day Smoker     Packs/day: 1.00     Years: 30.00     Pack years: 30.00     Types: Cigarettes    Smokeless tobacco: Never Used   Vaping Use    Vaping Use: Never used   Substance and Sexual Activity    Alcohol use: No     Comment: occasional    Drug use: Never    Sexual activity: Not Currently   Other Topics Concern    None   Social History Narrative    None     Social Determinants of Health     Financial Resource Strain:     Difficulty of Paying Living Expenses: Not on file   Food Insecurity:     Worried About Running Out of Food in the Last Year: Not on file    Fabiana of Food in the Last Year: Not on file   Transportation Needs:     Lack of Transportation (Medical): Not on file    Lack of Transportation (Non-Medical):  Not on file   Physical Activity:     Days of Exercise per Week: Not on file    Minutes of Exercise per Session: Not on file   Stress:     Feeling of Stress : Not on file   Social Connections:     Frequency of Communication with Friends and Family: Not on file    Frequency of Social Gatherings with Friends and Family: Not on file    Attends [employer] Services: Not on file    Active Member of [address] [place] [address] or Organizations: Not on file    Attends Club or Organization Meetings: Not on file    Marital Status: Not on file   Intimate Partner Violence:     Fear of Current or Ex-Partner: Not on file    Emotionally Abused: Not on file    Physically Abused: Not on file    Sexually Abused: Not on file   Housing Stability:     Unable to Pay for Housing in the Last Year: Not on file    Number of [place] in the Last Year: Not on file    Unstable Housing in the Last Year: Not on file     Medications:   Medications:    bupivacaine-EPINEPHrine  30 mL IntraDERmal Once    tranexamic acid  1,000 mg IntraVENous On Call to OR    Vitamin D  1,000 Units Oral Daily    FLUoxetine  40 mg Oral Daily    gabapentin  300 mg Oral Nightly    levothyroxine  50 mcg Oral Nightly    rOPINIRole  0.5 mg Oral Nightly    tiotropium  2 puff Inhalation Daily    zolpidem  10 mg Oral Nightly    sodium chloride flush  10 mL IntraVENous 2 times per day    acetaminophen  650 mg Oral Q6H    ceFAZolin (ANCEF) IVPB  2,000 mg IntraVENous Q8H    aspirin  325 mg Oral BID      Infusions:    lactated ringers 75 mL/hr at 02/21/22 1406    sodium chloride       PRN Meds: cyclobenzaprine, 10 mg, BID PRN  sodium chloride flush, 10 mL, PRN  sodium chloride, 25 mL, PRN  HYDROmorphone, 0.25 mg, Q3H PRN   Or  HYDROmorphone, 0.5 mg, Q3H PRN  bisacodyl, 5 mg, Daily PRN  ondansetron, 4 mg, Q8H PRN   Or  ondansetron, 4 mg, Q6H PRN  oxyCODONE-acetaminophen, 1 tablet, Q4H PRN  oxyCODONE-acetaminophen, 2 tablet, Q4H PRN      Labs    XR CHEST (2 VW)    Result Date: 2/16/2022  EXAMINATION: TWO X-RAY VIEWS OF THE CHEST 2/16/2022 10:24 am COMPARISON: June 22, 2015 HISTORY: ORDERING SYSTEM PROVIDED HISTORY: Encounter for preadmission testing TECHNOLOGIST PROVIDED HISTORY: Pre-op testing- Hx of COPD. surgery 2/21/22. PA & Lateral views Reason for exam:->Pre-op testing- Hx of COPD. surgery 2/21/22. PA & Lateral views Reason for Exam: Pre-op testing- Hx of COPD FINDINGS: The cardiomediastinal silhouette is normal in size. The lungs are clear. No pleural effusion or pneumothorax is identified. No evidence of acute cardiopulmonary process.      NM MYOCARDIAL SPECT REST EXERCISE OR RX    Result Date: 2/18/2022  Cardiac Perfusion Imaging   Demographics   Patient Name      Maritza [facility] Date of study        02/18/2022   Date of Birth     1970         Gender               Female   Age               46 year(s)         Race                    Patient Number    [id number]           Room Number   Visit Number      [id number]          Height               63 inches   Corporate ID      [id number]           Weight               261 pounds   Accession Number  [id number]                                        NM Technologist      Bethann Phalen, [facility]   Ordering          Greer Moore Azeal Zavala  Physician         MD                 Cardiologist         Tere Wakefield MD   The procedure was explained in detail to the patient. Risks,  complications and alternative treatments were reviewed. Written consent  was obtained. Conclusions   Summary  Supervising physician Dr. Latosha Barry . Normal LV function, normal EDV  There is normal isotope uptake following exercise and at rest. There is no  evidence of exercise induced ischemia. This is a normal study. Recommendation  cleared for surgery from cardiac standpoint on low to moderate  cardiovascular risk   Signatures   ------------------------------------------------------------------  Electronically signed by Eloina Romano MD  (Interpreting cardiologist) on 02/18/2022 at 14:34  ------------------------------------------------------------------  Procedure Procedure Type:   Nuclear Stress Test:NM MYOCARDIAL SPECT REST EXERCISE OR RX  Indications: Pre-op, abnormal rest ECG and Chest pain. Risk Factors   The patient risk factors include:Current - Every day tobacco use and chronic  lung disease. Stress Protocols   Resting ECG  nsr   Resting HR:78 bpm  Resting BP:122/84 mmHg  Stress Protocol:Pharmacologic - Lexiscan  Peak HR:100 bpm                          HR response: Appropriate  Peak BP:104/58 mmHg                      BP response: Normal resting BP -  Predicted HR: 167 bpm                    appropriate response  % of predicted HR: 60                    HR/BP product:[id number]   Exercise duration: 01:01 min  Reason for termination:Reached  diagnostic endpoint   ECG Findings  nsr   Stress Interpretation  ECG portion of stress test is negative for ischemia by diagnostic criteria.   Procedure Medications   - Lexiscan I.V. bolus (over 15sec.) 0.4 mg admininstered @ 02/18/2022 12:30. Imaging Protocols   Rest                                Stress   Isotope:Sestamibi 99mTc             Isotope: Sestamibi 99mTc  Isotope dose:9.2 mCi                Isotope dose:27.1 mCi  Administration route: I.V. Lt. arm  Administration route: I.V. Lt. arm  Injection Date:02/18/2022 11:30     Injection Date:02/18/2022 12:31  Scan Date:02/18/2022 12:00          Scan Date:02/18/2022 13:00   Technique:         SPECT            Technique:          Gated                                                          SPECT   Perfusion Interpretation   Normal tracer uptake in all myocardial segment during rest and stress. Imaging Results Visualization: Good  Rest ejection  Ejection fraction:73 %  EDV :82 ml  ESV :22 ml  Stroke volume :60 ml  Medical History   Accession#:  [id number]  Admission Data Admission date: 02/18/2022 Admission Time: [address] Status: Outpatient. CBC: No results for input(s): WBC, HGB, PLT in the last 72 hours. BMP:  No results for input(s): NA, K, CL, CO2, BUN, CREATININE, GLUCOSE in the last 72 hours. Hepatic: No results for input(s): AST, ALT, ALB, BILITOT, ALKPHOS in the last 72 hours. Lipids: No results found for: CHOL, HDL, TRIG  Hemoglobin A1C: No results found for: LABA1C  TSH: No results found for: TSH  Troponin: No results found for: TROPONINT  Lactic Acid: No results for input(s): LACTA in the last 72 hours. BNP: No results for input(s): PROBNP in the last 72 hours.   UA:  Lab Results   Component Value Date    NITRU NEGATIVE 02/16/2022    NITRU NEGATIVE 06/08/2013    COLORU YELLOW 02/16/2022    WBCUA 1 02/16/2022    RBCUA <1 02/16/2022    MUCUS RARE 02/16/2022    TRICHOMONAS NONE SEEN 05/29/2018    BACTERIA NEGATIVE 02/16/2022    CLARITYU CLEAR 02/16/2022    SPECGRAV >1.030 02/16/2022    LEUKOCYTESUR NEGATIVE 02/16/2022    UROBILINOGEN 1.0 EU 02/16/2022    BILIRUBINUR NEGATIVE 02/16/2022    BLOODU NEGATIVE 02/16/2022    GLUCOSEU neg 06/08/2013    GLUCOSEU Dr. Alondra Rodas . Normal LV function, normal EDV  There is normal isotope uptake following exercise and at rest. There is no  evidence of exercise induced ischemia. This is a normal study. Recommendation  cleared for surgery from cardiac standpoint on low to moderate  cardiovascular risk   Signatures   ------------------------------------------------------------------  Electronically signed by Loida Yuan MD  (Interpreting cardiologist) on 02/18/2022 at 14:34  ------------------------------------------------------------------  Procedure Procedure Type:   Nuclear Stress Test:NM MYOCARDIAL SPECT REST EXERCISE OR RX  Indications: Pre-op, abnormal rest ECG and Chest pain. Risk Factors   The patient risk factors include:Current - Every day tobacco use and chronic  lung disease. Stress Protocols   Resting ECG  nsr   Resting HR:78 bpm  Resting BP:122/84 mmHg  Stress Protocol:Pharmacologic - Lexiscan  Peak HR:100 bpm                          HR response: Appropriate  Peak BP:104/58 mmHg                      BP response: Normal resting BP -  Predicted HR: 167 bpm                    appropriate response  % of predicted HR: 60                    HR/BP product:[id number]   Exercise duration: 01:01 min  Reason for termination:Reached  diagnostic endpoint   ECG Findings  nsr   Stress Interpretation  ECG portion of stress test is negative for ischemia by diagnostic criteria. Procedure Medications   - Lexiscan I.V. bolus (over 15sec.) 0.4 mg admininstered @ 02/18/2022 12:30.   Imaging Protocols   Rest                                Stress   Isotope:Sestamibi 99mTc             Isotope: Sestamibi 99mTc  Isotope dose:9.2 mCi                Isotope dose:27.1 mCi  Administration route: I.V. Lt. arm  Administration route: I.V. Lt. arm  Injection Date:02/18/2022 11:30     Injection Date:02/18/2022 12:31  Scan Date:02/18/2022 12:00          Scan Date:02/18/2022 13:00   Technique:         SPECT            Technique:

## 2022-02-21 NOTE — PROGRESS NOTES
Patient arrived to PACU for preprocedural block. Monitors applied, alarms on. Bed low position, call light in reach. Patient awake, alert and oriented. Report obtained from [facility] from [facility]. Dr. Shaheen Shine at bedside.

## 2022-02-21 NOTE — PROGRESS NOTES
1510- Report given to this nurse from [facility] PACU, Patient is A&O able to make needs known beverage of choice given to patient, IS education given to  patient verbal understanding patient also  demonstrates understanding. Dressing to knee C/D/I pulses present no c/o pain at this time.

## 2022-02-21 NOTE — ANESTHESIA PRE PROCEDURE
Department of Anesthesiology  Preprocedure Note       Name:  Danette Perdomo   Age:  45 y.o.  :  1970                                          MRN:  [id number]         Date:  2022      Surgeon: Huang Stevenson):  Adeola Barriga MD    Procedure: Procedure(s):  RIGHT KNEE TOTAL ARTHROPLASTY ROBOTIC    Medications prior to admission:   Prior to Admission medications    Medication Sig Start Date End Date Taking? Authorizing Provider   gabapentin (NEURONTIN) 300 MG capsule 300 mg at bedtime. 22  Yes Historical Provider, MD   cyclobenzaprine (FLEXERIL) 10 MG tablet  22  Yes Historical Provider, MD   FLUoxetine (PROZAC) 40 MG capsule at bedtime  21  Yes Historical Provider, MD   zolpidem (AMBIEN) 10 MG tablet Take 10 mg by mouth nightly. Yes Historical Provider, MD   rOPINIRole (REQUIP) 0.25 MG tablet TAKE 3 TABLETS BY MOUTH 2 HOURS BEFORE BED 21  Yes Mario Aggarwal MD   traMADol (ULTRAM) 50 MG tablet Take 50 mg by mouth every 12 hours.   20  Yes Historical Provider, MD   Kayla Richland 2.5 MCG/ACT AERS inhaler  19  Yes Historical Provider, MD   Cholecalciferol (VITAMIN D3) 25 MCG (1000 UT) TABS  10/11/19  Yes Historical Provider, MD   levothyroxine (SYNTHROID) 50 MCG tablet at bedtime  10/17/19  Yes Historical Provider, MD   meloxicam (MOBIC) 15 MG tablet  10/11/19  Yes Historical Provider, MD Hernandez Bitters strip test 2 - 3 times a day by Transdermal route as directed 21   Historical Provider, MD   OneTouch Delica Lancets 50P [address] [facility] test UP  [address] 21   Historical Provider, MD   Blood Glucose Monitoring Suppl ([address]) w/Device KIT USE AS DIRECTED 10/27/21   Historical Provider, MD   Alcohol Swabs ( STERILE ALCOHOL PREP) PADS take by Topical route every day 21   Historical Provider, MD       Current medications:    Current Facility-Administered Medications   Medication Dose Route Frequency Provider Last Rate Last Admin  ceFAZolin (ANCEF) 2000 mg in dextrose 5 % 100 mL IVPB  2,000 mg IntraVENous Once Uriel Clifton MD        bupivacaine-EPINEPHrine (MARCAINE-w/EPINEPHRINE) 0.5% -1:820176 injection 30 mL  30 mL IntraDERmal Once Uriel Clifton MD           Allergies:  No Known Allergies    Problem List:    Patient Active Problem List   Diagnosis Code    Chest pain R07.9    Left sided numbness R20.0    Bipolar 1 disorder ([address] F31.9    Tobacco abuse Z72.0    SOB (shortness of breath) R06.02    Excessive daytime sleepiness G47.19    SOB (shortness of breath) on exertion R06.02    Anemia D64.9    Restless leg syndrome G25.81    ROXI (obstructive sleep apnea) G47.33    Pre-op evaluation Z01.818       Past Medical History:        Diagnosis Date    Anxiety     COPD (chronic obstructive pulmonary disease) (HCC)     Hypoglycemia     history    Panic attacks     Separation anxiety     TIA (transient ischemic attack)        Past Surgical History:        Procedure Laterality Date     SECTION      CHOLECYSTECTOMY      HYSTERECTOMY      KNEE CARTILAGE SURGERY      R knee 2002    TUBAL LIGATION         Social History:    Social History     Tobacco Use    Smoking status: Current Every Day Smoker     Packs/day: 1.00     Years: 30.00     Pack years: 30.00     Types: Cigarettes    Smokeless tobacco: Never Used   Substance Use Topics    Alcohol use: No     Comment: occasional                                Ready to quit: Not Answered  Counseling given: Not Answered      Vital Signs (Current):   Vitals:    22 0803 22 0749 22 0753   BP:   (!) 110/54   Pulse:  88    Resp:  18    Temp:  36.6 °C (97.9 °F)    TempSrc:  Temporal    SpO2:  98%    Weight: 261 lb (118.4 kg)  261 lb (118.4 kg)   Height: 5' 3\" (1.6 m)  5' 3\" (1.6 m)                                              BP Readings from Last 3 Encounters:   22 (!) 110/54   22 130/82   21 (!) 149/63       NPO Status: Time of last liquid exam    (+) COPD:  shortness of breath:  sleep apnea:  current smoker                           Cardiovascular:  Exercise tolerance: good (>4 METS),   (+) HURTADO:,       ECG reviewed      Echocardiogram reviewed  Stress test reviewed             ROS comment: Stress test 2/18/2022:  [facility] physician Dr. Maranda Gregg .  Alverna Amend LV function, normal EDV   There is normal isotope uptake following exercise and at rest. There is no   evidence of exercise induced ischemia.   This is a normal study.      Recommendation   cleared for surgery from cardiac standpoint on low to moderate   cardiovascular risk    Echo 2/8/2022:  Summary   Limited study due to patients body habitus. Left ventricular function and size is normal, EF is estimated at 55-60%. Moderate left ventricular hypertrophy. Grade I diastolic dysfunction. No regional wall motion abnormalities were detected. No significant valvular abnormalities. No evidence of pericardial effusion. Neuro/Psych:   (+) TIA, psychiatric history:depression/anxiety             GI/Hepatic/Renal:   (+) morbid obesity          Endo/Other:    (+) Diabetes, hypothyroidism, blood dyscrasia: anemia, arthritis: OA., .                 Abdominal:   (+) obese,           Vascular: negative vascular ROS. Other Findings:             Anesthesia Plan      general and regional     ASA 3     (  Pt. Refused spinal)  Induction: intravenous. MIPS: Postoperative opioids intended and Prophylactic antiemetics administered. Anesthetic plan and risks discussed with patient. MERE Maria CRNA   2/21/2022        Pre Anesthesia Evaluation complete. Anesthesia plan, risks, benefits, alternatives, and personnel discussed with patient and/or legal guardian. Patient and/or legal guardian verbalized an understanding and agreed to proceed. Anesthesia plan discussed with care team members and agreed upon.   MERE Maria - CRNA  2/21/2022

## 2022-02-21 NOTE — CONSULTS
minimal impairment in function and endurance. · Neuro:  [facility]      Mobility:  · Rolling L/R:  supervision  · Supine to sit:  Pt educated on use of gait belt as leg  and pt able to perform bed mobility SBA with cues for sequencing  · Transfers: pt completed STS from EOB CGA with slight LOB to right requiring min A to correct. Pt then completed STS to/from commode and to chair SBA with cues for sequencing  · Sitting balance:  good. · Standing balance:  fair. · Gait: pt ambulated [de-identified]' x2 with RW with CGA progressing to SBA. Pt ambulated with decreased gertrudis, overreliance of UEs on RW, decreased stance phase on R LE, and decreased step length bilaterally. Cues provided for R leading step to gait pattern with RW throughout. [facility] 6 Clicks Inpatient Mobility:  AM-PAC Inpatient Mobility Raw Score : 18    Safety: patient left in chair, call light within reach, RN notified, gait belt used. Assessment:  Pt is a 45 y.o. female admitted to the hospital s/p R TKA. Pt is typically independent with all ambulation and transfers without a device. Pt is currently performing bed mobility SBA, transferring SBA, and ambulating 60' with RW SBA. Pt is presenting with decreased endurance, impaired balance, impaired bed mobility, impaired transfers, impaired gait, increased pain, and impaired ROm. Pt would benefit from continued acute care PT [place] [id number] [place] upon discharge to continue to address impairments. Complexity: moderate    Prognosis: Good, no significant barriers to participation at this time.      Plan Times per week: BID/week     Equipment: Pt will need RW upon discharge    Goals:  Short term goals  Time Frame for Short term goals: 1 week  Short term goal 1: Pt to complete all bed mobility mod I  Short term goal 2: Pt to complete all STS transfers to/from bed, commode, and chair mod I  Short term goal 3: Pt to ambulate 150' with LRAD mod I  Short term goal 4: Pt to ascend/descend 3 stairs with HR and SBA to simulate home set up       Treatment plan:  Bed mobility, transfers, balance, gait, TA, TX,    Recommendations for NURSING mobility: amb with gait belt and RW    Time:   Time in: 1345  Time out: 1611  Timed treatment minutes: 12  Total time: 21    Electronically signed by:    David Wall, PT  2/21/2022, 4:25 PM

## 2022-02-21 NOTE — PROGRESS NOTES
1305: Patient arrived to PACU from OR. Monitors applied, alarms on. Oral airway remains in place at this time. Surgical dressing is clean, dry and intact. Ice applied. Report obtained from ERIN GUTIERREZ, [address] [facility] and [address] [place] [address].  1773: Oral airway removed. 1400: Patient resting in bed at this time. Waiting on radiology for post op xray. 1450: Radiology at bedside for post op xray. 1510: [facility] to [facility] overflow. Report given bedside to [facility].

## 2022-02-21 NOTE — H&P
Chief Complaint   Patient presents with    Knee Pain       right knee          History of Present Illness:                             Ronny Davies is a 46 y.o. female who returns today for follow-up of her right knee. She continues to have progressive worsening pain and dysfunction in the knee despite appropriate conservative measures. She has been through 2 rounds of injections. The last injection did not provide any significant pain relief and she is not interested in any further injections at this time. She has been using anti-inflammatory medications, bracing, and home exercise program to keep the knee functional however she feels that the pain is getting worse over time. She has been unable to exercise much as she would like because of constant deep aching knee pain. She has been successful in losing some weight but has been unable to lose any additional weight at this time because of inability to be as active as she would like to be due to her knee pain.        Patient presents to the office today for FU of the right knee. Pt states last injection given on 10/21/21 did not help with the pain. Pt states pain today is a 6/10. Pt states pain is along the medial aspect of the right knee. Pt states she does take ibuprofen daily and does do stretching daily.             Medical History  Patient's medications, allergies, past medical, surgical, social and family histories were reviewed and updated as appropriate.     Past Medical History:   Diagnosis Date    Anxiety     COPD (chronic obstructive pulmonary disease) (HCC)     Hypoglycemia     history    Panic attacks     Separation anxiety     TIA (transient ischemic attack)      Past Surgical History:   Procedure Laterality Date     SECTION      CHOLECYSTECTOMY      HYSTERECTOMY      KNEE CARTILAGE SURGERY      R knee 2002    TUBAL LIGATION       Family History   Problem Relation Age of Onset    Cancer Mother     Cancer Father Social History     Socioeconomic History    Marital status:      Spouse name: None    Number of children: None    Years of education: None    Highest education level: None   Occupational History    None   Tobacco Use    Smoking status: Current Every Day Smoker     Packs/day: 1.00     Years: 30.00     Pack years: 30.00     Types: Cigarettes    Smokeless tobacco: Never Used   Vaping Use    Vaping Use: Never used   Substance and Sexual Activity    Alcohol use: No     Comment: occasional    Drug use: Never    Sexual activity: Not Currently   Other Topics Concern    None   Social History Narrative    None     Social Determinants of Health     Financial Resource Strain:     Difficulty of Paying Living Expenses: Not on file   Food Insecurity:     Worried About Running Out of Food in the Last Year: Not on file    Fabiana of Food in the Last Year: Not on file   Transportation Needs:     Lack of Transportation (Medical): Not on file    Lack of Transportation (Non-Medical):  Not on file   Physical Activity:     Days of Exercise per Week: Not on file    Minutes of Exercise per Session: Not on file   Stress:     Feeling of Stress : Not on file   Social Connections:     Frequency of Communication with Friends and Family: Not on file    Frequency of Social Gatherings with Friends and Family: Not on file    Attends Yarsanism Services: Not on file    Active Member of [address] [place] [address] [employer] or Organizations: Not on file    Attends Club or Organization Meetings: Not on file    Marital Status: Not on file   Intimate Partner Violence:     Fear of Current or Ex-Partner: Not on file    Emotionally Abused: Not on file    Physically Abused: Not on file    Sexually Abused: Not on file   Housing Stability:     Unable to Pay for Housing in the Last Year: Not on file    Number of [place] in the Last Year: Not on file    Unstable Housing in the Last Year: Not on file     Current Facility-Administered Medications Medication Dose Route Frequency Provider Last Rate Last Admin    ceFAZolin (ANCEF) 2000 mg in dextrose 5 % 100 mL IVPB  2,000 mg IntraVENous Once Alin Doherty MD        bupivacaine-EPINEPHrine (MARCAINE-w/EPINEPHRINE) 0.5% -[id number] injection 30 mL  30 mL IntraDERmal Once Alin Doherty MD        meperidine (DEMEROL) injection 12.5 mg  12.5 mg IntraVENous Q5 Min PRN Steph Primrose, DO        HYDROmorphone (DILAUDID) injection 0.5 mg  0.5 mg IntraVENous Q5 Min PRN Steph Primrose, DO        fentaNYL (SUBLIMAZE) injection 50 mcg  50 mcg IntraVENous Q5 Min PRN Steph Primrose, DO        metoclopramide (REGLAN) injection 10 mg  10 mg IntraVENous Once PRN Steph Primrose, DO        diphenhydrAMINE (BENADRYL) injection 12.5 mg  12.5 mg IntraVENous Once PRN Steph Primrose, DO        labetalol (NORMODYNE;TRANDATE) injection 10 mg  10 mg IntraVENous Q15 Min PRN Steph Primrose, DO        Or    hydrALAZINE (APRESOLINE) injection 10 mg  10 mg IntraVENous Q15 Min PRN Steph Primrose, DO         No Known Allergies      Review of Systems   Constitutional: Negative for chills and fever. HENT: Negative for congestion and sneezing. Eyes: Negative for pain and redness. Respiratory: Negative for chest tightness, shortness of breath and wheezing. Cardiovascular: Negative for chest pain and palpitations. Gastrointestinal: Negative for vomiting. Musculoskeletal: Positive for arthralgias. Skin: Negative for color change and rash. Neurological: Negative for weakness and numbness. Psychiatric/Behavioral: Negative for agitation. The patient is not nervous/anxious.                                                 Examination:  General Exam:  Vitals: BP (!) 114/57   Pulse 85   Temp 97.9 °F (36.6 °C) (Temporal)   Resp 21   Ht 5' 3\" (1.6 m)   Wt 261 lb (118.4 kg)   SpO2 99%   BMI 46.23 kg/m²    Physical Exam     Vitals and nursing note reviewed.    Constitutional:       Appearance: Normal appearance. HENT:      Head: Normocephalic and atraumatic. Eyes:      Conjunctiva/sclera: Conjunctivae normal.      Pupils: Pupils are equal, round, and reactive to light. Pulmonary:      Effort: Pulmonary effort is normal.   Musculoskeletal:      Cervical back: Normal range of motion. Right hip: No deformity, tenderness, bony tenderness or crepitus. Normal range of motion. Normal strength. Left hip: Normal.      Left knee: No swelling, deformity, effusion, ecchymosis or lacerations. Normal range of motion. No tenderness. No medial joint line or lateral joint line tenderness. No LCL laxity or MCL laxity. Normal alignment and normal meniscus. Comments: Right Lower Extremity:    There is moderate to severe tenderness to palpation diffusely throughout the knee, most significant along the medial joint line. There is a moderate knee joint effusion present and mild global swelling anteriorly. Mild restricted range of motion at the knee with approximately 5 degrees lack of full extension and knee flexion up to 120 degrees with pain at the extremes of motion. There is mild crepitation during active range of motion. There is mild varus knee alignment. There is 5 out of 5 strength with knee flexion and extension. There is no instability to varus or valgus stress testing and anterior and posterior drawer testing. Sensation is intact to light touch throughout the lower extremity. There is positive medial Elidia's test with tenderness to palpation and pain along the medial joint line. Skin is intact. Pulses are intact    No pain with active range of motion of the hip. Strength and range of motion of the hip are intact. No tenderness to palpation at the hip. Skin:     General: Skin is warm and dry. Neurological:      Mental Status: She is alert and oriented to person, place, and time.    Psychiatric:         Mood and Affect: Mood normal.         Behavior: Behavior normal.           Diagnostic testing:  X-ray images were reviewed by myself and discussed with the patient:  X-ray images of the right knee from 6/18/2021 were reviewed by myself and discussed with patient:  There is tricompartmental advanced degenerative changes most prominent medial compartment where there are prominent osteophytes and high-grade joint space narrowing. There is subchondral sclerosis and irregularities on both sides of the joint primarily on the medial compartment but also involving the patellofemoral compartment. No evidence of fracture. Moderate varus alignment present. Mild joint effusion present       Office Procedures:    Assessment and Plan  1. Right knee tricompartmental primary osteoarthritis     We discussed the severity of the degenerative findings on both on the x-rays and physical exam.  The patient feels that they have exhausted conservative measures. The patient has previously tried injections, physical therapy, over-the-counter pain medications, and activity modification. The pain level is severe and bothers the patient on a daily basis, including interrupting sleep at night. Activities of daily living are difficult to perform. The patient has trouble getting dressed, getting up from a seated position, climbing stairs, and has fear of falling.     The pain and dysfunction at the knee are severely limiting at this stage and the patient would like to consider surgical intervention.     I have recommended surgical intervention for a total knee replacement. We discussed the risks, benefits, and alternatives to surgery. We discussed the intended benefits from a well-functioning replacement and the anticipated recovery process. We discussed potential risks and complications including but not limited to DVT/PE, infection, stiffness, loosening, and fracture.   We discussed pain control, physical therapy, and anticoagulation during the perioperative timeframe.     The patient would like to proceed with knee replacement surgery and will be enrolled in the joint replacement class     Plan will be to proceed with a robotic assisted right total knee replacement. She will be on aspirin postoperatively for DVT prophylaxis     History and Physical exam note from the office visit is copied above from epic. I have reviewed the note and examined the patient and find no relevant changes.      I have reviewed with the patient and/or family the risks, benefits, and alternatives to the procedure as well as expected postoperative course    Frida Mullins MD      Electronically signed by Frida Mullins MD on 2/21/2022 at 9:09 AM

## 2022-02-21 NOTE — OP NOTE
Operative Note      Patient: Pan Mcgowan  YOB: 1970  MRN: [id number]    Date of Procedure: 2/21/2022    Pre-Op Diagnosis: Primary osteoarthritis of right knee [M17.11] Right knee pain, unspecified chronicity [M25.561]    Post-Op Diagnosis: Same       Procedure(s):  RIGHT KNEE TOTAL ARTHROPLASTY ROBOTIC    Surgeon(s):  Gino Medel MD    Assistant:   ADELAIDE Skelton. He assisted with patient positioning, retractors, placement of the implants, closure of the wounds, and application of the dressings      Anesthesia: General, regional nerve block    Estimated Blood Loss (mL): less than 329     Complications: None    Specimens:   * No specimens in log *    Implants:  Implant Name Type Inv.  Item Serial No.  Lot No. LRB No. Used Action   CEMENT BNE 40GM W/ GENT HI VISC RADPQ FOR REV SURG - [id number]  CEMENT BNE 40GM W/ GENT HI VISC RADPQ FOR REV SURG  [employer] [id number] Right 2 Implanted   IMPLANT PAT [id number] X3 ASYM TRIATHLON - [id number]  IMPLANT PAT [id number] X3 ASYM TRIATHLON  [employer] [id number] Right 1 Implanted   COMPONENT FEM SZ 3 R KNEE CRUCE RET KAEL TRIATHLON - [id number]  COMPONENT FEM SZ 3 R KNEE CRUCE RET KAEL TRIATHLON  [employer] [id number] Right 1 Implanted   BASEPLATE TIB SZ 4 KNEE TRITANIUM KAEL YADY LO PROF TRIATHLON - [id number]  BASEPLATE TIB SZ 4 KNEE TRITANIUM KAEL YADY LO PROF TRIATHLON  [employer] [id number] Right 1 Implanted   INSERT TIB SZ 4 THK9MM KNEE X3 CRUCE RET BEAR TECHNOLOGY - [id number]  INSERT TIB SZ 4 THK9MM KNEE X3 CRUCE RET BEAR TECHNOLOGY  [employer] [id number] Right 1 Implanted         Drains: * No LDAs found *    Findings:     Detailed Description of Procedure:   Implants:  Collin triathlon cemented total knee replacement size 3  CR femoral component, size 4 tibial baseplate, size 9mm CR articular surface liner, and a size 32 asymmetric patella    The patient was identified in the preoperative area and the site was marked. The patient was taken back to the operating room placed supine on the operative table. The above anesthesia was initiated. The lower extremity was prepped and draped in sterile fashion with a thigh tourniquet. Timeout was performed and preoperative antibiotics were given. Leg was exsanguinated with an Esmarch and tourniquet was inflated to 325 mmHg and deflated at the conclusion of the case during wound closure after less than 100 minutes of tourniquet time. A midline incision was made over the knee and dissection was carried down through subcutaneous tissues and bleeding was controlled with the Bovie. A medial parapatellar arthrotomy was performed to gain access to the knee joint. There was evidence of tricompartmental extensive degenerative joint disease. Release was performed along the deep fibers of the MCL at the medial tibia. ACL was resected. Portions of the fat pad were excised. Pins were placed at the femur and tibia for the robotic arrays. The center of the hip and ankle were identified with the robotic system. The blue probe was used to identify appropriate data points for identifying the anatomy of the femur and tibia. Using the robotic information, the knee was balanced in flexion and extension with appropriate positioning of the trial components using the computer program.    The robotic arm was brought into the field and bony cuts were made at the distal femur and proximal tibia. Excess bone was removed and the medial lateral meniscus were excised. The PCL was protected and intact. Trial of tibial component was placed and pinned in the appropriate rotation. Trial CR femoral component was impacted into place as well. Trial CR articular surface liners were placed in the knee and the knee was brought through range of motion and stress examination.   The size 9mm CR articular surface liner provided the knee with excellent

## 2022-02-22 VITALS
DIASTOLIC BLOOD PRESSURE: 52 MMHG | OXYGEN SATURATION: 97 % | RESPIRATION RATE: 18 BRPM | HEART RATE: 77 BPM | SYSTOLIC BLOOD PRESSURE: 122 MMHG | HEIGHT: 63 IN | WEIGHT: 261 LBS | BODY MASS INDEX: 46.25 KG/M2 | TEMPERATURE: 97.1 F

## 2022-02-22 PROBLEM — Z96.651 S/P TOTAL KNEE REPLACEMENT, RIGHT: Status: ACTIVE | Noted: 2022-02-22

## 2022-02-22 PROCEDURE — G0378 HOSPITAL OBSERVATION PER HR: HCPCS

## 2022-02-22 PROCEDURE — 64450 NJX AA&/STRD OTHER PN/BRANCH: CPT

## 2022-02-22 PROCEDURE — 94761 N-INVAS EAR/PLS OXIMETRY MLT: CPT

## 2022-02-22 PROCEDURE — 94150 VITAL CAPACITY TEST: CPT

## 2022-02-22 PROCEDURE — 94640 AIRWAY INHALATION TREATMENT: CPT

## 2022-02-22 PROCEDURE — 6360000002 HC RX W HCPCS: Performed by: ORTHOPAEDIC SURGERY

## 2022-02-22 PROCEDURE — 6370000000 HC RX 637 (ALT 250 FOR IP): Performed by: ORTHOPAEDIC SURGERY

## 2022-02-22 PROCEDURE — 97110 THERAPEUTIC EXERCISES: CPT

## 2022-02-22 PROCEDURE — 97530 THERAPEUTIC ACTIVITIES: CPT

## 2022-02-22 PROCEDURE — 89220 SPUTUM SPECIMEN COLLECTION: CPT

## 2022-02-22 PROCEDURE — 6370000000 HC RX 637 (ALT 250 FOR IP): Performed by: PHYSICIAN ASSISTANT

## 2022-02-22 PROCEDURE — 2580000003 HC RX 258: Performed by: ORTHOPAEDIC SURGERY

## 2022-02-22 PROCEDURE — 76942 ECHO GUIDE FOR BIOPSY: CPT

## 2022-02-22 PROCEDURE — 94664 DEMO&/EVAL PT USE INHALER: CPT

## 2022-02-22 PROCEDURE — 97116 GAIT TRAINING THERAPY: CPT

## 2022-02-22 RX ORDER — OXYCODONE HYDROCHLORIDE AND ACETAMINOPHEN 5; 325 MG/1; MG/1
1 TABLET ORAL EVERY 6 HOURS PRN
Qty: 28 TABLET | Refills: 0 | Status: SHIPPED | OUTPATIENT
Start: 2022-02-22 | End: 2022-03-01

## 2022-02-22 RX ORDER — NICOTINE 21 MG/24HR
1 PATCH, TRANSDERMAL 24 HOURS TRANSDERMAL DAILY
Qty: 30 PATCH | Refills: 3 | Status: SHIPPED | OUTPATIENT
Start: 2022-02-22

## 2022-02-22 RX ADMIN — OXYCODONE HYDROCHLORIDE AND ACETAMINOPHEN 1 TABLET: 5; 325 TABLET ORAL at 11:13

## 2022-02-22 RX ADMIN — CYCLOBENZAPRINE HYDROCHLORIDE 10 MG: 10 TABLET, FILM COATED ORAL at 12:13

## 2022-02-22 RX ADMIN — SODIUM CHLORIDE, POTASSIUM CHLORIDE, SODIUM LACTATE AND CALCIUM CHLORIDE: 600; 310; 30; 20 INJECTION, SOLUTION INTRAVENOUS at 04:26

## 2022-02-22 RX ADMIN — CEFAZOLIN SODIUM 2000 MG: 10 INJECTION, POWDER, FOR SOLUTION INTRAVENOUS at 02:32

## 2022-02-22 RX ADMIN — FLUOXETINE 40 MG: 20 CAPSULE ORAL at 08:19

## 2022-02-22 RX ADMIN — ACETAMINOPHEN 650 MG: 325 TABLET ORAL at 08:19

## 2022-02-22 RX ADMIN — ASPIRIN 325 MG: 325 TABLET, COATED ORAL at 08:19

## 2022-02-22 RX ADMIN — OXYCODONE HYDROCHLORIDE AND ACETAMINOPHEN 1 TABLET: 5; 325 TABLET ORAL at 06:48

## 2022-02-22 RX ADMIN — ACETAMINOPHEN 650 MG: 325 TABLET ORAL at 04:25

## 2022-02-22 RX ADMIN — Medication 10 ML: at 08:19

## 2022-02-22 RX ADMIN — Medication 1000 UNITS: at 08:19

## 2022-02-22 RX ADMIN — TIOTROPIUM BROMIDE INHALATION SPRAY 2 PUFF: 3.12 SPRAY, METERED RESPIRATORY (INHALATION) at 08:00

## 2022-02-22 ASSESSMENT — PAIN DESCRIPTION - PROGRESSION
CLINICAL_PROGRESSION: GRADUALLY WORSENING

## 2022-02-22 ASSESSMENT — PAIN DESCRIPTION - LOCATION
LOCATION: KNEE
LOCATION: KNEE

## 2022-02-22 ASSESSMENT — PAIN DESCRIPTION - PAIN TYPE
TYPE: SURGICAL PAIN
TYPE: SURGICAL PAIN

## 2022-02-22 ASSESSMENT — PAIN SCALES - GENERAL
PAINLEVEL_OUTOF10: 4
PAINLEVEL_OUTOF10: 6
PAINLEVEL_OUTOF10: 4
PAINLEVEL_OUTOF10: 5

## 2022-02-22 ASSESSMENT — PAIN DESCRIPTION - ORIENTATION: ORIENTATION: RIGHT

## 2022-02-22 NOTE — PROGRESS NOTES
Progress Note      Name:  Arun Ramirez /Age/Sex: 1970  (45 y.o. female)   MRN & CSN:  [id number] & [id number] Encounter Date/Time: 2022 3:41 PM EST   Location:  [place] [address] PCP: Adolfo Shelton MD       Hospital Day: 2    Assessment and Plan:   Medical decision making:   Right knee pain s/p total knee arthroplasty  o POD #1  o Surgery performed by Dr. Garcia Dunne, no complications documented in surgical report  o Pain and nausea control PRN  o Prophylactic DVT prophylaxis with lovenox when ok per surgery   o Going home on ASA BID for 2 weeks post surgery     Obesity BMI 46   Tobacco abuse  o Smokes 1ppd  o Nicorette gum and patch PRN    Chronic conditions: home medications continued unless contraindicated   Anxiety - continue prozac   Insomnia - continue ambien nightly   COPD - continue home spiriva   Hypothyroidism - continue home synthroid   TIA   ROXI   RLS - continue requip   Cervical radiculopathy - continue gabapentin/flexeril   Hx of hypoglycemia - check BS PRN if patient feels symptomatic     WILL SIGN OFF TODAY  Disposition:   Current Living situation: home  Expected Disposition: home when cleared per surgery  Estimated D/C: today  Diet ADULT DIET; Regular; 4 carb choices (60 gm/meal)   DVT Prophylaxis [] Lovenox, []  Heparin, [] SCDs, [] Ambulation,  [] Eliquis, [] Xarelto   Code Status Full Code   Surrogate Decision Maker/ POA child     History from:   Patient   History of Present Illness:   Chief Complaint: knee pain  No overnight events. Ambulating with therapy, no significant post-op pain, tolerating meals. DC today per orthopedics. Will sign off. Patient's past medical, social, and family history have been reviewed. Review of Systems:    10 point system reviewed, negative, unless as noted in above HPI. Objective:        Intake/Output Summary (Last 24 hours) at 2022 0803  Last data filed at 2022 1830  Gross per 24 hour   Intake 2317.33 ml   Output 250 ml Net 2067.33 ml      Vitals:   Vitals:    02/21/22 1900 02/21/22 2149 02/22/22 0000 02/22/22 0615   BP: (!) 113/53 (!) 119/54 (!) 115/56 (!) 120/57   Pulse: 98 89 77 79   Resp: 16 16 14 17   Temp: 98.2 °F (36.8 °C)  98.1 °F (36.7 °C) 97.2 °F (36.2 °C)   TempSrc: Oral  Tympanic Temporal   SpO2:  97% 96%    Weight:       Height:         Medications Prior to Admission     Prior to Admission medications    Medication Sig Start Date End Date Taking? Authorizing Provider   aspirin 325 MG EC tablet Take 1 tablet by mouth 2 times daily 2/22/22  Yes Joellen Zuluaga MD   nicotine (NICODERM CQ) 21 MG/24HR Place 1 patch onto the skin daily 2/22/22  Yes Joellen Zuluaga MD   nicotine polacrilex (NICORETTE) 4 MG gum Take 1 each by mouth every hour as needed for Smoking cessation 2/22/22  Yes Joellen Zuluaga MD   oxyCODONE-acetaminophen (PERCOCET) 5-325 MG per tablet Take 1 tablet by mouth every 6 hours as needed for Pain for up to 7 days. Intended supply: 7 days. Take lowest dose possible to manage pain 2/22/22 3/1/22 Yes Joellen Zuluaga MD   gabapentin (NEURONTIN) 300 MG capsule 300 mg at bedtime. 1/12/22  Yes Historical Provider, MD   cyclobenzaprine (FLEXERIL) 10 MG tablet  1/12/22  Yes Historical Provider, MD   FLUoxetine (PROZAC) 40 MG capsule at bedtime  12/28/21  Yes Historical Provider, MD   zolpidem (AMBIEN) 10 MG tablet Take 10 mg by mouth nightly.    Yes Historical Provider, MD   rOPINIRole (REQUIP) 0.25 MG tablet TAKE 3 TABLETS BY MOUTH 2 HOURS BEFORE BED 4/8/21  Yes Lisbet Hand MD   SPIRIVA RESPIMAT 2.5 MCG/ACT AERS inhaler  11/22/19  Yes Historical Provider, MD   Cholecalciferol (VITAMIN D3) 25 MCG (1000 UT) TABS  10/11/19  Yes Historical Provider, MD   levothyroxine (SYNTHROID) 50 MCG tablet at bedtime  10/17/19  Yes Historical Provider, MD   meloxicam (MOBIC) 15 MG tablet  10/11/19  Yes Historical Provider, MD Viveros Coppersmith strip test 2 - 3 times a day by Transdermal route as directed 12/28/21   Historical Provider, MD   OneTouch Delica Lancets 95R MISC test UP TO THREE TIMES DAILY 21   Historical Provider, MD   Blood Glucose Monitoring Suppl ([address]) w/Device KIT USE AS DIRECTED 10/27/21   Historical Provider, MD   Alcohol Swabs ( STERILE ALCOHOL PREP) PADS take by Topical route every day 21   Historical Provider, MD     Physical Exam:      GEN -Awake. NAD. Appears given age. EYES -PERRLA. No scleral erythema, discharge, or conjunctivitis. HENT -MM are moist. Oral pharynx without exudates, no evidence of thrush. NECK -Supple, no apparent thyromegaly or masses. RESP -CTA, no wheezes, rales or rhonchi. Symmetric chest movement while on room air. C/V -S1/S2 auscultated. RRR without appreciable M/R/G. No JVD or carotid bruits. Peripheral pulses equal bilaterally and palpable. Cap refill <3 sec. No peripheral edema. GI -Abdomen is soft non distended and without significant tenderness to palpation. + BS. No masses or guarding.  -No CVA/ flank tenderness. Stein catheter is not present. LYMPH-No palpable cervical lymphadenopathy and no hepatosplenomegaly. No petechiae or ecchymoses. MS -No gross joint deformities. Right leg in ACE wrap, not fully visualized  SKIN -Normal coloration, warm, dry. Vivian Bares, alert, oriented x  person, place, time, situation. Cranial nerves appear grossly intact, normal speech, no lateralizing weakness. PSYC- Appropriate affect. Past Medical History:   PMHx   Past Medical History:   Diagnosis Date    Anxiety     COPD (chronic obstructive pulmonary disease) (HCC)     Hypoglycemia     history    Panic attacks     Primary osteoarthritis of right knee     Separation anxiety     TIA (transient ischemic attack)      PSHX:  has a past surgical history that includes Hysterectomy;  section; Tubal ligation; Cholecystectomy; and Knee cartilage surgery.   Allergies: No Known Allergies  Fam HX: family history includes Cancer in her father and mother. Soc HX:   Social History     Socioeconomic History    Marital status:      Spouse name: None    Number of children: None    Years of education: None    Highest education level: None   Occupational History    None   Tobacco Use    Smoking status: Current Every Day Smoker     Packs/day: 1.00     Years: 30.00     Pack years: 30.00     Types: Cigarettes    Smokeless tobacco: Never Used   Vaping Use    Vaping Use: Never used   Substance and Sexual Activity    Alcohol use: No     Comment: occasional    Drug use: Never    Sexual activity: Not Currently   Other Topics Concern    None   Social History Narrative    None     Social Determinants of Health     Financial Resource Strain:     Difficulty of Paying Living Expenses: Not on file   Food Insecurity:     Worried About Running Out of Food in the Last Year: Not on file    Fabiana of Food in the Last Year: Not on file   Transportation Needs:     Lack of Transportation (Medical): Not on file    Lack of Transportation (Non-Medical):  Not on file   Physical Activity:     Days of Exercise per Week: Not on file    Minutes of Exercise per Session: Not on file   Stress:     Feeling of Stress : Not on file   Social Connections:     Frequency of Communication with Friends and Family: Not on file    Frequency of Social Gatherings with Friends and Family: Not on file    Attends Mandaen Services: Not on file    Active Member of [address] [place] [address] or Organizations: Not on file    Attends Club or Organization Meetings: Not on file    Marital Status: Not on file   Intimate Partner Violence:     Fear of Current or Ex-Partner: Not on file    Emotionally Abused: Not on file    Physically Abused: Not on file    Sexually Abused: Not on file   Housing Stability:     Unable to Pay for Housing in the Last Year: Not on file    Number of [place] in the Last Year: Not on file    Unstable Housing in the Last Year: Not on file     Medications: Room Number   Visit Number      [id number]          Height               63 inches   Corporate ID      [id number]           Weight               261 pounds   Accession Number  [id number]                                        NM Technologist      [facility]                                                            [facility]   Ordering          Manuel Horan MD                 Cardiologist         Jing Gallagher MD   The procedure was explained in detail to the patient. Risks,  complications and alternative treatments were reviewed. Written consent  was obtained. Conclusions   Summary  Supervising physician Dr. Romana Macias . Normal LV function, normal EDV  There is normal isotope uptake following exercise and at rest. There is no  evidence of exercise induced ischemia. This is a normal study. Recommendation  cleared for surgery from cardiac standpoint on low to moderate  cardiovascular risk   Signatures   ------------------------------------------------------------------  Electronically signed by Stephen West MD  (Interpreting cardiologist) on 02/18/2022 at 14:34  ------------------------------------------------------------------  Procedure Procedure Type:   Nuclear Stress Test:NM MYOCARDIAL SPECT REST EXERCISE OR RX  Indications: Pre-op, abnormal rest ECG and Chest pain. Risk Factors   The patient risk factors include:Current - Every day tobacco use and chronic  lung disease.   Stress Protocols   Resting ECG  nsr   Resting HR:78 bpm  Resting BP:122/84 mmHg  Stress Protocol:Pharmacologic - Lexiscan  Peak HR:100 bpm                          HR response: Appropriate  Peak BP:104/58 mmHg                      BP response: Normal resting BP -  Predicted HR: 167 bpm                    appropriate response  % of predicted HR: 60                    HR/BP product:[id number]   Exercise duration: 01:01 min  Reason for termination:Reached diagnostic endpoint   ECG Findings  nsr   Stress Interpretation  ECG portion of stress test is negative for ischemia by diagnostic criteria. Procedure Medications   - Lexiscan I.V. bolus (over 15sec.) 0.4 mg admininstered @ 02/18/2022 12:30. Imaging Protocols   Rest                                Stress   Isotope:Sestamibi 99mTc             Isotope: Sestamibi 99mTc  Isotope dose:9.2 mCi                Isotope dose:27.1 mCi  Administration route: I.V. Lt. arm  Administration route: I.V. Lt. arm  Injection Date:02/18/2022 11:30     Injection Date:02/18/2022 12:31  Scan Date:02/18/2022 12:00          Scan Date:02/18/2022 13:00   Technique:         SPECT            Technique:          Gated                                                          SPECT   Perfusion Interpretation   Normal tracer uptake in all myocardial segment during rest and stress. Imaging Results Visualization: Good  Rest ejection  Ejection fraction:73 %  EDV :82 ml  ESV :22 ml  Stroke volume :60 ml  Medical History   Accession#:  [id number]  Admission Data Admission date: 02/18/2022 Admission Time: [address] Status: Outpatient. CBC: No results for input(s): WBC, HGB, PLT in the last 72 hours. BMP:  No results for input(s): NA, K, CL, CO2, BUN, CREATININE, GLUCOSE in the last 72 hours. Hepatic: No results for input(s): AST, ALT, ALB, BILITOT, ALKPHOS in the last 72 hours. Lipids: No results found for: CHOL, HDL, TRIG  Hemoglobin A1C: No results found for: LABA1C  TSH: No results found for: TSH  Troponin: No results found for: TROPONINT  Lactic Acid: No results for input(s): LACTA in the last 72 hours. BNP: No results for input(s): PROBNP in the last 72 hours.   UA:  Lab Results   Component Value Date    NITRU NEGATIVE 02/16/2022    NITRU NEGATIVE 06/08/2013    COLORU YELLOW 02/16/2022    WBCUA 1 02/16/2022    RBCUA <1 02/16/2022    MUCUS RARE 02/16/2022    TRICHOMONAS NONE SEEN 05/29/2018    BACTERIA NEGATIVE 02/16/2022    CLARITYU CLEAR 02/16/2022    SPECGRAV >1.030 02/16/2022    LEUKOCYTESUR NEGATIVE 02/16/2022    UROBILINOGEN 1.0 EU 02/16/2022    BILIRUBINUR NEGATIVE 02/16/2022    BLOODU NEGATIVE 02/16/2022    GLUCOSEU neg 06/08/2013    GLUCOSEU NEGATIVE 06/08/2013    KETUA NEGATIVE 02/16/2022     Urine Cultures: No results found for: Octaviano Mckeon  Blood Cultures: No results found for: BC  No results found for: BLOODCULT2  Organism:   Lab Results   Component Value Date    ORG MRSA 03/04/2013     Imaging/Diagnostics Last 24 Hours   XR CHEST (2 VW)    Result Date: 2/16/2022  EXAMINATION: TWO X-RAY VIEWS OF THE CHEST 2/16/2022 10:24 am COMPARISON: June 22, 2015 HISTORY: ORDERING SYSTEM PROVIDED HISTORY: Encounter for preadmission testing TECHNOLOGIST PROVIDED HISTORY: Pre-op testing- Hx of COPD. surgery 2/21/22. PA & Lateral views Reason for exam:->Pre-op testing- Hx of COPD. surgery 2/21/22. PA & Lateral views Reason for Exam: Pre-op testing- Hx of COPD FINDINGS: The cardiomediastinal silhouette is normal in size. The lungs are clear. No pleural effusion or pneumothorax is identified. No evidence of acute cardiopulmonary process.      NM MYOCARDIAL SPECT REST EXERCISE OR RX    Result Date: 2/18/2022  Cardiac Perfusion Imaging   Demographics   Patient Name      [address] [place] [address] Date of study        02/18/2022   Date of Birth     1970         Gender               Female   Age               46 year(s)         Race                    Patient Number    [id number]           Room Number   Visit Number      [id number]          Height               63 inches   Corporate ID      [id number]           Weight               261 pounds   Accession Number  [id number]                                        NM Technologist      Lashonda Kelsey DEVORAH Klein, [facility]   Ordering          Sudhir Syzmanski  Physician         MD                 Cardiologist Jonatan Brito MD   The procedure was explained in detail to the patient. Risks,  complications and alternative treatments were reviewed. Written consent  was obtained. Conclusions   Summary  Supervising physician Dr. Fredo Espana . Normal LV function, normal EDV  There is normal isotope uptake following exercise and at rest. There is no  evidence of exercise induced ischemia. This is a normal study. Recommendation  cleared for surgery from cardiac standpoint on low to moderate  cardiovascular risk   Signatures   ------------------------------------------------------------------  Electronically signed by Rodman Habermann MD  (Interpreting cardiologist) on 02/18/2022 at 14:34  ------------------------------------------------------------------  Procedure Procedure Type:   Nuclear Stress Test:NM MYOCARDIAL SPECT REST EXERCISE OR RX  Indications: Pre-op, abnormal rest ECG and Chest pain. Risk Factors   The patient risk factors include:Current - Every day tobacco use and chronic  lung disease. Stress Protocols   Resting ECG  nsr   Resting HR:78 bpm  Resting BP:122/84 mmHg  Stress Protocol:Pharmacologic - Lexiscan  Peak HR:100 bpm                          HR response: Appropriate  Peak BP:104/58 mmHg                      BP response: Normal resting BP -  Predicted HR: 167 bpm                    appropriate response  % of predicted HR: 60                    HR/BP product:[id number]   Exercise duration: 01:01 min  Reason for termination:Reached  diagnostic endpoint   ECG Findings  nsr   Stress Interpretation  ECG portion of stress test is negative for ischemia by diagnostic criteria. Procedure Medications   - Lexiscan I.V. bolus (over 15sec.) 0.4 mg admininstered @ 02/18/2022 12:30.   Imaging Protocols   Rest                                Stress   Isotope:Sestamibi 99mTc             Isotope: Sestamibi 99mTc  Isotope dose:9.2 mCi                Isotope dose:27.1 mCi  Administration route: I.V. Lt. arm Administration route: I.V. Lt. arm  Injection Date:02/18/2022 11:30     Injection Date:02/18/2022 12:31  Scan Date:02/18/2022 12:00          Scan Date:02/18/2022 13:00   Technique:         SPECT            Technique:          Gated                                                          SPECT   Perfusion Interpretation   Normal tracer uptake in all myocardial segment during rest and stress. Imaging Results Visualization: Good  Rest ejection  Ejection fraction:73 %  EDV :82 ml  ESV :22 ml  Stroke volume :60 ml  Medical History   Accession#:  [id number]  Admission Data Admission date: 02/18/2022 Admission Time: [address] Status: Outpatient.       Personally reviewed Lab Studies, Imaging, and discussed case with Dr. Chandrakant Lujan PA-C  Hospitalist  2/22/2022, 8:03 AM

## 2022-02-22 NOTE — PROGRESS NOTES
1825 Beside report received from [place], [place]. Pt. Is A&Ox4, ICE man applied to knee per order, pt continues to use IS appropriately. Bilat lower extremity pulses are strong, good cap. Refill. 1835 Pt. Ambulated to bathroom, tolerated well. Pt. Continues to use IS appropriately, education provided on call light, call light in reach.

## 2022-02-22 NOTE — PROGRESS NOTES
Physical Therapy    Physical Therapy Treatment Note  Name: Kenan Lua MRN: [id number] :   1970   Date:  2022   Admission Date: 2022 Room:  [place] [address]   Restrictions/Precautions:        s/p R TKA WBAT R LE, general precautions  Communication with other providers:  Per nurse ok to tx  Subjective:  Patient states:  Agreeable to tx  Pain:   Location, Type, Intensity (0/10 to 10/10):  rating pain 6 at end of tx and given ice pk  Objective:    Observation:  Alert and oriented  Treatment, including education/measures:  Sit<=>stand MOD I  amb with rw 50' sba and cues to hold right leg in good alinement with gt. Pt tends to allow right  leg to externally  rotate with gt and ex. Focus of ex  In sup holding right leg in good position with ex. Trunk stretches with deep breathing  10 reps aps  10 reps quad sets  10 reps SLRs with leg  assist and mod assist   blanket roll place next to right leg to hold in good alinement at end of tx session. Safety  Patient left safely in the chair, with call light/phone in reach with alarm applied. Gait belt and mask were used for transfers and gait. Assessment / Impression:       Patient's tolerance of treatment:  good  Adverse Reaction: na  Significant change in status and impact:  na  Barriers to improvement:  Pain, strength, and safety  Plan for Next Session:    Cont.  POC  Time in:  1415  Time out:  1440  Timed treatment minutes:  25  Total treatment time:  25    Previously filed items:     Short term goals  Time Frame for Short term goals: 1 week  Short term goal 1: Pt to complete all bed mobility mod I  Short term goal 2: Pt to complete all STS transfers to/from bed, commode, and chair mod I  Short term goal 3: Pt to ambulate 150' with LRAD mod I  Short term goal 4: Pt to ascend/descend 3 stairs with HR and SBA to simulate home set up       Electronically signed by:    Lorena Payton  2022, 2:40 PM

## 2022-02-22 NOTE — PROGRESS NOTES
Outpatient Pharmacy Progress Note for Meds-to-Beds    Total number of Prescriptions Filled: 1  The following medications were dispensed to the patient during the discharge process:  Oxycodone-APAP 5-325mg    Additional Documentation:  Medication(s) were delivered to the patient's room prior to discharge   The following prescription(s) were not covered under the patient's insurance because they can be purchased as OTC medications: Nicotine patch, nicotine gum, and aspirin 325mg. Patient offered to purchase these as OTCs. Thank you for letting us serve your patients.   [facility]    [address], [address] [facility]    Phone: [phone]    Fax: [phone]

## 2022-02-22 NOTE — PROGRESS NOTES
[facility] Liaison spoke with pt and is aware of discharge & will initiate [facility].

## 2022-02-22 NOTE — DISCHARGE SUMMARY
DISCHARGE SUMMARY:  Rudi García MD, MD     Date of Admission:      2/21/2022  Date of Discharge:    2/22/2022     Admission Diagnosis   Primary osteoarthritis of right knee [M17.11]  Right knee pain, unspecified chronicity [M25.561]  Encounter for preadmission testing [Z01.818]   Discharge Diagnosis   Same    Procedure:    Right Total knee replacement 2/21/2022    Consultations:  IP CONSULT TO HOSPITALIST  IP CONSULT TO CASE MANAGEMENT  IP CONSULT TO HOME CARE NEEDS    Brief history and hospital stay. Dionne Jenkins is a 45 y.o. female who underwent total knee replacement procedure without complication. Dionen Jenkins was admitted to the floor following surgery. Appropriate consults were obtained as outlined in progress notes. The patients diet was advanced as tolerated. The patient remained hemodynamically stable and neurovascularly intact in the lower extremity throughout the hospital course. On the day of discharge the patient's calf remained soft and showed no evidence of DVT. Physical therapy and occupational therapy were consulted. The patient progressed well with PT/OT throughout the stay. DVT prophylaxis was initiated and will continue for 2 weeks. The patients pain was controlled initially with IV pain medications and then was transitioned to oral pain medications prior to discharge    The patient was discharged to Home and will continue to follow the precautions outlined to them by us and PT/OT.      Condition on Discharge: Stable    Medications       Medication List      START taking these medications    aspirin 325 MG EC tablet  Take 1 tablet by mouth 2 times daily     nicotine 21 MG/24HR  Commonly known as: NICODERM CQ  Place 1 patch onto the skin daily     nicotine polacrilex 4 MG gum  Commonly known as: NICORETTE  Take 1 each by mouth every hour as needed for Smoking cessation     oxyCODONE-acetaminophen 5-325 MG per tablet  Commonly known as: Percocet  Take 1 tablet by mouth every 6 hours as needed for Pain for up to 7 days. Intended supply: 7 days. Take lowest dose possible to manage pain        CONTINUE taking these medications    cyclobenzaprine 10 MG tablet  Commonly known as: FLEXERIL     FLUoxetine 40 MG capsule  Commonly known as: PROZAC     gabapentin 300 MG capsule  Commonly known as: NEURONTIN     HM Sterile Alcohol Prep Pads     levothyroxine 50 MCG tablet  Commonly known as: SYNTHROID     meloxicam 15 MG tablet  Commonly known as: MOBIC     OneTouch Delica Lancets 80T Misc     OneTouch Verio Flex System w/Device Kit     OneTouch Verio strip  Generic drug: blood glucose test strips     rOPINIRole 0.25 MG tablet  Commonly known as: REQUIP  TAKE 3 TABLETS BY MOUTH 2 HOURS BEFORE BED     Spiriva Respimat 2.5 MCG/ACT Aers inhaler  Generic drug: tiotropium     vitamin D3 25 MCG (1000 UT) Tabs tablet  Commonly known as: CHOLECALCIFEROL     zolpidem 10 MG tablet  Commonly known as: AMBIEN        STOP taking these medications    traMADol 50 MG tablet  Commonly known as: ULTRAM           Where to Get Your Medications      These medications were sent to [address] [place] [address]    Phone: [phone]   · aspirin 325 MG EC tablet  · nicotine 21 MG/24HR  · nicotine polacrilex 4 MG gum  · oxyCODONE-acetaminophen 5-325 MG per tablet          Plan  Discharge instructions were given along with prescriptions for pain medications as well as DVT prophylaxis. Plan is to follow up in 2-3 weeks for a wound check and x-rays. Patient was instructed on the use of pain medications, the signs and symptoms of infection or blood clot, and was given our number to call should they have any questions or concerns following discharge.     Kesha Lamb MD, MD

## 2022-02-22 NOTE — PROGRESS NOTES
Doing well postoperatively. Pain controlled  Did well with PT    Objective:   Patient Vitals for the past 4 hrs:   BP Temp Temp src Pulse Resp   02/22/22 0615 (!) 120/57 97.2 °F (36.2 °C) Temporal 79 17         Physical Exam:     The patient is awake and alert  Resting comfortably in bed    Operative extremity:    The dressing is clean dry and intact. Incision is intact with Dermabond dressing. No erythema, drainage, or induration. Calf is soft and nontender. Sensation and motor function intact distally      LABS   CBC: No results for input(s): WBC, HGB, PLT in the last 72 hours. Postoperative x-rays show well aligned prosthesis with no complications. Assessment and Plan     1. POD # 1 total knee replacement    1:  Physical therapy consult for mobilization   -Weight-bear as tolerated, progress as tolerated  2:  DVT prophylaxis   -Aspirin twice a day for 2 weeks  3:  Continue Pain Control   -Oral medications as needed, IV medication only for breakthrough pain  4. Medical management per hospitalist service  5:  D/C Planning:    -Discharge to home later today if patient is mobilizing well with physical therapy and pain is well controlled. -Follow-up in 2 weeks for x-rays and wound check.          Maddie Dominguez MD

## 2022-02-22 NOTE — PROGRESS NOTES
Physical Therapy    Physical Therapy Treatment Note  Name: Kenan Lua MRN: [id number] :   1970   Date:  2022   Admission Date: 2022 Room:  [place] [address]   Restrictions/Precautions:        s/p R TKA WBAT R LE, general precautions  Communication with other providers:  Per nurse ok to tx and gave pain meds before tx session  Subjective:  Patient states:  Agreeable to tx  Pain:   Location, Type, Intensity (0/10 to 10/10): Pt stated \"tolerable\"when asked to rate at end of tx session. Objective:    Observation:  Alert and oriented  Treatment, including education/measures:  Sup<=>sit MOD I  Sit<=>stand from bed, chair, and commode sba and cues for hand placement for safety. amb sba with rw 50' x 4 reps. Pt tend to turn right foot external rotation and  needing cues to work on getting leg in good alinement  With gt. Pt followed with chair due to fatigue. Up/down steps with left rail cga and cues for safety. Ex with written copy:  10 reps aps   10 reps quad sets  10 reps glut sets  10 reps heel slides using leg  assist  10 reps saqs   10 reps SLRs with leg  and mod assist  10 reps HS curl standing at walker  Safety  Patient left safely in the bed, with call light/phone in reach with alarm applied. Gait belt and mask were used for transfers and gait. Assessment / Impression:       Patient's tolerance of treatment:  good   Adverse Reaction: na  Significant change in status and impact:  na  Barriers to improvement:  AROM Right knee  Plan for Next Session:    Cont.  POC  Time in:  905  Time out:  1015  Timed treatment minutes:  70  Total treatment time:  79    Previously filed items:     Short term goals  Time Frame for Short term goals: 1 week  Short term goal 1: Pt to complete all bed mobility mod I  Short term goal 2: Pt to complete all STS transfers to/from bed, commode, and chair mod I  Short term goal 3: Pt to ambulate 150' with LRAD mod I  Short term goal 4: Pt to ascend/descend 3 stairs with HR and SBA to simulate home set up       Electronically signed by:    Fidencio Powers PTA  2/22/2022, 8:15 AM

## 2022-02-22 NOTE — CARE COORDINATION
CM met w/ pt to initiate discharge planning. CM introduced self and explained role. Pt is from home w/ her son and daughter in law. Pt plans to return home w/ them at discharge. Pt states she will need a rolling walker, CM advised her that CM is working on getting walker for her. CM discussed therapy recommendations of Jass Borges with her and she is in agreement. Pt states no preference in [address] Referral sent to [facility] liaison. 2:21 PM CM was updated by Esha Fuentes that walker order was sent to [address] on [address]. Address and phone number placed in AVS and updated pts primary RN Jelani Issa and ortho RN Xander Springer.

## 2022-02-22 NOTE — PROGRESS NOTES
Pt stated that her son was unable to  walker at [facility] d/t them stating that they did not have an order. Tyra Perez called to see if the order could be faxed to them d/t pt needing walker this evening. Order faxed per request. Pt son is here to  pt. Vahe Adams to get wc for pt to get to car.

## 2022-02-22 NOTE — CARE COORDINATION
Chart reviewed and noted that pt will need rolling walker at discharge. CM contacted Diana Velasco, for walker to be ready for family/pt to  at office at discharge. Hiram Frankel was evaluated today and a DME order was entered for a wheeled walker because she requires this to successfully complete daily living tasks of eating, bathing, toileting, personal cares, ambulating, grooming and hygiene. A wheeled walker is necessary due to the patient's unsteady gait, upper body weakness, and inability to  an ambulation device; and she can ambulate only by pushing a walker instead of a lesser assistive device such as a cane, crutch, or standard walker. The need for this equipment was discussed with the patient and she understands and is in agreement.

## 2022-02-23 ENCOUNTER — TELEPHONE (OUTPATIENT)
Dept: CARDIOLOGY CLINIC | Age: 52
End: 2022-02-23

## 2022-02-23 NOTE — TELEPHONE ENCOUNTER
Jason Bee .   Aldon Field LV function, normal EDV    There is normal isotope uptake following exercise and at rest. There is no    evidence of exercise induced ischemia.    This is a normal study.        Recommendation    cleared for surgery from cardiac standpoint on low to moderate    cardiovascular risk             Pt's phone number does not accept phone calls at this time per automated voice message.

## 2022-03-08 DIAGNOSIS — Z96.651 S/P TKR (TOTAL KNEE REPLACEMENT), RIGHT: Primary | ICD-10-CM

## 2022-03-08 RX ORDER — OXYCODONE HYDROCHLORIDE AND ACETAMINOPHEN 5; 325 MG/1; MG/1
1 TABLET ORAL EVERY 6 HOURS PRN
Qty: 28 TABLET | Refills: 0 | Status: SHIPPED | OUTPATIENT
Start: 2022-03-08 | End: 2022-03-15

## 2022-03-10 RX ORDER — ROPINIROLE 0.25 MG/1
TABLET, FILM COATED ORAL
Qty: 90 TABLET | Refills: 1 | Status: SHIPPED | OUTPATIENT
Start: 2022-03-10 | End: 2022-03-21 | Stop reason: SDUPTHER

## 2022-03-21 DIAGNOSIS — Z96.651 S/P TKR (TOTAL KNEE REPLACEMENT), RIGHT: Primary | ICD-10-CM

## 2022-03-21 RX ORDER — OXYCODONE HYDROCHLORIDE AND ACETAMINOPHEN 5; 325 MG/1; MG/1
1 TABLET ORAL EVERY 8 HOURS PRN
Qty: 20 TABLET | Refills: 0 | Status: SHIPPED | OUTPATIENT
Start: 2022-03-21 | End: 2022-03-28

## 2022-03-21 RX ORDER — ROPINIROLE 0.25 MG/1
TABLET, FILM COATED ORAL
Qty: 90 TABLET | Refills: 1 | Status: SHIPPED | OUTPATIENT
Start: 2022-03-21

## 2022-03-29 ENCOUNTER — OFFICE VISIT (OUTPATIENT)
Dept: ORTHOPEDIC SURGERY | Age: 52
End: 2022-03-29

## 2022-03-29 VITALS
WEIGHT: 261 LBS | HEIGHT: 63 IN | RESPIRATION RATE: 15 BRPM | OXYGEN SATURATION: 96 % | HEART RATE: 100 BPM | BODY MASS INDEX: 46.25 KG/M2

## 2022-03-29 DIAGNOSIS — Z96.651 S/P TKR (TOTAL KNEE REPLACEMENT), RIGHT: Primary | ICD-10-CM

## 2022-03-29 PROCEDURE — 99024 POSTOP FOLLOW-UP VISIT: CPT

## 2022-03-29 NOTE — PATIENT INSTRUCTIONS
Continue weight-bearing as tolerated. Continue range of motion exercises as instructed. Ice and elevate as needed. Tylenol or Motrin for pain. continue working with physical therapy   Pillow wedge given today to help elevate knee at night.

## 2022-03-29 NOTE — PROGRESS NOTES
Patient returns to the office today for s/p check of the left TKA DOS 2/21/22. Pt states pain today is a 2/10 will increase with prolong standing. Pt states overall she is doing well. Pt states she has been working with physical therapy at home.

## 2022-03-31 DIAGNOSIS — Z96.651 S/P TKR (TOTAL KNEE REPLACEMENT), RIGHT: Primary | ICD-10-CM

## 2022-03-31 ASSESSMENT — ENCOUNTER SYMPTOMS
FACIAL SWELLING: 0
BACK PAIN: 0
COUGH: 0
SHORTNESS OF BREATH: 0
NAUSEA: 0
RHINORRHEA: 0

## 2022-03-31 NOTE — TELEPHONE ENCOUNTER
Pt was seen in office on 3/29/ and forgot to ask for a refill of pain medication (asprin 325) she would life a refill if possible    Please advise

## 2022-03-31 NOTE — PROGRESS NOTES
3/29/2022   Chief Complaint   Patient presents with    Post-Op Check     left TKA DOS 2/21/22        History of Present Illness:                             Bronson Agosto is a 45 y.o. female presenting office today with continued follow-up for postoperative care regarding her left total knee arthroplasty, DOS 2/21/2022. Patient states that she is doing very well and has very minimal pain. She has been working with her physical therapy at home and states that she continues to make gains, however, her pain does increase with prolonged standing. Patient denies any signs of infection such as drainage, erythema, fluctuance, or temperature changes around the incision site. Patient denies calf pain or distal paresthesias to the ankle, foot, or toes. Patient returns to the office today for s/p check of the left TKA DOS 2/21/22. Pt states pain today is a 2/10 will increase with prolong standing. Pt states overall she is doing well. Pt states she has been working with physical therapy at home. Medical History  Patient's medications, allergies, past medical, surgical, social and family histories were reviewed and updated as appropriate. Review of Systems   Constitutional: Negative for fever. HENT: Negative for facial swelling and rhinorrhea. Respiratory: Negative for cough and shortness of breath. Cardiovascular: Negative for chest pain. Gastrointestinal: Negative for nausea. Musculoskeletal: Positive for arthralgias and gait problem. Negative for back pain, joint swelling, myalgias, neck pain and neck stiffness. Skin: Negative for pallor and rash. Neurological: Negative for facial asymmetry and speech difficulty. Psychiatric/Behavioral: Negative for agitation and confusion.                                                Examination:  General Exam:  Vitals: Pulse 100   Resp 15   Ht 5' 3\" (1.6 m)   Wt 261 lb (118.4 kg)   SpO2 96%   BMI 46.23 kg/m²    Physical Exam  Constitutional:       General: She is not in acute distress. Appearance: Normal appearance. She is obese. She is not ill-appearing. HENT:      Head: Normocephalic and atraumatic. Nose: No rhinorrhea. Eyes:      General: No scleral icterus. Right eye: No discharge. Left eye: No discharge. Pulmonary:      Effort: Pulmonary effort is normal. No respiratory distress. Breath sounds: No stridor. Musculoskeletal:      Cervical back: Normal range of motion. Right hip: Normal.      Right knee: Swelling present. No deformity, effusion, erythema, ecchymosis, lacerations, bony tenderness or crepitus. Decreased range of motion. No tenderness. No LCL laxity, MCL laxity, ACL laxity or PCL laxity. Normal alignment, normal meniscus and normal patellar mobility. Normal pulse. Instability Tests: Anterior drawer test negative. Posterior drawer test negative. Medial Elidia test negative and lateral Elidia test negative. Right lower leg: No tenderness. No edema. Right ankle: Normal.      Comments: Right lower extremity exam: Incision site of the right knee TKA appears well approximated without signs of infection such as erythema, drainage, fluctuance, or surrounding temperature changes. Patient able to produce active extension 5 degrees short of full extension, patient only able to reach 90 degrees of active flexion. With passive manipulation patient can reach full extension and 100 degrees flexion. No joint laxity in any direction. Popliteal pulse 2+, Homans' sign negative. Patient maintains full active range of motion of the right hip and right ankle. Skin:     General: Skin is warm and dry. Coloration: Skin is not jaundiced or pale. Neurological:      General: No focal deficit present. Mental Status: She is alert and oriented to person, place, and time.    Psychiatric:         Mood and Affect: Mood normal.         Behavior: Behavior normal. Diagnostic testing:  X-rays reviewed in office, I independently reviewed the films in the office today:     Impression   Total knee arthropasty without acute hardware complication.             Office Procedures:  Orders Placed This Encounter   Procedures    DME Order for (Specify) as OP     - DME device ordered - DME Wedge Pillow, Leg Pillow, Bolster Pillow, or Incline Pillow for Leg Elevation  - Diagnosis: post op right TKA  - Length of Need: 12 Months       Assessment and Plan  1. Status post right TKA, DOS 2/21/2022-Patient encouraged that her knee appears well aligned without hardware complications. Explained her that she also lacks signs of infection such as erythema, fluctuance, drainage, or strain temperature changes. Explained to her that the challenges she faces in her healing process now is that she is very tight and knee flexion for being 5 weeks out from surgery. I explained to her that she will have some catching up to do on her stretching regimen and will have to continually push past pain to prevent stiffness and a possible future manipulation. The patient fully understands the ramifications of not pushing herself to better her knee flexion at this time. She is committed to continuing her home physical therapy and taking a renewed intensity to her knee flexion stretches.  -I demonstrated to the patient methods of placing her knee in 90 degrees of flexion and massaging her quad tendon and using her hand to guide her knee past 90 into the range of around 100 degrees flexion.   I explained to her that the next time she returns the office that my hope for her is to be around 110 degrees flexion.  -Patient advised to continue utilizing rest, ice, elevation, and over-the-counter pain medication to reduce pain and swelling.  -Patient scheduled for follow-up to reassess range of motion gains and reassess the need for her to possibly began therapy at our office if she does not make the adequate gains we discussed.         Electronically signed by Huber Huerta PA-C on 3/31/2022 at 3:34 PM

## 2022-04-01 ENCOUNTER — PATIENT MESSAGE (OUTPATIENT)
Dept: ORTHOPEDIC SURGERY | Age: 52
End: 2022-04-01

## 2022-04-01 DIAGNOSIS — Z96.651 S/P TKR (TOTAL KNEE REPLACEMENT), RIGHT: Primary | ICD-10-CM

## 2022-04-01 RX ORDER — OXYCODONE HYDROCHLORIDE AND ACETAMINOPHEN 5; 325 MG/1; MG/1
1 TABLET ORAL EVERY 6 HOURS PRN
Qty: 28 TABLET | Refills: 0 | Status: CANCELLED | OUTPATIENT
Start: 2022-04-01 | End: 2022-04-08

## 2022-04-01 RX ORDER — OXYCODONE HYDROCHLORIDE AND ACETAMINOPHEN 5; 325 MG/1; MG/1
1 TABLET ORAL EVERY 8 HOURS PRN
Qty: 21 TABLET | Refills: 0 | Status: SHIPPED | OUTPATIENT
Start: 2022-04-01 | End: 2022-04-08

## 2022-04-01 NOTE — TELEPHONE ENCOUNTER
From: Issac Lala  To: Houston Dumont  Sent: 4/1/2022 12:51 PM EDT  Subject: Pain pills    Could u please call in me pain meds

## 2022-04-19 ENCOUNTER — OFFICE VISIT (OUTPATIENT)
Dept: ORTHOPEDIC SURGERY | Age: 52
End: 2022-04-19

## 2022-04-19 VITALS
HEART RATE: 91 BPM | RESPIRATION RATE: 15 BRPM | BODY MASS INDEX: 46.25 KG/M2 | HEIGHT: 63 IN | OXYGEN SATURATION: 98 % | WEIGHT: 261 LBS

## 2022-04-19 DIAGNOSIS — Z96.651 S/P TOTAL KNEE REPLACEMENT, RIGHT: Primary | ICD-10-CM

## 2022-04-19 PROCEDURE — 99024 POSTOP FOLLOW-UP VISIT: CPT | Performed by: ORTHOPAEDIC SURGERY

## 2022-04-19 NOTE — PROGRESS NOTES
Patient returns to the office today for FU of the left TKA DOS 2/21/22. Pt states pain today is 0/10 Pt states overall she is doing well. Pt states she will take her pain medication before her therapy to help with the pain.

## 2022-04-25 NOTE — PROGRESS NOTES
4/19/2022   Chief Complaint   Patient presents with    Post-Op Check     8 wk LT WING DOS 2/21/22   Postop right total knee replacement    History of Present Illness:                             Jayne Rodriguez is a 45 y.o. female who returns today for follow-up of her right total knee replacement. She is been doing well with physical therapy. She has been noticing improved range of motion and strengthening. She is pleased with her progress. No pain today. No complaints    Patient returns to the office today for FU of the right TKA DOS 2/21/22. Pt states pain today is 0/10 Pt states overall she is doing well. Pt states she will take her pain medication before her therapy to help with the pain. Medical History  Patient's medications, allergies, past medical, surgical, social and family histories were reviewed and updated as appropriate. Review of Systems                                            Examination:  General Exam:  Vitals: Pulse 91   Resp 15   Ht 5' 3\" (1.6 m)   Wt 261 lb (118.4 kg)   SpO2 98%   BMI 46.23 kg/m²    Physical Exam     Right Lower Extremity:    The incision is well-healed. No erythema, drainage, or induration. Minimal resolving soft tissue swelling present throughout the soft tissues of the knee. Range of motion is present from full extension to 120 degrees of flexion. Calf is soft and nontender. Negative Homans sign. Sensation and motor function is intact at the knee and ankle. Office Procedures:  No orders of the defined types were placed in this encounter. Assessment and Plan    1. Right total knee replacement    The patient is healing well and making steady improvements in the knee with physical therapy.     I have recommended continuing with rehabilitation as a home program.  The patient understands importance of continuing with regular and aggressive stretching exercises and understands how to continue advancing strengthening and endurance as a home program.    The patient will use over-the-counter medications as needed for pain.     Follow-up in 6 weeks for repeat exam.          Electronically signed by Jacques Saeed MD on 4/25/2022 at 1:59 PM

## 2022-05-02 ENCOUNTER — TELEPHONE (OUTPATIENT)
Dept: ORTHOPEDIC SURGERY | Age: 52
End: 2022-05-02

## 2022-05-02 DIAGNOSIS — Z96.651 S/P TKR (TOTAL KNEE REPLACEMENT), RIGHT: Primary | ICD-10-CM

## 2022-05-03 RX ORDER — OXYCODONE HYDROCHLORIDE AND ACETAMINOPHEN 5; 325 MG/1; MG/1
1 TABLET ORAL 2 TIMES DAILY PRN
Qty: 14 TABLET | Refills: 0 | Status: SHIPPED | OUTPATIENT
Start: 2022-05-03 | End: 2022-05-10

## 2022-08-05 DIAGNOSIS — Z96.651 S/P TKR (TOTAL KNEE REPLACEMENT), RIGHT: ICD-10-CM

## 2022-08-08 RX ORDER — ASPIRIN 325 MG
TABLET, DELAYED RELEASE (ENTERIC COATED) ORAL
Qty: 30 TABLET | Refills: 0 | OUTPATIENT
Start: 2022-08-08

## 2022-09-08 ENCOUNTER — HOSPITAL ENCOUNTER (OUTPATIENT)
Dept: MAMMOGRAPHY | Age: 52
Discharge: HOME | End: 2022-09-08
Payer: MEDICARE

## 2022-09-08 DIAGNOSIS — Z12.31 SCREENING MAMMOGRAM, ENCOUNTER FOR: ICD-10-CM

## 2022-09-08 PROCEDURE — 77063 BREAST TOMOSYNTHESIS BI: CPT

## 2022-09-18 ENCOUNTER — HOSPITAL ENCOUNTER (EMERGENCY)
Age: 52
Discharge: LEFT BEFORE DISCHARGE | End: 2022-09-18

## 2022-09-18 VITALS
OXYGEN SATURATION: 96 % | SYSTOLIC BLOOD PRESSURE: 167 MMHG | RESPIRATION RATE: 18 BRPM | HEIGHT: 63 IN | DIASTOLIC BLOOD PRESSURE: 46 MMHG | BODY MASS INDEX: 47.45 KG/M2 | TEMPERATURE: 98.5 F | WEIGHT: 267.8 LBS | HEART RATE: 98 BPM

## 2022-09-18 NOTE — ED NOTES
This RN witness patient ambulate out of [facility]. This RN requested patient to sign withdrawal form w/out success.      Purnima Almaguer RN  09/18/22 [id number]

## 2022-09-21 DIAGNOSIS — Z96.651 S/P TKR (TOTAL KNEE REPLACEMENT), RIGHT: ICD-10-CM

## 2022-09-28 RX ORDER — ASPIRIN 325 MG
TABLET, DELAYED RELEASE (ENTERIC COATED) ORAL
Qty: 30 TABLET | Refills: 0 | OUTPATIENT
Start: 2022-09-28

## 2023-01-29 ENCOUNTER — HOSPITAL ENCOUNTER (EMERGENCY)
Age: 53
Discharge: HOME | End: 2023-01-29
Payer: MEDICARE

## 2023-01-29 VITALS
HEART RATE: 72 BPM | DIASTOLIC BLOOD PRESSURE: 54 MMHG | TEMPERATURE: 98.1 F | RESPIRATION RATE: 14 BRPM | SYSTOLIC BLOOD PRESSURE: 142 MMHG | OXYGEN SATURATION: 93 %

## 2023-01-29 DIAGNOSIS — R42 DIZZINESS: Primary | ICD-10-CM

## 2023-01-29 LAB
ALBUMIN SERPL-MCNC: 4.6 GM/DL (ref 3.4–5)
ALP BLD-CCNC: 79 IU/L (ref 40–129)
ALT SERPL-CCNC: 59 U/L (ref 10–40)
ANION GAP SERPL CALCULATED.3IONS-SCNC: 14 MMOL/L (ref 4–16)
AST SERPL-CCNC: 27 IU/L (ref 15–37)
BACTERIA: NEGATIVE /HPF
BASOPHILS ABSOLUTE: 0.1 K/CU MM
BASOPHILS RELATIVE PERCENT: 0.6 % (ref 0–1)
BILIRUB SERPL-MCNC: 0.5 MG/DL (ref 0–1)
BILIRUBIN URINE: NEGATIVE MG/DL
BLOOD, URINE: ABNORMAL
BUN BLDV-MCNC: 19 MG/DL (ref 6–23)
CALCIUM SERPL-MCNC: 9.5 MG/DL (ref 8.3–10.6)
CHLORIDE BLD-SCNC: 102 MMOL/L (ref 99–110)
CHP ED QC CHECK: 101
CLARITY: CLEAR
CO2: 22 MMOL/L (ref 21–32)
COLOR: YELLOW
CREAT SERPL-MCNC: 0.6 MG/DL (ref 0.6–1.1)
DIFFERENTIAL TYPE: ABNORMAL
EOSINOPHILS ABSOLUTE: 0.2 K/CU MM
EOSINOPHILS RELATIVE PERCENT: 1.3 % (ref 0–3)
GFR SERPL CREATININE-BSD FRML MDRD: >60 ML/MIN/1.73M2
GLUCOSE BLD-MCNC: 100 MG/DL (ref 70–99)
GLUCOSE BLD-MCNC: 101 MG/DL (ref 70–99)
GLUCOSE, URINE: NEGATIVE MG/DL
HCT VFR BLD CALC: 47.8 % (ref 37–47)
HEMOGLOBIN: 15.7 GM/DL (ref 12.5–16)
IMMATURE NEUTROPHIL %: 0.4 % (ref 0–0.43)
KETONES, URINE: NEGATIVE MG/DL
LACTATE: 0.9 MMOL/L (ref 0.5–1.9)
LEUKOCYTE ESTERASE, URINE: NEGATIVE
LYMPHOCYTES ABSOLUTE: 3 K/CU MM
LYMPHOCYTES RELATIVE PERCENT: 26.1 % (ref 24–44)
MCH RBC QN AUTO: 29.1 PG (ref 27–31)
MCHC RBC AUTO-ENTMCNC: 32.8 % (ref 32–36)
MCV RBC AUTO: 88.7 FL (ref 78–100)
MONOCYTES ABSOLUTE: 0.7 K/CU MM
MONOCYTES RELATIVE PERCENT: 6.1 % (ref 0–4)
NITRITE URINE, QUANTITATIVE: NEGATIVE
NUCLEATED RBC %: 0 %
PDW BLD-RTO: 13.2 % (ref 11.7–14.9)
PH, URINE: 6 (ref 5–8)
PLATELET # BLD: 212 K/CU MM (ref 140–440)
PMV BLD AUTO: 11.9 FL (ref 7.5–11.1)
POTASSIUM SERPL-SCNC: 3.9 MMOL/L (ref 3.5–5.1)
PROTEIN UA: NEGATIVE MG/DL
RBC # BLD: 5.39 M/CU MM (ref 4.2–5.4)
RBC URINE: NORMAL /HPF (ref 0–6)
SEGMENTED NEUTROPHILS ABSOLUTE COUNT: 7.5 K/CU MM
SEGMENTED NEUTROPHILS RELATIVE PERCENT: 65.5 % (ref 36–66)
SODIUM BLD-SCNC: 138 MMOL/L (ref 135–145)
SPECIFIC GRAVITY UA: 1.01 (ref 1–1.03)
SQUAMOUS EPITHELIAL: 2 /HPF
TOTAL IMMATURE NEUTOROPHIL: 0.04 K/CU MM
TOTAL NUCLEATED RBC: 0 K/CU MM
TOTAL PROTEIN: 7.5 GM/DL (ref 6.4–8.2)
TRICHOMONAS: NORMAL /HPF
TROPONIN T: <0.01 NG/ML
TSH HIGH SENSITIVITY: 2.81 UIU/ML (ref 0.27–4.2)
UROBILINOGEN, URINE: 0.2 MG/DL (ref 0.2–1)
WBC # BLD: 11.4 K/CU MM (ref 4–10.5)
WBC UA: 3 /HPF (ref 0–5)

## 2023-01-29 PROCEDURE — 96361 HYDRATE IV INFUSION ADD-ON: CPT

## 2023-01-29 PROCEDURE — 84484 ASSAY OF TROPONIN QUANT: CPT

## 2023-01-29 PROCEDURE — 84443 ASSAY THYROID STIM HORMONE: CPT

## 2023-01-29 PROCEDURE — 83605 ASSAY OF LACTIC ACID: CPT

## 2023-01-29 PROCEDURE — 81001 URINALYSIS AUTO W/SCOPE: CPT

## 2023-01-29 PROCEDURE — 93005 ELECTROCARDIOGRAM TRACING: CPT | Performed by: NURSE PRACTITIONER

## 2023-01-29 PROCEDURE — 80053 COMPREHEN METABOLIC PANEL: CPT

## 2023-01-29 PROCEDURE — 96360 HYDRATION IV INFUSION INIT: CPT

## 2023-01-29 PROCEDURE — 82962 GLUCOSE BLOOD TEST: CPT

## 2023-01-29 PROCEDURE — 99284 EMERGENCY DEPT VISIT MOD MDM: CPT

## 2023-01-29 PROCEDURE — 6370000000 HC RX 637 (ALT 250 FOR IP): Performed by: NURSE PRACTITIONER

## 2023-01-29 PROCEDURE — 85025 COMPLETE CBC W/AUTO DIFF WBC: CPT

## 2023-01-29 PROCEDURE — 2580000003 HC RX 258: Performed by: NURSE PRACTITIONER

## 2023-01-29 RX ORDER — IBUPROFEN 600 MG/1
600 TABLET ORAL ONCE
Status: COMPLETED | OUTPATIENT
Start: 2023-01-29 | End: 2023-01-29

## 2023-01-29 RX ORDER — 0.9 % SODIUM CHLORIDE 0.9 %
1000 INTRAVENOUS SOLUTION INTRAVENOUS ONCE
Status: COMPLETED | OUTPATIENT
Start: 2023-01-29 | End: 2023-01-29

## 2023-01-29 RX ADMIN — IBUPROFEN 600 MG: 600 TABLET, FILM COATED ORAL at 20:48

## 2023-01-29 RX ADMIN — SODIUM CHLORIDE 1000 ML: 9 INJECTION, SOLUTION INTRAVENOUS at 19:09

## 2023-01-29 ASSESSMENT — PAIN DESCRIPTION - LOCATION: LOCATION: HEAD

## 2023-01-29 ASSESSMENT — PAIN DESCRIPTION - DESCRIPTORS: DESCRIPTORS: ACHING

## 2023-01-29 ASSESSMENT — PAIN SCALES - GENERAL: PAINLEVEL_OUTOF10: 4

## 2023-01-29 NOTE — ED PROVIDER NOTES
[address] ENCOUNTER        Pt Name: Patricia Salamanca  MRN: [id number]  [place] 1970  Date of evaluation: 1/29/2023  Provider: MERE Kang - LESLY  PCP: Ekaterina Suarez MD     I have discussed the care of this patient with my supervising physician Dr. Sue Schwartz who is in agreement with the evaluation and plan of care. Triage CHIEF COMPLAINT       Chief Complaint   Patient presents with    Dizziness         HISTORY OF PRESENT ILLNESS      Chief Complaint: dizziness, weakness     Patricia Salamanca is a 45 y.o. female who presents for evaluation of dizziness, weakness for the last week. She denies SOB, CP, palpitations, F/C/N/V, abdominal pain, urinary symptoms. She has had some intermittent diarrhea since being placed on metformin. She feels dehydrated but has been drinking a lot of water. The dizziness is random, occurs sometimes at rest but also when she is walking. She also has been feeling depressed. She stopped the prozac early January when dx with DM but started again 4 days ago. She also stopped taking her levothyroxine at that time. She was diagnosed with DM at the beginning of January 2023, started a very low carb, high protein diet and has lost at least 20 pounds this month. She is not eating anything but baked chicken, eggs, vegetables, sugar free pudding and jello. She was seen at [facility] twice in the last week and they did not find anything on her evaluation. She has stopped smoking this month but is using nicotine replacement patches. Nursing Notes were all reviewed and agreed with or any disagreements were addressed in the HPI. REVIEW OF SYSTEMS     Pertinent ROS as noted in HPI.       PAST MEDICAL HISTORY     Past Medical History:   Diagnosis Date    Anxiety     COPD (chronic obstructive pulmonary disease) (HCC)     Hypoglycemia     history    Panic attacks Primary osteoarthritis of right knee     Separation anxiety     TIA (transient ischemic attack)        SURGICAL HISTORY     Past Surgical History:   Procedure Laterality Date     SECTION      CHOLECYSTECTOMY      HYSTERECTOMY (CERVIX STATUS UNKNOWN)      KNEE CARTILAGE SURGERY      R knee     OVARY REMOVAL      TOTAL KNEE ARTHROPLASTY Right 2022    RIGHT KNEE TOTAL ARTHROPLASTY ROBOTIC performed by Nasra Mcmahon MD at [address] [place] [address], [address]       Discharge Medication List as of 2023  8:45 PM        CONTINUE these medications which have NOT CHANGED    Details   aspirin 325 MG EC tablet Take 1 tablet by mouth 2 times daily, Disp-30 tablet, R-0Normal      rOPINIRole (REQUIP) 0.25 MG tablet TAKE 3 TABLETS BY MOUTH 2 HOURS BEFORE BED, Disp-90 tablet, R-1Normal      nicotine (NICODERM CQ) 21 MG/24HR Place 1 patch onto the skin daily, Disp-30 patch, R-3Normal      nicotine polacrilex (NICORETTE) 4 MG gum Take 1 each by mouth every hour as needed for Smoking cessation, Disp-110 each, R-3Normal      gabapentin (NEURONTIN) 300 MG capsule 300 mg at bedtime. Historical Med      cyclobenzaprine (FLEXERIL) 10 MG tablet Historical Med      FLUoxetine (PROZAC) 40 MG capsule at bedtime Historical Med      ONETOUCH VERIO strip test 2 - 3 times a day by Transdermal route as directed, DAWHistorical Med      OneTouch Delica Lancets 51F MISC test UP TO THREE TIMES DAILYHistorical Med      Blood Glucose Monitoring Suppl (ONETOUCH VERIO FLEX SYSTEM) w/Device KIT USE AS DIRECTEDHistorical Med      Alcohol Swabs ( STERILE ALCOHOL PREP) PADS take by Topical route every dayHistorical Med      zolpidem (AMBIEN) 10 MG tablet Take 10 mg by mouth nightly. Historical Med      SPIRIVA RESPIMAT 2.5 MCG/ACT AERS inhaler R-2, DAWHistorical Med      Cholecalciferol (VITAMIN D3) 25 MCG (1000 UT) TABS R-2Historical Med      levothyroxine (SYNTHROID) 50 MCG tablet at bedtime , R-2Historical Med   meloxicam (MOBIC) 15 MG tablet R-2Historical Med             ALLERGIES     Patient has no known allergies.    FAMILYHISTORY       Family History   Problem Relation Age of Onset    Cancer Mother     Cancer Father     Breast Cancer Neg Hx     Ovarian Cancer Neg Hx         SOCIAL HISTORY       Social History     Socioeconomic History    Marital status:      Spouse name: None    Number of children: None    Years of education: None    Highest education level: None   Tobacco Use    Smoking status: Every Day     Packs/day: 1.00     Years: 30.00     Pack years: 30.00     Types: Cigarettes    Smokeless tobacco: Never   Vaping Use    Vaping Use: Never used   Substance and Sexual Activity    Alcohol use: No     Comment: occasional    Drug use: Never    Sexual activity: Not Currently       SCREENINGS           PHYSICAL EXAM       ED Triage Vitals [01/29/23 1725]   BP Temp Temp Source Heart Rate Resp SpO2 Height Weight   (!) 142/54 98.1 °F (36.7 °C) Oral 72 14 93 % -- --      Constitutional:  Well developed, Well nourished.  No distress  HENT:  Normocephalic, Atraumatic.  PERRL, EOMI.  Sclera normal.  Moist mucous membranes.  Neck/Lymphatics: supple, no swollen nodes  Cardiovascular:   RRR,  no murmurs/rubs/gallops.  Distal cap refill and pulses intact bilateral upper and lower extremities.  no peripheral edema.    Respiratory:   Nonlabored breathing.  Normal breath sounds, No wheezing  Abdomen:  Bowel sounds normal, Soft, No tenderness, no masses.  Musculoskeletal:  Bilateral upper and lower extremity ROM intact without pain or obvious deficit  Integument:   Warm, Dry, No rashes.  Neurologic:  Alert & oriented , No focal deficits noted.   Cranial nerves II through XII grossly intact.   Normal gross motor coordination & motor strength bilateral upper and lower extremities  Sensation intact.  Speech is clear and articulate.  No ataxia.  Psychiatric:  Affect normal, Mood normal.     DIAGNOSTIC RESULTS   LABS:    Labs  Reviewed   URINALYSIS - Abnormal; Notable for the following components:       Result Value    Blood, Urine TRACE (*)     All other components within normal limits   CBC WITH AUTO DIFFERENTIAL - Abnormal; Notable for the following components:    WBC 11.4 (*)     Hematocrit 47.8 (*)     MPV 11.9 (*)     Monocytes % 6.1 (*)     All other components within normal limits   COMPREHENSIVE METABOLIC PANEL - Abnormal; Notable for the following components:    Glucose 100 (*)     ALT 59 (*)     All other components within normal limits   POCT GLUCOSE - Abnormal; Notable for the following components:    POC Glucose 101 (*)     All other components within normal limits   POCT GLUCOSE - Normal   TROPONIN   LACTIC ACID   TSH   MICROSCOPIC URINALYSIS       When ordered, only abnormal lab results are displayed. All other labs were within normal range or not returned as of this dictation. EKG: When ordered, EKG's are interpreted by the Emergency Department Physician in the absence of a cardiologist.  Please see their note for interpretation of EKG. RADIOLOGY:   Non-plain film images such as CT, Ultrasound and MRI are read by the radiologist. Plain radiographic images are visualized and preliminarily interpreted by the  ED Provider with the below findings:    Interpretation perthe Radiologist below, if available at the time of this note:    No orders to display     No results found.       PROCEDURES   Unless otherwise noted below, none         CRITICAL CARE   CRITICAL CARE NOTE:  N/A    CONSULTS:  None      EMERGENCY DEPARTMENT COURSE and MDM:   Vitals:    Vitals:    01/29/23 1725   BP: (!) 142/54   Pulse: 72   Resp: 14   Temp: 98.1 °F (36.7 °C)   TempSrc: Oral   SpO2: 93%       Patient was given thefollowing medications:  Medications   0.9 % sodium chloride bolus (0 mLs IntraVENous Stopped 1/29/23 2047)   ibuprofen (ADVIL;MOTRIN) tablet 600 mg (600 mg Oral Given 1/29/23 2048)           Sepsis Consideration    Exclusion criteria - the patient is NOT to be included for SEP-1 Core Measure due to: Infection is not suspected      MDM:  Patient presents as above. Emergent etiologies considered. Patient seen and examined. Work-up initiated secondary to presentation, physical exam findings, vital signs and medical chart review. Ayah Moreland CNP, am the primary clinician of record. In brief, patient presents for reevaluation of a general sense of low energy, dizziness, and weakness for the past week. She has no complaints in her history suggestive of infection. She has not had any chest pain or shortness of breath. Her exam was generally unremarkable, she is neurologically intact. She was mildly hypertensive but otherwise vitals were not concerning. I reviewed the patient's outpatient records from her visits at [facility] earlier this week and she had complete cardiac evaluation with negative troponin, nonconcerning ECG, normal chest x-ray, and the remainder of her labs including chemistry and CBC were also normal.  A TSH was not checked. I suspected that the patient's symptoms may be secondary to the drastic changes she has made in her diet, stopping her Prozac and levothyroxine over the past month but wanted to rule out possibility of electrolyte abnormality, renal dysfunction, ACS, anemia, or significant hypothyroidism. Given patient's lack of shortness of breath, tachycardia, and hypoxia as well as lack of additional risk factors PE seems unlikely. Wells score and PERC score were negative. Repeat ECG today showed no evidence concerning for ACS. Please see attending note for complete interpretation. Laboratory studies including a CMP and CBC were significant for mildly elevated ALT and mild leukocytosis with a WBC of 11.4. Patient's glucose was 100. Troponin was negative. Lactate was negative. TSH was 2.8 and there was no evidence of infection on urinalysis.   Patient had a negative chest x-ray at [facility] a couple of days ago, has no cough, shortness of breath, or fever concerning for pneumonia and thus was not repeated. Patient was given a 1 L bolus of IV normal saline and felt better after receiving the fluids. At this point, I am not finding any emergent or obvious cause for her symptoms. We had a lengthy conversation about diet and I encouraged her to reincorporate a small amount of complex carbs into her diet and to pursue a nutrition consultation through her primary care physician. I encouraged her to continue her Prozac and to resume her levothyroxine as previously directed. Encouraged her to follow-up with her primary care physician in the next few days for reevaluation and to increase her fluid consumption as I suspect she may be having some dehydration secondary to her dietary changes. Patient verbalized understanding and agreement and felt comfortable with discharge at this time. Encouraged her to return if she develops any new or worsening symptoms. CLINICAL IMPRESSION      1.  Dizziness          DISPOSITION/PLAN   DISPOSITION Decision To Discharge 01/29/2023 08:41:20 PM      PATIENT REFERREDTO:  Tomas Nagel MD  [facility] [id number]  [place] [id number]  [phone]      2-3 days    DISCHARGE MEDICATIONS:  Discharge Medication List as of 1/29/2023  8:45 PM          DISCONTINUED MEDICATIONS:  Discharge Medication List as of 1/29/2023  8:45 PM                 (Please note that portions ofthis note were completed with a voice recognition program.  Efforts were made to edit the dictations but occasionally words are mis-transcribed.)    MERE Cohn CNP (electronically signed)              MERE Pereira CNP  01/30/23 1045

## 2023-01-29 NOTE — ED TRIAGE NOTES
Pt presents to ED by EMS from home due to having some dizziness x1 week. Pt states she was seen at [facility] ED twice thios week and has had CTs, chest x-rays and they cant figure out what is wrong.

## 2023-01-30 NOTE — ED PROVIDER NOTES
I was available for consultation if necessary. RHONA saw the patient independently. Please see their note for detailed h&p, plan, and disposition. 12 lead EKG per my interpretation:  Normal Sinus Rhythm  Rate: 67  QTc is  normal  There are no T wave changes appreciated. There are no ST wave changes appreciated.     Prior EKG to compare with was not available    (All EKG's are interpreted by myself in the absence of a cardiologist)       Dino Davenport MD  01/29/23 6492

## 2023-01-30 NOTE — DISCHARGE INSTRUCTIONS
Resume your prozac and levothyroxine. Follow up with your PCP in the next couple of days. Increase your fluids for better hydration. Reincorporate small amount of whole grain carbohydrates into your diet. Talk to your PCP for referral to dietician.

## 2023-01-31 LAB
EKG ATRIAL RATE: 67 BPM
EKG DIAGNOSIS: NORMAL
EKG P AXIS: 40 DEGREES
EKG P-R INTERVAL: 150 MS
EKG Q-T INTERVAL: 410 MS
EKG QRS DURATION: 90 MS
EKG QTC CALCULATION (BAZETT): 433 MS
EKG R AXIS: 3 DEGREES
EKG T AXIS: 2 DEGREES
EKG VENTRICULAR RATE: 67 BPM

## 2023-01-31 PROCEDURE — 93010 ELECTROCARDIOGRAM REPORT: CPT | Performed by: INTERNAL MEDICINE

## 2023-02-01 ENCOUNTER — HOSPITAL ENCOUNTER (EMERGENCY)
Age: 53
Discharge: HOME | End: 2023-02-01
Attending: EMERGENCY MEDICINE
Payer: MEDICARE

## 2023-02-01 ENCOUNTER — APPOINTMENT (OUTPATIENT)
Dept: GENERAL RADIOLOGY | Age: 53
End: 2023-02-01
Payer: MEDICARE

## 2023-02-01 VITALS
SYSTOLIC BLOOD PRESSURE: 109 MMHG | TEMPERATURE: 98.1 F | WEIGHT: 245 LBS | HEART RATE: 68 BPM | OXYGEN SATURATION: 94 % | RESPIRATION RATE: 16 BRPM | DIASTOLIC BLOOD PRESSURE: 60 MMHG | BODY MASS INDEX: 43.41 KG/M2 | HEIGHT: 63 IN

## 2023-02-01 DIAGNOSIS — F32.A DEPRESSION, UNSPECIFIED DEPRESSION TYPE: ICD-10-CM

## 2023-02-01 DIAGNOSIS — R53.83 FATIGUE, UNSPECIFIED TYPE: Primary | ICD-10-CM

## 2023-02-01 LAB
ALBUMIN SERPL-MCNC: 4.5 GM/DL (ref 3.4–5)
ALP BLD-CCNC: 74 IU/L (ref 40–129)
ALT SERPL-CCNC: 54 U/L (ref 10–40)
ANION GAP SERPL CALCULATED.3IONS-SCNC: 13 MMOL/L (ref 4–16)
AST SERPL-CCNC: 26 IU/L (ref 15–37)
B PARAP IS1001 DNA NPH QL NAA+NON-PROBE: NOT DETECTED
B PERT.PT PRMT NPH QL NAA+NON-PROBE: NOT DETECTED
BASOPHILS ABSOLUTE: 0.1 K/CU MM
BASOPHILS RELATIVE PERCENT: 0.4 % (ref 0–1)
BILIRUB SERPL-MCNC: 0.5 MG/DL (ref 0–1)
BUN SERPL-MCNC: 20 MG/DL (ref 6–23)
C PNEUM DNA NPH QL NAA+NON-PROBE: NOT DETECTED
CALCIUM SERPL-MCNC: 9.6 MG/DL (ref 8.3–10.6)
CHLORIDE BLD-SCNC: 104 MMOL/L (ref 99–110)
CO2: 21 MMOL/L (ref 21–32)
CREAT SERPL-MCNC: 0.6 MG/DL (ref 0.6–1.1)
DIFFERENTIAL TYPE: ABNORMAL
EOSINOPHILS ABSOLUTE: 0.1 K/CU MM
EOSINOPHILS RELATIVE PERCENT: 1 % (ref 0–3)
FLUAV H1 2009 PAN RNA NPH NAA+NON-PROBE: NOT DETECTED
FLUAV H1 RNA NPH QL NAA+NON-PROBE: NOT DETECTED
FLUAV H3 RNA NPH QL NAA+NON-PROBE: NOT DETECTED
FLUAV RNA NPH QL NAA+NON-PROBE: NOT DETECTED
FLUBV RNA NPH QL NAA+NON-PROBE: NOT DETECTED
GFR SERPL CREATININE-BSD FRML MDRD: >60 ML/MIN/1.73M2
GLUCOSE BLD-MCNC: 114 MG/DL (ref 70–99)
GLUCOSE SERPL-MCNC: 106 MG/DL (ref 70–99)
HADV DNA NPH QL NAA+NON-PROBE: NOT DETECTED
HCOV 229E RNA NPH QL NAA+NON-PROBE: NOT DETECTED
HCOV HKU1 RNA NPH QL NAA+NON-PROBE: NOT DETECTED
HCOV NL63 RNA NPH QL NAA+NON-PROBE: NOT DETECTED
HCOV OC43 RNA NPH QL NAA+NON-PROBE: NOT DETECTED
HCT VFR BLD CALC: 46.2 % (ref 37–47)
HEMOGLOBIN: 15.4 GM/DL (ref 12.5–16)
HMPV RNA NPH QL NAA+NON-PROBE: NOT DETECTED
HPIV1 RNA NPH QL NAA+NON-PROBE: NOT DETECTED
HPIV2 RNA NPH QL NAA+NON-PROBE: NOT DETECTED
HPIV3 RNA NPH QL NAA+NON-PROBE: NOT DETECTED
HPIV4 RNA NPH QL NAA+NON-PROBE: NOT DETECTED
IMMATURE NEUTROPHIL %: 0.4 % (ref 0–0.43)
LYMPHOCYTES ABSOLUTE: 2.1 K/CU MM
LYMPHOCYTES RELATIVE PERCENT: 17.5 % (ref 24–44)
M PNEUMO DNA NPH QL NAA+NON-PROBE: NOT DETECTED
MAGNESIUM: 2.2 MG/DL (ref 1.8–2.4)
MCH RBC QN AUTO: 29.3 PG (ref 27–31)
MCHC RBC AUTO-ENTMCNC: 33.3 % (ref 32–36)
MCV RBC AUTO: 87.8 FL (ref 78–100)
MONOCYTES ABSOLUTE: 0.8 K/CU MM
MONOCYTES RELATIVE PERCENT: 6.2 % (ref 0–4)
NUCLEATED RBC %: 0 %
PDW BLD-RTO: 13.1 % (ref 11.7–14.9)
PLATELET # BLD: 193 K/CU MM (ref 140–440)
PMV BLD AUTO: 12 FL (ref 7.5–11.1)
POTASSIUM SERPL-SCNC: 4.1 MMOL/L (ref 3.5–5.1)
PRO-BNP: 26.55 PG/ML
RBC # BLD: 5.26 M/CU MM (ref 4.2–5.4)
RSV RNA NPH QL NAA+NON-PROBE: NOT DETECTED
RV+EV RNA NPH QL NAA+NON-PROBE: NOT DETECTED
SARS-COV-2 RNA NPH QL NAA+NON-PROBE: NOT DETECTED
SEGMENTED NEUTROPHILS ABSOLUTE COUNT: 9.1 K/CU MM
SEGMENTED NEUTROPHILS RELATIVE PERCENT: 74.5 % (ref 36–66)
SODIUM BLD-SCNC: 138 MMOL/L (ref 135–145)
TOTAL IMMATURE NEUTOROPHIL: 0.05 K/CU MM
TOTAL NUCLEATED RBC: 0 K/CU MM
TOTAL PROTEIN: 7.3 GM/DL (ref 6.4–8.2)
TROPONIN T: <0.01 NG/ML
TSH SERPL DL<=0.005 MIU/L-ACNC: 1.95 UIU/ML (ref 0.27–4.2)
WBC # BLD: 12.3 K/CU MM (ref 4–10.5)

## 2023-02-01 PROCEDURE — 80053 COMPREHEN METABOLIC PANEL: CPT

## 2023-02-01 PROCEDURE — 82962 GLUCOSE BLOOD TEST: CPT

## 2023-02-01 PROCEDURE — 83880 ASSAY OF NATRIURETIC PEPTIDE: CPT

## 2023-02-01 PROCEDURE — 0202U NFCT DS 22 TRGT SARS-COV-2: CPT

## 2023-02-01 PROCEDURE — 84443 ASSAY THYROID STIM HORMONE: CPT

## 2023-02-01 PROCEDURE — 2580000003 HC RX 258: Performed by: PHYSICIAN ASSISTANT

## 2023-02-01 PROCEDURE — 99285 EMERGENCY DEPT VISIT HI MDM: CPT

## 2023-02-01 PROCEDURE — 84484 ASSAY OF TROPONIN QUANT: CPT

## 2023-02-01 PROCEDURE — 85025 COMPLETE CBC W/AUTO DIFF WBC: CPT

## 2023-02-01 PROCEDURE — 93005 ELECTROCARDIOGRAM TRACING: CPT | Performed by: PHYSICIAN ASSISTANT

## 2023-02-01 PROCEDURE — 71045 X-RAY EXAM CHEST 1 VIEW: CPT

## 2023-02-01 PROCEDURE — 83735 ASSAY OF MAGNESIUM: CPT

## 2023-02-01 RX ORDER — 0.9 % SODIUM CHLORIDE 0.9 %
1000 INTRAVENOUS SOLUTION INTRAVENOUS ONCE
Status: COMPLETED | OUTPATIENT
Start: 2023-02-01 | End: 2023-02-01

## 2023-02-01 RX ADMIN — SODIUM CHLORIDE 1000 ML: 9 INJECTION, SOLUTION INTRAVENOUS at 11:26

## 2023-02-01 NOTE — ED PROVIDER NOTES
I independently examined and evaluated Latisha Rolon. In brief, 78-year-old female presents with complaint of weakness and fatigue. She had been having some intermittent diarrhea after being placed on metformin, had a recent diagnosis of diabetes. She has been seen between our ER and the [facility] ER 5 times in the last 10 days. She does admit to me today that she had stopped smoking as well as stopped intake of caffeine 10 days ago, because of this diagnosis of diabetes. She reports that her anxiety \"is through the roof\". She is tearful, denies suicidal ideations but states \"but you never know\". She would like to talk to somebody from mental health. Admits that a lot of the symptoms appear to be related to that, and she is going out of her mind with craving. .    Focused exam revealed patient is tearful, otherwise appropriate. Regular rate and rhythm, nonlabored respirations. Abdomen soft and nondistended. No peripheral edema. CC/HPI Summary, DDx, ED Course, and Reassessment: Patient has had multiple evaluations and work-ups over the last 10 days, she had a work-up here on the 29th, and I did review those labs. She had had some mild hypertension at the time. She had very stable labs. She was seen yesterday for fatigue and anxiety at the [facility] ER, seen on the 26th for dizziness, seen on the 22nd for fatigue. Her vitals are completely normal here today, labs had been drawn prior to my evaluation, but at this time she is recognizing that most of the symptoms appear to be anxiety related, and likely from stopping nicotine and caffeine both at the same time and abruptly. We will consult our mental health team, she does not require a sitter or suicide precautions as she is not suicidal, as we ask further questions, it sounds like she is just having very severe anxiety, and mood swings. She is comfortable with this plan.   She is medically cleared for their evaluation         History from : Patient    Limitations to history : None    Patient was given the following medications:  Medications   0.9 % sodium chloride bolus (1,000 mLs IntraVENous New Bag 2/1/23 1126)         EKG (if obtained): (All EKG's are interpreted by myself in the absence of a cardiologist) normal sinus rhythm with rate of 65 bpm, normal normals. No ST elevation. No previous to compare    Chronic conditions affecting care: Nicotine dependence, bipolar disorder    Discussion with Other Profesionals : Social Work - they will evaluate patient. Social Determinants : None    Records Reviewed : External ED Note as above. Disposition Considerations (tests considered but not done, Shared Decision Making, Pt Expectation of Test or Tx.):   Pending evaluation by [place] [address] team. Otherwise appropriate for discharge home. I am the Primary Clinician of Record. All diagnostic, treatment, and disposition decisions were made by myself in conjunction with the advanced practice provider. I personally saw the patient and performed a substantive portion of the visit including all aspects of the medical decision making. For all further details of the patient's emergency department visit, please see the advanced practice provider's documentation. Comment: Please note this report has been produced using speech recognition software and may contain errors related to that system including errors in grammar, punctuation, and spelling, as well as words and phrases that may be inappropriate. If there are any questions or concerns please feel free to contact the dictating provider for clarification.        Valerio Conrad MD  02/01/23 3082

## 2023-02-01 NOTE — ED NOTES
Patient given resource information and encouraged to go to [facility] walk in clinic in the morning. Patient also given emergency resource phone numbers. Patient voiced understanding and agreement.      DARIA Gann  02/01/23 1520

## 2023-02-01 NOTE — ED NOTES
Chief Complaint:    Fatigue and MH assessment      Provisional Diagnosis: Anxiety  Depression        Risk, Psychosocial and Contextual Factors: Patient has been to ED 5 times due to her presenting complaint. Current MH Treatment: Denies any current        Present Suicidal Behavior: Denies    Verbal: Denies    Attempt: Denies      Access to Weapons: DENIES      C-SSRS Current Suicide Risk: Low, Moderate or High:          Past Suicidal Behavior: DENIES    Verbal:    Attempts:      Self-Injurious/Self-Mutilation: DENIES      Traumatic Event Within Past 2 Weeks: Recently dx as diabetic. Patient very anxious about this. She quit caffeine and smoking. Current Abuse:  Denies      Legal: Denies      Violence: Not violent      Protective Factors:  Son is supportive. Patient has good insight      Housing: Lives alone with pets        Clinical Summary:  Patient presents to ED for fatigue but complains of anxiety due to a recent diabetes dx. Patient is fearful of dying. Patient gave up caffeine and smoking and states she is even more anxious. Patient asking for resources to help with her anxiety. Patient denies SI/HI, plan or intent. Patient denies AVH. Patient states she sleeps poorly 4 hrs. Per night and wakes frequently. Patient states appetite is fair. Patient denies alcohol or any illicit drug use. Patient denies any MH services. Patient does take Prozac. She temporarily stopped taking it for 2 weeks but has started taking it again and states it does help. Patient agreeable to following up with [facility] for outpt services. Level of Care Disposition:      Consulted with medical provider. Patient is medically stabilized. Consulted with patients RN about abnormalities or medical concerns. No abnormalities or medical concerns noted. Consulted with Debbie Dash PA-C___Patient to be. Discharged to home with follow up resources.            DARIA Archer  02/01/23 6187

## 2023-02-01 NOTE — ED NOTES
Discharge instructions given. Pt verbalized understanding. Pt ambulated to waiting room.         Gautam Trujillo RN  02/01/23 3603

## 2023-02-01 NOTE — ED TRIAGE NOTES
Pt brought to the ED by EMS for generalized weakness for 10 days. Per EMS, pt has been in both [place] ED and [facility] ED the past 5 days for the same. Pt states she recently quit caffeine and sugar due to being a new diabetic. Pt states due to quitting the caffeine and sugar, she has been feeling weak.

## 2023-02-01 NOTE — ED PROVIDER NOTES
EMERGENCY DEPARTMENT ENCOUNTER        Pt Name: Kira Jackson  MRN: [id number]  [place] 1970  Date of evaluation: 2/1/2023  Provider: ADELAIDE Mathias  PCP: Sulma Jeffers MD    RHONA. I have evaluated this patient. My supervising physician was available for consultation. Triage CHIEF COMPLAINT       Chief Complaint   Patient presents with    Fatigue     X 10 days, has been in and out of [facility] ED and [facility] ED x 5 days     HISTORY OF PRESENT ILLNESS      Chief Complaint: Fatigue, anxiety, depression    Kira Jackson is a 45 y.o. female who presents to the emergency department today via EMS with continued fatigue, malaise, vague symptoms. Patient has been recently seen and evaluated several times at neighboring emergency departments, her primary care doctor and here in [place]. She states that she is recently quit caffeine and also quit smoking and has been watching her carbohydrate intake secondary to being borderline diabetic. She states that she just feels \"wiped\". States he is too fatigued. Has had broad work-ups that have been negative recently. There is a concern that it be a mental health component. Patient does admit that she is depressed and anxious. She is not suicidal but feels like that may have something to do with it. Patient also states that she has not been using her CPAP at night and she has a history of sleep apnea. On chart review it does look like she is had history of hypersomnia in the past that may been secondary to that. Nursing Notes were all reviewed and agreed with or any disagreements were addressed in the HPI. REVIEW OF SYSTEMS     At least 10 systems reviewed and otherwise acutely negative except as in the [address]      PAST MEDICAL HISTORY     Past Medical History:   Diagnosis Date    Anxiety     COPD (chronic obstructive pulmonary disease) (HCC)     Hypoglycemia     history    Panic attacks     Primary osteoarthritis of right knee Separation anxiety     TIA (transient ischemic attack)        SURGICAL HISTORY     Past Surgical History:   Procedure Laterality Date     SECTION      CHOLECYSTECTOMY      HYSTERECTOMY (CERVIX STATUS UNKNOWN)      KNEE CARTILAGE SURGERY      R knee     OVARY REMOVAL      TOTAL KNEE ARTHROPLASTY Right 2022    RIGHT KNEE TOTAL ARTHROPLASTY ROBOTIC performed by Christiana Green MD at [address] [place] [address], [address]       Previous Medications    ALCOHOL SWABS (HM STERILE ALCOHOL PREP) PADS    take by Topical route every day    ASPIRIN 325 MG EC TABLET    Take 1 tablet by mouth 2 times daily    BLOOD GLUCOSE MONITORING SUPPL (ONETOUCH VERIO FLEX SYSTEM) W/DEVICE KIT    USE AS DIRECTED    CHOLECALCIFEROL (VITAMIN D3) 25 MCG (1000 UT) TABS        CYCLOBENZAPRINE (FLEXERIL) 10 MG TABLET        FLUOXETINE (PROZAC) 40 MG CAPSULE    at bedtime     GABAPENTIN (NEURONTIN) 300 MG CAPSULE    300 mg at bedtime. LEVOTHYROXINE (SYNTHROID) 50 MCG TABLET    at bedtime     MELOXICAM (MOBIC) 15 MG TABLET        NICOTINE (NICODERM CQ) 21 MG/24HR    Place 1 patch onto the skin daily    NICOTINE POLACRILEX (NICORETTE) 4 MG GUM    Take 1 each by mouth every hour as needed for Smoking cessation    ONETOUCH DELICA LANCETS 33H MISC    test UP TO THREE TIMES DAILY    ONETOUCH VERIO STRIP    test 2 - 3 times a day by Transdermal route as directed    ROPINIROLE (REQUIP) 0.25 MG TABLET    TAKE 3 TABLETS BY MOUTH 2 HOURS BEFORE BED    SPIRIVA RESPIMAT 2.5 MCG/ACT AERS INHALER        ZOLPIDEM (AMBIEN) 10 MG TABLET    Take 10 mg by mouth nightly. ALLERGIES     Patient has no known allergies.     FAMILYHISTORY       Family History   Problem Relation Age of Onset    Cancer Mother     Cancer Father     Breast Cancer Neg Hx     Ovarian Cancer Neg Hx         SOCIAL HISTORY       Social History     Socioeconomic History    Marital status:      Spouse name: None    Number of children: None    Years of education: None    Highest education level: None   Tobacco Use    Smoking status: Every Day     Packs/day: 1.00     Years: 30.00     Pack years: 30.00     Types: Cigarettes    Smokeless tobacco: Never   Vaping Use    Vaping Use: Never used   Substance and Sexual Activity    Alcohol use: No     Comment: occasional    Drug use: Never    Sexual activity: Not Currently       SCREENINGS    Simla Coma Scale  Eye Opening: Spontaneous  Best Verbal Response: Oriented  Best Motor Response: Obeys commands  Aryan Coma Scale Score: 15      PHYSICAL EXAM       ED Triage Vitals [02/01/23 1036]   BP Temp Temp Source Heart Rate Resp SpO2 Height Weight   109/60 98.1 °F (36.7 °C) Oral 68 16 94 % 5' 3\" (1.6 m) 245 lb (111.1 kg)      Constitutional:  Well developed, Well nourished. No distress  HENT:  Normocephalic, Atraumatic, PERRL. EOMI. Sclera clear. Conjunctiva normal, No discharge. Oropharynx is within normal limits, no tonsillar hypertrophy white exudate, tolerating secretions. Bilateral TMs are pearly white without signs of significant erythema or effusion. No perforation. No mastoid tenderness. Neck/Lymphatics:  supple, no JVD, no swollen nodes  Cardiovascular:   RRR,  no murmurs/rubs/gallops. Respiratory:   Nonlabored breathing. Normal breath sounds, No wheezing  Abdomen: Bowel sounds normal, Soft, No tenderness, no masses. :  No significant flank tenderness to percussion. Musculoskeletal:    There is no edema, asymmetry, or calf / thigh tenderness bilaterally. No cyanosis. No cool or pale-appearing limb. Distal cap refill and pulses intact bilateral upper and lower extremities  Bilateral upper and lower extremity ROM intact without pain or obvious deficit  Integument:   Warm, Dry  Neurologic:  Alert & oriented , No focal deficits noted. Cranial nerves II through XII grossly intact.    Normal gross motor coordination & motor strength bilateral upper and lower extremities  Sensation intact. Psychiatric:  Affect normal, Mood normal.     DIAGNOSTIC RESULTS   LABS:    Labs Reviewed   CBC WITH AUTO DIFFERENTIAL - Abnormal; Notable for the following components:       Result Value    WBC 12.3 (*)     MPV 12.0 (*)     Segs Relative 74.5 (*)     Lymphocytes % 17.5 (*)     Monocytes % 6.2 (*)     All other components within normal limits   COMPREHENSIVE METABOLIC PANEL - Abnormal; Notable for the following components:    Glucose 106 (*)     ALT 54 (*)     All other components within normal limits   POCT GLUCOSE - Abnormal; Notable for the following components:    POC Glucose 114 (*)     All other components within normal limits   RESPIRATORY PANEL, MOLECULAR, WITH COVID-19   TROPONIN   MAGNESIUM   TSH   BRAIN NATRIURETIC PEPTIDE       When ordered, only abnormal lab results are displayed. All other labs were within normal range or not returned as of this dictation. EKG: When ordered, EKG's are interpreted by the Emergency Department Physician in the absence of a cardiologist.  Please see their note for interpretation of EKG. RADIOLOGY:   Non-plain film images such as CT, Ultrasound and MRI are read by the radiologist. Plain radiographic images are visualized and preliminarily interpreted by the  ED Provider with the below findings:    Interpretation perthe Radiologist below, if available at the time of this note:    XR CHEST PORTABLE   Final Result   1. Cardiomegaly with mild vascular congestion. XR CHEST PORTABLE    Result Date: 2/1/2023  EXAMINATION: ONE XRAY VIEW OF THE CHEST 2/1/2023 11:18 am COMPARISON: 02/16/2022 HISTORY: ORDERING SYSTEM PROVIDED HISTORY: Chest pain TECHNOLOGIST PROVIDED HISTORY: Reason for exam:->Chest pain Reason for Exam: chest pain FINDINGS: The heart size is enlarged. The pulmonary vasculature is mildly congested. No acute infiltrates are seen. No pneumothoraces are noted. 1. Cardiomegaly with mild vascular congestion.          PROCEDURES   Unless otherwise noted below, none     CRITICAL CARE   CRITICAL CARE NOTE:   N/A    CONSULTS:  IP CONSULT TO PSYCHOLOGY      EMERGENCY DEPARTMENT COURSE and MDM:   Vitals:    Vitals:    02/01/23 1036 02/01/23 1046 02/01/23 1222   BP: 109/60     Pulse: 68  68   Resp: 16     Temp: 98.1 °F (36.7 °C)     TempSrc: Oral     SpO2: 94%     Weight: 245 lb (111.1 kg) 245 lb (111.1 kg)    Height: 5' 3\" (1.6 m) 5' 3\" (1.6 m)        Patient was given thefollowing medications:  Medications   0.9 % sodium chloride bolus (0 mLs IntraVENous Stopped 2/1/23 1349)         Is this patient to be included in the SEP-1 Core Measure due to severe sepsis or septic shock? No   Exclusion criteria - the patient is NOT to be included for SEP-1 Core Measure due to: Infection is not suspected    MDM:    CC/HPI Summary, DDx, ED Course, and Reassessment:     Patient presents as above. Emergent etiologies considered. Patient seen and examined. Work-up initiated secondary to presentation, physical exam findings, vital signs and medical chart review. In brief, 49-year-old female presented emerged part today with a chief complaint of fatigue. The patient states has been ongoing since she recently gave up caffeine, cigarettes and has been watching her sugar intake. She has been seen and evaluated for other times in the last week with extensive work-ups that have been negative, states that she is even seen her primary care doctor without significant improvement or direction to go. She is returning back today with these continued symptoms. He overall is hemodynamically stable, heart lung sounds within normal limits, abdomen soft, no flank tenderness. No over signs of peripheral edema. And work-up ensued, EKG normal sinus rhythm, cardiac enzyme testing within normal limits. Chest x-ray showing cardiomegaly with mild vascular pulmonary congestion. Has no other electrolyte and/or metabolic abnormalities. Respiratory disease panel negative. Patient was given fluids. Attending physician did evaluate the patient, and that she did allude to having some mental health issues with depression and anxiety. Not suicidal and or homicidal but is requesting to be seen and evaluated by mental health professional.    Patient was seen and evaluated given outpatient information for follow-up. At this point her medical screening was negative. She can be discharged with outpatient follow-up. She can see her primary care/[facility] health. Will be released from the hospital.    History from : Patient    Limitations to history : None    Patient was given the following medications:  Medications   0.9 % sodium chloride bolus (0 mLs IntraVENous Stopped 2/1/23 4532)     Chronic conditions affecting care: Bipolar depression, anxiety    Discussion with Other Profesionals : Mental health crisis professional.    Social Determinants : None    Records Reviewed : Care Everywhere   and External ED Note read through all the encounters and work-up that was performed over the last week    Appropriate for outpatient management      I am the Primary Clinician of Record. CLINICAL IMPRESSION      1. Fatigue, unspecified type    2. Depression, unspecified depression type          DISPOSITION/PLAN   DISPOSITION Decision To Discharge 02/01/2023 03:26:31 PM      PATIENT REFERREDTO:  Andres Clinton MD  [facility] [address]  [id number]  [facility]  [phone]    Schedule an appointment as soon as possible for a visit       DISCHARGE MEDICATIONS:  New Prescriptions    No medications on file       DISCONTINUED MEDICATIONS:  Discontinued Medications    No medications on file              (Please note that portions ofthis note were completed with a voice recognition program.  Efforts were made to edit the dictations but occasionally words are mis-transcribed. )    ADELAIDE Sanchez (electronically signed)             ADELAIDE Cornejo  02/01/23 [facility]

## 2023-02-02 LAB
EKG ATRIAL RATE: 65 BPM
EKG DIAGNOSIS: NORMAL
EKG P AXIS: 50 DEGREES
EKG P-R INTERVAL: 152 MS
EKG Q-T INTERVAL: 398 MS
EKG QRS DURATION: 92 MS
EKG QTC CALCULATION (BAZETT): 413 MS
EKG R AXIS: 8 DEGREES
EKG T AXIS: 16 DEGREES
EKG VENTRICULAR RATE: 65 BPM

## 2023-02-02 PROCEDURE — 93010 ELECTROCARDIOGRAM REPORT: CPT | Performed by: INTERNAL MEDICINE

## 2023-02-04 ENCOUNTER — HOSPITAL ENCOUNTER (OUTPATIENT)
Age: 53
Setting detail: OBSERVATION
Discharge: HOME | End: 2023-02-06
Attending: EMERGENCY MEDICINE | Admitting: STUDENT IN AN ORGANIZED HEALTH CARE EDUCATION/TRAINING PROGRAM
Payer: MEDICARE

## 2023-02-04 ENCOUNTER — APPOINTMENT (OUTPATIENT)
Dept: GENERAL RADIOLOGY | Age: 53
End: 2023-02-04
Payer: MEDICARE

## 2023-02-04 DIAGNOSIS — R07.9 CHEST PAIN, UNSPECIFIED TYPE: Primary | ICD-10-CM

## 2023-02-04 DIAGNOSIS — E11.9 TYPE 2 DIABETES MELLITUS WITHOUT COMPLICATION, WITHOUT LONG-TERM CURRENT USE OF INSULIN (HCC): ICD-10-CM

## 2023-02-04 LAB
ALBUMIN SERPL-MCNC: 4.5 GM/DL (ref 3.4–5)
ALP BLD-CCNC: 73 IU/L (ref 40–129)
ALT SERPL-CCNC: 57 U/L (ref 10–40)
ANION GAP SERPL CALCULATED.3IONS-SCNC: 13 MMOL/L (ref 4–16)
AST SERPL-CCNC: 28 IU/L (ref 15–37)
BASOPHILS ABSOLUTE: 0.1 K/CU MM
BASOPHILS ABSOLUTE: 0.1 K/CU MM
BASOPHILS RELATIVE PERCENT: 0.5 % (ref 0–1)
BASOPHILS RELATIVE PERCENT: 0.5 % (ref 0–1)
BILIRUB SERPL-MCNC: 0.6 MG/DL (ref 0–1)
BUN SERPL-MCNC: 20 MG/DL (ref 6–23)
CALCIUM SERPL-MCNC: 9.5 MG/DL (ref 8.3–10.6)
CHLORIDE BLD-SCNC: 104 MMOL/L (ref 99–110)
CO2: 21 MMOL/L (ref 21–32)
CREAT SERPL-MCNC: 0.5 MG/DL (ref 0.6–1.1)
D DIMER: <200 NG/ML(DDU)
DIFFERENTIAL TYPE: ABNORMAL
DIFFERENTIAL TYPE: ABNORMAL
EOSINOPHILS ABSOLUTE: 0.1 K/CU MM
EOSINOPHILS ABSOLUTE: 0.2 K/CU MM
EOSINOPHILS RELATIVE PERCENT: 1.3 % (ref 0–3)
EOSINOPHILS RELATIVE PERCENT: 1.5 % (ref 0–3)
ESTIMATED AVERAGE GLUCOSE: 148 MG/DL
GFR SERPL CREATININE-BSD FRML MDRD: >60 ML/MIN/1.73M2
GLUCOSE BLD-MCNC: 108 MG/DL (ref 70–99)
GLUCOSE BLD-MCNC: 136 MG/DL (ref 70–99)
GLUCOSE BLD-MCNC: 88 MG/DL (ref 70–99)
GLUCOSE SERPL-MCNC: 100 MG/DL (ref 70–99)
HBA1C MFR BLD: 6.8 % (ref 4.2–6.3)
HCT VFR BLD CALC: 44.8 % (ref 37–47)
HCT VFR BLD CALC: 46.3 % (ref 37–47)
HEMOGLOBIN: 15 GM/DL (ref 12.5–16)
HEMOGLOBIN: 15.4 GM/DL (ref 12.5–16)
IMMATURE NEUTROPHIL %: 0.3 % (ref 0–0.43)
IMMATURE NEUTROPHIL %: 0.3 % (ref 0–0.43)
LYMPHOCYTES ABSOLUTE: 2.1 K/CU MM
LYMPHOCYTES ABSOLUTE: 2.6 K/CU MM
LYMPHOCYTES RELATIVE PERCENT: 19.4 % (ref 24–44)
LYMPHOCYTES RELATIVE PERCENT: 23.9 % (ref 24–44)
MCH RBC QN AUTO: 29.2 PG (ref 27–31)
MCH RBC QN AUTO: 29.3 PG (ref 27–31)
MCHC RBC AUTO-ENTMCNC: 33.3 % (ref 32–36)
MCHC RBC AUTO-ENTMCNC: 33.5 % (ref 32–36)
MCV RBC AUTO: 87.3 FL (ref 78–100)
MCV RBC AUTO: 88.2 FL (ref 78–100)
MONOCYTES ABSOLUTE: 0.7 K/CU MM
MONOCYTES ABSOLUTE: 0.9 K/CU MM
MONOCYTES RELATIVE PERCENT: 6.3 % (ref 0–4)
MONOCYTES RELATIVE PERCENT: 7.8 % (ref 0–4)
NUCLEATED RBC %: 0 %
NUCLEATED RBC %: 0 %
PDW BLD-RTO: 13 % (ref 11.7–14.9)
PDW BLD-RTO: 13.1 % (ref 11.7–14.9)
PLATELET # BLD: 181 K/CU MM (ref 140–440)
PLATELET # BLD: 194 K/CU MM (ref 140–440)
PMV BLD AUTO: 11.9 FL (ref 7.5–11.1)
PMV BLD AUTO: 12.2 FL (ref 7.5–11.1)
POTASSIUM SERPL-SCNC: 4 MMOL/L (ref 3.5–5.1)
RBC # BLD: 5.13 M/CU MM (ref 4.2–5.4)
RBC # BLD: 5.25 M/CU MM (ref 4.2–5.4)
SEGMENTED NEUTROPHILS ABSOLUTE COUNT: 7.2 K/CU MM
SEGMENTED NEUTROPHILS ABSOLUTE COUNT: 7.8 K/CU MM
SEGMENTED NEUTROPHILS RELATIVE PERCENT: 66 % (ref 36–66)
SEGMENTED NEUTROPHILS RELATIVE PERCENT: 72.2 % (ref 36–66)
SODIUM BLD-SCNC: 138 MMOL/L (ref 135–145)
TOTAL IMMATURE NEUTOROPHIL: 0.03 K/CU MM
TOTAL IMMATURE NEUTOROPHIL: 0.03 K/CU MM
TOTAL NUCLEATED RBC: 0 K/CU MM
TOTAL NUCLEATED RBC: 0 K/CU MM
TOTAL PROTEIN: 7 GM/DL (ref 6.4–8.2)
TROPONIN T: <0.01 NG/ML
TROPONIN T: <0.01 NG/ML
WBC # BLD: 10.8 K/CU MM (ref 4–10.5)
WBC # BLD: 10.8 K/CU MM (ref 4–10.5)

## 2023-02-04 PROCEDURE — G0378 HOSPITAL OBSERVATION PER HR: HCPCS

## 2023-02-04 PROCEDURE — 83036 HEMOGLOBIN GLYCOSYLATED A1C: CPT

## 2023-02-04 PROCEDURE — 85025 COMPLETE CBC W/AUTO DIFF WBC: CPT

## 2023-02-04 PROCEDURE — 6370000000 HC RX 637 (ALT 250 FOR IP): Performed by: NURSE PRACTITIONER

## 2023-02-04 PROCEDURE — 36415 COLL VENOUS BLD VENIPUNCTURE: CPT

## 2023-02-04 PROCEDURE — 6370000000 HC RX 637 (ALT 250 FOR IP): Performed by: INTERNAL MEDICINE

## 2023-02-04 PROCEDURE — 84484 ASSAY OF TROPONIN QUANT: CPT

## 2023-02-04 PROCEDURE — 93005 ELECTROCARDIOGRAM TRACING: CPT | Performed by: NURSE PRACTITIONER

## 2023-02-04 PROCEDURE — 96372 THER/PROPH/DIAG INJ SC/IM: CPT

## 2023-02-04 PROCEDURE — 99285 EMERGENCY DEPT VISIT HI MDM: CPT

## 2023-02-04 PROCEDURE — 6370000000 HC RX 637 (ALT 250 FOR IP): Performed by: EMERGENCY MEDICINE

## 2023-02-04 PROCEDURE — 80053 COMPREHEN METABOLIC PANEL: CPT

## 2023-02-04 PROCEDURE — 71045 X-RAY EXAM CHEST 1 VIEW: CPT

## 2023-02-04 PROCEDURE — 6360000002 HC RX W HCPCS: Performed by: NURSE PRACTITIONER

## 2023-02-04 PROCEDURE — 82962 GLUCOSE BLOOD TEST: CPT

## 2023-02-04 PROCEDURE — 99222 1ST HOSP IP/OBS MODERATE 55: CPT | Performed by: INTERNAL MEDICINE

## 2023-02-04 PROCEDURE — 85379 FIBRIN DEGRADATION QUANT: CPT

## 2023-02-04 RX ORDER — ROPINIROLE 0.25 MG/1
0.75 TABLET, FILM COATED ORAL NIGHTLY
Status: DISCONTINUED | OUTPATIENT
Start: 2023-02-04 | End: 2023-02-04

## 2023-02-04 RX ORDER — NICOTINE 21 MG/24HR
1 PATCH, TRANSDERMAL 24 HOURS TRANSDERMAL DAILY
Status: DISCONTINUED | OUTPATIENT
Start: 2023-02-04 | End: 2023-02-06 | Stop reason: HOSPADM

## 2023-02-04 RX ORDER — ENOXAPARIN SODIUM 100 MG/ML
40 INJECTION SUBCUTANEOUS DAILY
Status: DISCONTINUED | OUTPATIENT
Start: 2023-02-04 | End: 2023-02-04 | Stop reason: DRUGHIGH

## 2023-02-04 RX ORDER — HYDROXYZINE HYDROCHLORIDE 25 MG/1
50 TABLET, FILM COATED ORAL ONCE
Status: DISCONTINUED | OUTPATIENT
Start: 2023-02-04 | End: 2023-02-06 | Stop reason: HOSPADM

## 2023-02-04 RX ORDER — SODIUM CHLORIDE 0.9 % (FLUSH) 0.9 %
5-40 SYRINGE (ML) INJECTION EVERY 12 HOURS SCHEDULED
Status: DISCONTINUED | OUTPATIENT
Start: 2023-02-04 | End: 2023-02-06 | Stop reason: HOSPADM

## 2023-02-04 RX ORDER — ROPINIROLE 1 MG/1
1 TABLET, FILM COATED ORAL NIGHTLY
Status: DISCONTINUED | OUTPATIENT
Start: 2023-02-04 | End: 2023-02-06 | Stop reason: HOSPADM

## 2023-02-04 RX ORDER — HYDROXYZINE HYDROCHLORIDE 25 MG/1
25 TABLET, FILM COATED ORAL EVERY 6 HOURS PRN
Status: ON HOLD | COMMUNITY
Start: 2023-01-31 | End: 2023-02-06

## 2023-02-04 RX ORDER — FLUOXETINE HYDROCHLORIDE 20 MG/1
1 CAPSULE ORAL DAILY
COMMUNITY
Start: 2023-01-10

## 2023-02-04 RX ORDER — FLUOXETINE 10 MG/1
40 CAPSULE ORAL NIGHTLY
Status: DISCONTINUED | OUTPATIENT
Start: 2023-02-04 | End: 2023-02-04

## 2023-02-04 RX ORDER — NITROGLYCERIN 0.4 MG/1
0.4 TABLET SUBLINGUAL EVERY 5 MIN PRN
Status: DISCONTINUED | OUTPATIENT
Start: 2023-02-04 | End: 2023-02-06 | Stop reason: HOSPADM

## 2023-02-04 RX ORDER — ONDANSETRON 2 MG/ML
4 INJECTION INTRAMUSCULAR; INTRAVENOUS EVERY 6 HOURS PRN
Status: DISCONTINUED | OUTPATIENT
Start: 2023-02-04 | End: 2023-02-06 | Stop reason: HOSPADM

## 2023-02-04 RX ORDER — ACETAMINOPHEN 650 MG/1
650 SUPPOSITORY RECTAL EVERY 6 HOURS PRN
Status: DISCONTINUED | OUTPATIENT
Start: 2023-02-04 | End: 2023-02-06 | Stop reason: HOSPADM

## 2023-02-04 RX ORDER — SODIUM CHLORIDE 0.9 % (FLUSH) 0.9 %
5-40 SYRINGE (ML) INJECTION PRN
Status: DISCONTINUED | OUTPATIENT
Start: 2023-02-04 | End: 2023-02-06 | Stop reason: HOSPADM

## 2023-02-04 RX ORDER — SODIUM CHLORIDE 9 MG/ML
INJECTION, SOLUTION INTRAVENOUS PRN
Status: DISCONTINUED | OUTPATIENT
Start: 2023-02-04 | End: 2023-02-06 | Stop reason: HOSPADM

## 2023-02-04 RX ORDER — DEXTROSE MONOHYDRATE 100 MG/ML
INJECTION, SOLUTION INTRAVENOUS CONTINUOUS PRN
Status: DISCONTINUED | OUTPATIENT
Start: 2023-02-04 | End: 2023-02-06 | Stop reason: HOSPADM

## 2023-02-04 RX ORDER — ASPIRIN 81 MG/1
81 TABLET ORAL DAILY
Status: DISCONTINUED | OUTPATIENT
Start: 2023-02-05 | End: 2023-02-06 | Stop reason: HOSPADM

## 2023-02-04 RX ORDER — ONDANSETRON 4 MG/1
4 TABLET, ORALLY DISINTEGRATING ORAL EVERY 8 HOURS PRN
Status: DISCONTINUED | OUTPATIENT
Start: 2023-02-04 | End: 2023-02-06 | Stop reason: HOSPADM

## 2023-02-04 RX ORDER — HYDRALAZINE HYDROCHLORIDE 25 MG/1
25 TABLET, FILM COATED ORAL 4 TIMES DAILY PRN
COMMUNITY

## 2023-02-04 RX ORDER — TRAZODONE HYDROCHLORIDE 50 MG/1
1 TABLET ORAL
COMMUNITY
Start: 2023-01-05

## 2023-02-04 RX ORDER — ATORVASTATIN CALCIUM 40 MG/1
40 TABLET, FILM COATED ORAL NIGHTLY
Status: DISCONTINUED | OUTPATIENT
Start: 2023-02-04 | End: 2023-02-06 | Stop reason: HOSPADM

## 2023-02-04 RX ORDER — ASPIRIN 81 MG/1
324 TABLET, CHEWABLE ORAL ONCE
Status: COMPLETED | OUTPATIENT
Start: 2023-02-04 | End: 2023-02-04

## 2023-02-04 RX ORDER — LEVOTHYROXINE SODIUM 0.05 MG/1
50 TABLET ORAL NIGHTLY
Status: DISCONTINUED | OUTPATIENT
Start: 2023-02-04 | End: 2023-02-06 | Stop reason: HOSPADM

## 2023-02-04 RX ORDER — ENOXAPARIN SODIUM 100 MG/ML
30 INJECTION SUBCUTANEOUS 2 TIMES DAILY
Status: DISCONTINUED | OUTPATIENT
Start: 2023-02-04 | End: 2023-02-06 | Stop reason: HOSPADM

## 2023-02-04 RX ORDER — INSULIN LISPRO 100 [IU]/ML
0-4 INJECTION, SOLUTION INTRAVENOUS; SUBCUTANEOUS NIGHTLY
Status: DISCONTINUED | OUTPATIENT
Start: 2023-02-04 | End: 2023-02-06 | Stop reason: HOSPADM

## 2023-02-04 RX ORDER — ACETAMINOPHEN 325 MG/1
650 TABLET ORAL EVERY 6 HOURS PRN
Status: DISCONTINUED | OUTPATIENT
Start: 2023-02-04 | End: 2023-02-06 | Stop reason: HOSPADM

## 2023-02-04 RX ORDER — POLYETHYLENE GLYCOL 3350 17 G/17G
17 POWDER, FOR SOLUTION ORAL DAILY PRN
Status: DISCONTINUED | OUTPATIENT
Start: 2023-02-04 | End: 2023-02-06 | Stop reason: HOSPADM

## 2023-02-04 RX ORDER — FLUOXETINE 10 MG/1
20 CAPSULE ORAL NIGHTLY
Status: DISCONTINUED | OUTPATIENT
Start: 2023-02-04 | End: 2023-02-06 | Stop reason: HOSPADM

## 2023-02-04 RX ORDER — METOPROLOL SUCCINATE 25 MG/1
25 TABLET, EXTENDED RELEASE ORAL DAILY
Status: DISCONTINUED | OUTPATIENT
Start: 2023-02-04 | End: 2023-02-06 | Stop reason: HOSPADM

## 2023-02-04 RX ORDER — INSULIN LISPRO 100 [IU]/ML
0-4 INJECTION, SOLUTION INTRAVENOUS; SUBCUTANEOUS
Status: DISCONTINUED | OUTPATIENT
Start: 2023-02-04 | End: 2023-02-06 | Stop reason: HOSPADM

## 2023-02-04 RX ADMIN — NITROGLYCERIN 1 INCH: 20 OINTMENT TOPICAL at 17:58

## 2023-02-04 RX ADMIN — NITROGLYCERIN 0.4 MG: 0.4 TABLET, ORALLY DISINTEGRATING SUBLINGUAL at 10:34

## 2023-02-04 RX ADMIN — NITROGLYCERIN 1 INCH: 20 OINTMENT TOPICAL at 11:21

## 2023-02-04 RX ADMIN — ROPINIROLE HYDROCHLORIDE 1 MG: 1 TABLET, FILM COATED ORAL at 22:15

## 2023-02-04 RX ADMIN — ENOXAPARIN SODIUM 30 MG: 100 INJECTION SUBCUTANEOUS at 22:18

## 2023-02-04 RX ADMIN — METOPROLOL SUCCINATE 25 MG: 25 TABLET, EXTENDED RELEASE ORAL at 16:44

## 2023-02-04 RX ADMIN — LEVOTHYROXINE SODIUM 50 MCG: 0.05 TABLET ORAL at 22:15

## 2023-02-04 RX ADMIN — ENOXAPARIN SODIUM 30 MG: 100 INJECTION SUBCUTANEOUS at 15:09

## 2023-02-04 RX ADMIN — FLUOXETINE 20 MG: 10 CAPSULE ORAL at 22:15

## 2023-02-04 RX ADMIN — ASPIRIN 324 MG: 81 TABLET, CHEWABLE ORAL at 10:20

## 2023-02-04 RX ADMIN — ATORVASTATIN CALCIUM 40 MG: 40 TABLET, FILM COATED ORAL at 22:15

## 2023-02-04 ASSESSMENT — PAIN SCALES - GENERAL
PAINLEVEL_OUTOF10: 3
PAINLEVEL_OUTOF10: 0
PAINLEVEL_OUTOF10: 0

## 2023-02-04 ASSESSMENT — ENCOUNTER SYMPTOMS
BACK PAIN: 0
NAUSEA: 0
VOMITING: 0
STRIDOR: 0
SHORTNESS OF BREATH: 0
EYE REDNESS: 0
ABDOMINAL PAIN: 0
CHEST TIGHTNESS: 1
SORE THROAT: 0
DIARRHEA: 0
WHEEZING: 0
COUGH: 0

## 2023-02-04 ASSESSMENT — PAIN DESCRIPTION - DIRECTION: RADIATING_TOWARDS: LEFT ARM

## 2023-02-04 ASSESSMENT — PAIN DESCRIPTION - LOCATION: LOCATION: CHEST;ARM

## 2023-02-04 ASSESSMENT — PAIN DESCRIPTION - PAIN TYPE: TYPE: ACUTE PAIN

## 2023-02-04 ASSESSMENT — HEART SCORE: ECG: 0

## 2023-02-04 ASSESSMENT — PAIN DESCRIPTION - FREQUENCY: FREQUENCY: CONTINUOUS

## 2023-02-04 ASSESSMENT — PAIN DESCRIPTION - ORIENTATION: ORIENTATION: LEFT

## 2023-02-04 ASSESSMENT — PAIN - FUNCTIONAL ASSESSMENT: PAIN_FUNCTIONAL_ASSESSMENT: 0-10

## 2023-02-04 NOTE — H&P
V2.0  History and Physical      Name:  Lam Luo /Age/Sex: 1970  (45 y.o. female)   MRN & CSN:  [id number] & [id number] Encounter Date/Time: 2023 12:34 PM EST   Location:  ED19/ED-19 PCP: González Womack, [address] Day: 1    Assessment and Plan:   Lam Luo is a 45 y.o. female with a pmh of NIDDM, former smoker who presents with Chest pain    Hospital Problems             Last Modified POA    * (Principal) Chest pain 2023 Yes         Chest pain r/o ACS  Placed in observation  Initial troponin less than 0.010, every 3 hours x2              Telemetry monitoring    Chest x-ray reviewed, negative for acute cardiopulmonary process              EKG reviewed, normal sinus rhythm, rate 60. No evidence of acute ST elevation or depression . Repeat EKG in AM              Cardiology consult. Dr Ibis Cordova on call              We will continue home aspirin, initiate Lipitor,   Check lipid panel in am     Non-insulin-dependent type 2 diabetes mellitus    sliding scale with hypoglycemia protocol   -Hold home oral antihyperglycemics, check hemoglobin A1c   Reviewed with patient sugar from regular soda more harmful than caffeine and moderate caffeine intake is ok. Discussed that Diet soda/ sugar free soda in moderation or coffee  (1-2 cups per day) would be okay as multiple drastic lifestyle changes at once such as quitting caffeine and smoking at the same time are difficult to maintain. Patient voiced understanding     Former smoker   27-pack-year history, recently quit smoking   Continued cessation encouraged, nicotine patch and lozenges ordered    Depression  Anxiety   Recently initiated on fluoxetine with    Atarax added as needed as needed. Continue current medications    Hypothyroidism   Continue levothyroxine    Obesity  Body mass index is 43.4 kg/m².   Lifestyle modifications advised   Disposition:   Current Living situation: Home  Expected Disposition: Same   Estimated D/C: 2 days Diet No diet orders on file   DVT Prophylaxis [x] Lovenox, []  Heparin, [] SCDs, [] Ambulation,  [] Eliquis, [] Xarelto, [] Coumadin   Code Status Prior   Surrogate Decision Maker/ POA      History from:     patient, electronic medical record    History of Present Illness:     Chief Complaint: chest pain  Lyn Chew is a 45 y.o. female with pmh of recently diagnosed non-insulin-dependent type 2 diabetes, former smoker, obesity, former smoker who presents with new onset chest pain that started today with exertion. Patient states she got up to go to the bathroom today and had new onset of left-sided chest pain radiating to her neck, left arm. It happened this morning after she ate breakfast.  Lasted a couple minutes after she reports it was intermittent and did not go away after she sat down and rested. Prompting her to call EMS and present to the ED for evaluation. It was improved with the nitro patch in the ED. Patient states in the last months she has quit smoking and also quit caffeine from sodas. States she was drinking 3 -32 ounce regular sodas up until 1 month ago prior to her diabetes diagnosis. States she quit caffeine and smoking cold turkey. States she has lost 22 pounds. Recently has started reintroducing some sugar-free/diet soda into her diet and she feels may be quitting caffeine and smoking at the same time or not a good idea. See above     Review of Systems: Need 10 Elements   Review of Systems   Constitutional:  Negative for activity change, appetite change, diaphoresis and fatigue. HENT:  Negative for congestion and postnasal drip. Eyes:  Negative for redness and visual disturbance. Respiratory:  Negative for cough and shortness of breath. Cardiovascular:  Positive for chest pain. Negative for palpitations. Gastrointestinal:  Negative for diarrhea, nausea and vomiting. Endocrine: Negative for cold intolerance and heat intolerance.    Genitourinary:  Negative for difficulty urinating and dysuria. Musculoskeletal:  Negative for joint swelling and neck pain. Skin: Negative. Neurological:  Negative for dizziness and speech difficulty. Psychiatric/Behavioral:  Negative for agitation and confusion. The patient is nervous/anxious. Objective:   No intake or output data in the 24 hours ending 02/04/23 1319   Vitals:   Vitals:    02/04/23 1130 02/04/23 1150 02/04/23 1200 02/04/23 1300   BP: (!) 114/58 (!) 108/51 (!) 112/46 114/68   Pulse: 73 64 64 67   Resp: 23 20 17 23   Temp:    97.9 °F (36.6 °C)   TempSrc:    Oral   SpO2: 95% 94% 94% 95%   Weight:       Height:           Medications Prior to Admission     Prior to Admission medications    Medication Sig Start Date End Date Taking? Authorizing Provider   hydrALAZINE (APRESOLINE) 25 MG tablet Take 25 mg by mouth 4 times daily as needed   Yes Historical Provider, MD   metFORMIN (GLUCOPHAGE) 500 MG tablet Take 500 mg by mouth 2 times daily (with meals)   Yes Historical Provider, MD   FLUoxetine (PROZAC) 20 MG capsule Take 1 capsule by mouth daily 1/10/23  Yes Historical Provider, MD   hydrOXYzine HCl (ATARAX) 25 MG tablet Take 25 mg by mouth every 6 hours as needed 1/31/23 2/5/23 Yes Historical Provider, MD   traZODone (DESYREL) 50 MG tablet 1 tablet 1/5/23  Yes Historical Provider, MD   aspirin 325 MG EC tablet Take 1 tablet by mouth 2 times daily 4/1/22   Ritika Boland PA-C   rOPINIRole (REQUIP) 0.25 MG tablet TAKE 3 TABLETS BY MOUTH 2 HOURS BEFORE BED 3/21/22   Ayah Chopra MD   nicotine (NICODERM CQ) 21 MG/24HR Place 1 patch onto the skin daily 2/22/22   Prakash Jett MD   nicotine polacrilex (NICORETTE) 4 MG gum Take 1 each by mouth every hour as needed for Smoking cessation 2/22/22   Prakash Jett MD   gabapentin (NEURONTIN) 300 MG capsule 300 mg at bedtime.    Patient not taking: Reported on 2/4/2023 1/12/22   Historical Provider, MD   cyclobenzaprine (FLEXERIL) 10 MG tablet  1/12/22   Historical Provider, MD   FLUoxetine (PROZAC) 40 MG capsule at bedtime  12/28/21   Historical Provider, MD Sanchez Clarke strip test 2 - 3 times a day by Transdermal route as directed 12/28/21   Historical Provider, MD Lorenzana Delica Lancets [address] [facility] test [address] 12/28/21   Historical Provider, MD   Blood Glucose Monitoring Suppl ([address]) w/Device KIT USE AS DIRECTED 10/27/21   Historical Provider, MD   Alcohol Swabs ( STERILE ALCOHOL PREP) PADS take by Topical route every day 12/28/21   Historical Provider, MD   zolpidem (AMBIEN) 10 MG tablet Take 10 mg by mouth nightly. Patient not taking: Reported on 2/4/2023    Historical Provider, MD   Todd Eduard 2.5 MCG/ACT AERS inhaler  11/22/19   Historical Provider, MD   Cholecalciferol (VITAMIN D3) 25 MCG (1000 UT) TABS  10/11/19   Historical Provider, MD   levothyroxine (SYNTHROID) 50 MCG tablet at bedtime  10/17/19   Historical Provider, MD   meloxicam (MOBIC) 15 MG tablet  10/11/19   Historical Provider, MD       Physical Exam: Need 8 Elements   Physical Exam  Vitals and nursing note reviewed. Constitutional:       General: She is not in acute distress. Appearance: Normal appearance. HENT:      Head: Normocephalic. Nose: Nose normal.      Mouth/Throat:      Pharynx: Oropharynx is clear. Eyes:      Pupils: Pupils are equal, round, and reactive to light. Cardiovascular:      Rate and Rhythm: Normal rate and regular rhythm. Pulses: Normal pulses. Pulmonary:      Effort: Pulmonary effort is normal. No respiratory distress. Breath sounds: No wheezing or rhonchi. Abdominal:      General: Abdomen is flat. Bowel sounds are normal. There is no distension. Musculoskeletal:         General: Normal range of motion. Cervical back: Normal range of motion. Skin:     General: Skin is warm and dry. Capillary Refill: Capillary refill takes less than 2 seconds.    Neurological:      General: No focal deficit present. Mental Status: She is alert and oriented to person, place, and time. Psychiatric:         Mood and Affect: Mood normal.           Past Medical History:   PMHx   Past Medical History:   Diagnosis Date    Anxiety     COPD (chronic obstructive pulmonary disease) (HCC)     Hypoglycemia     history    Panic attacks     Primary osteoarthritis of right knee     Separation anxiety     TIA (transient ischemic attack)      PSHX:  has a past surgical history that includes Hysterectomy;  section; Tubal ligation; Cholecystectomy; Knee cartilage surgery; Total knee arthroplasty (Right, 2022); and Ovary removal.  Allergies: No Known Allergies  Fam HX:  family history includes Cancer in her father and mother. Soc HX:   Social History     Socioeconomic History    Marital status:    Tobacco Use    Smoking status: Every Day     Packs/day: 1.00     Years: 30.00     Pack years: 30.00     Types: Cigarettes    Smokeless tobacco: Never   Vaping Use    Vaping Use: Never used   Substance and Sexual Activity    Alcohol use: No     Comment: occasional    Drug use: Never    Sexual activity: Not Currently       Medications:   Medications:    Infusions:   PRN Meds: nitroGLYCERIN, 0.4 mg, Q5 Min PRN      Labs      CBC:   Recent Labs     23  1115   WBC 10.8*   HGB 15.0        BMP:    Recent Labs     23  1044      K 4.0      CO2 21   BUN 20   CREATININE 0.5*   GLUCOSE 100*     Hepatic:   Recent Labs     23  1044   AST 28   ALT 57*   BILITOT 0.6   ALKPHOS 73     Lipids: No results found for: CHOL, HDL, TRIG  Hemoglobin A1C: No results found for: LABA1C  TSH: No results found for: TSH  Troponin:   Lab Results   Component Value Date/Time    TROPONINT <0.010 2023 10:44 AM    TROPONINT <0.010 2023 11:18 AM    TROPONINT <0.010 2023 07:07 PM     Lactic Acid: No results for input(s): LACTA in the last 72 hours.   BNP: No results for input(s): PROBNP in the last 72 hours. UA:  Lab Results   Component Value Date/Time    NITRU NEGATIVE 01/29/2023 06:43 PM    NITRU NEGATIVE 06/08/2013 06:40 PM    COLORU YELLOW 01/29/2023 06:43 PM    WBCUA 3 01/29/2023 06:43 PM    RBCUA NONE SEEN 01/29/2023 06:43 PM    MUCUS RARE 02/16/2022 10:15 AM    TRICHOMONAS NONE SEEN 01/29/2023 06:43 PM    BACTERIA NEGATIVE 01/29/2023 06:43 PM    CLARITYU CLEAR 01/29/2023 06:43 PM    SPECGRAV 1.010 01/29/2023 06:43 PM    LEUKOCYTESUR NEGATIVE 01/29/2023 06:43 PM    UROBILINOGEN 0.2 01/29/2023 06:43 PM    BILIRUBINUR NEGATIVE 01/29/2023 06:43 PM    BLOODU TRACE 01/29/2023 06:43 PM    GLUCOSEU neg 06/08/2013 06:40 PM    GLUCOSEU NEGATIVE 06/08/2013 06:40 PM    KETUA NEGATIVE 01/29/2023 06:43 PM     Urine Cultures: No results found for: Sudeep Alarcon  Blood Cultures: No results found for: BC  No results found for: BLOODCULT2  Organism:   Lab Results   Component Value Date/Time    ORG MRSA 03/04/2013 12:45 AM       Imaging/Diagnostics Last 24 Hours   XR CHEST PORTABLE    Result Date: 2/4/2023  EXAMINATION: ONE XRAY VIEW OF THE CHEST 2/4/2023 10:00 am COMPARISON: 02/01/2023 HISTORY: ORDERING SYSTEM PROVIDED HISTORY: chest pain TECHNOLOGIST PROVIDED HISTORY: Reason for exam:->chest pain Reason for Exam: chest pain FINDINGS: The lungs are without acute focal process. There is no effusion or pneumothorax. The cardiomediastinal silhouette is stable. The osseous structures are stable. No acute process. XR CHEST PORTABLE    Result Date: 2/1/2023  EXAMINATION: ONE XRAY VIEW OF THE CHEST 2/1/2023 11:18 am COMPARISON: 02/16/2022 HISTORY: ORDERING SYSTEM PROVIDED HISTORY: Chest pain TECHNOLOGIST PROVIDED HISTORY: Reason for exam:->Chest pain Reason for Exam: chest pain FINDINGS: The heart size is enlarged. The pulmonary vasculature is mildly congested. No acute infiltrates are seen. No pneumothoraces are noted. 1. Cardiomegaly with mild vascular congestion.        Personally reviewed Lab Studies, Imaging, and discussed case with Dr Alice Diallo    Electronically signed by MERE Crum CNP on 2/4/2023 at 1:19 PM

## 2023-02-04 NOTE — CARE COORDINATION
CM - [place] 19- [facility] [place]  reviewed at this time, criteria supports the OBSERVATION  admission as it was put in .

## 2023-02-04 NOTE — ED NOTES
ED TO INPATIENT SBAR HANDOFF    Patient Name: Kyle Benitez   :  1970  45 y.o. MRN:  [id number]  Preferred Name  uTng Bruce  ED Room #:  ED19/ED-19  Family/Caregiver Present no   Restraints no   Sitter no   Sepsis Risk Score Sepsis Risk Score: 0.93    Situation  Code Status: Prior No additional code details. Allergies: Patient has no known allergies. Weight: Patient Vitals for the past 96 hrs (Last 3 readings):   Weight   23 0935 245 lb (111.1 kg)     Arrived from: home  Chief Complaint:   Chief Complaint   Patient presents with   [facility] Problem/Diagnosis:  Principal Problem:    Chest pain  Resolved Problems:    * No resolved hospital problems. *    Imaging:   XR CHEST PORTABLE   Final Result   No acute process.            Abnormal labs:   Abnormal Labs Reviewed   COMPREHENSIVE METABOLIC PANEL W/ REFLEX TO MG FOR LOW K - Abnormal; Notable for the following components:       Result Value    Creatinine 0.5 (*)     Glucose 100 (*)     ALT 57 (*)     All other components within normal limits   CBC WITH AUTO DIFFERENTIAL - Abnormal; Notable for the following components:    WBC 10.8 (*)     MPV 11.9 (*)     Segs Relative 72.2 (*)     Lymphocytes % 19.4 (*)     Monocytes % 6.3 (*)     All other components within normal limits     Critical values: no     Abnormal Assessment Findings: None    Background  History:   Past Medical History:   Diagnosis Date    Anxiety     COPD (chronic obstructive pulmonary disease) (HCC)     Hypoglycemia     history    Panic attacks     Primary osteoarthritis of right knee     Separation anxiety     TIA (transient ischemic attack)        Assessment    Vitals/MEWS: MEWS Score: 1  Level of Consciousness: Alert (0)   Vitals:    23 1110 23 1121 23 1130 23 1150   BP: (!) 108/52 (!) 108/52 (!) 114/58 (!) 108/51   Pulse: 75 65 73 64   Resp: 15  20   Temp:       TempSrc:       SpO2: 95% 96% 95% 94%   Weight:       Height: FiO2 (%):   O2 Flow Rate: O2 Device: None (Room air)    Cardiac Rhythm: ns  Pain Assessment: 0-10 [] Verbal [] Lindalee Lamer Scale  Pain Scale: Pain Assessment  Pain Assessment: 0-10  Pain Level: 3  Patient's Stated Pain Goal: 0 - No pain  Pain Location: Chest, Arm  Pain Orientation: Left  Pain Type: Acute pain  Pain Radiating Towards: left arm  Pain Frequency: Continuous  Last documented pain score (0-10 scale) Pain Level: 3  Last documented pain medication administered: 1100  Mental Status: oriented  NIH Score: NIH     C-SSRS: Risk of Suicide: No Risk  Bedside swallow:    Aryan Coma Scale (GCS): Aryan Coma Scale  Eye Opening: Spontaneous  Best Verbal Response: Oriented  Best Motor Response: Obeys commands  [place] Coma Scale Score: 15  Active LDA's:   Peripheral IV 02/04/23 Left Arm (Active)     PO Status: Regular  Pertinent or High Risk Medications/Drips: no   o If Yes, please provide details: n/a  Pending Blood Product Administration: no     You may also review the ED PT Care Timeline found under the Summary Nursing Index tab. Recommendation    Pending orders no  Plan for Discharge (if known):    Additional Comments: none   If any further questions, please call Sending RN at [phone]    Electronically signed by: Electronically signed by Leland Rivas RN on 2/4/2023 at 12:56 PM     Leland Rivas RN  02/04/23 1012

## 2023-02-04 NOTE — ED PROVIDER NOTES
[id number] ENCOUNTER        Pt Name: Barbara Encinas  MRN: [id number]  [place] 1970  Date of evaluation: 2/4/2023  Provider: MERE Kirby - CNP  PCP: Steve Orellana MD  Note Started: 9:39 AM EST     I have seen and evaluated this patient with my supervising physician Andrea Bledsoe DO. Triage CHIEF COMPLAINT       Chief Complaint   Patient presents with    Chest Pain     HISTORY OF PRESENT ILLNESS   (Location/Symptom, Timing/Onset, Context/Setting, Quality, Duration, Modifying Factors, Severity)  Note limiting factors. Chief Complaint: Chest pain    Barbara Encinas is a 45 y.o. female with history as noted below who presents to the emergency department with onset of left-sided neck pain radiating left arm. Onset was was this morning after she ate breakfast.  It lasted only a couple minutes however she reports it is intermittent and has experienced a couple times since onset. It is rated 5/10. No exacerbating or relieving factors. The pain does not increase with inspiration or movement. Also endorses some mild shortness of breath stating that she does not feel like she can get a good breath. She recently found out that she was diabetic and started metformin however she has been working hard to cut out her caffeine from sodas and also has quit smoking. Tomorrow will be 2 weeks. She states that she has lost 22 pounds. Nursing Notes were all reviewed and agreed with or any disagreements were addressed in the HPI. REVIEW OF SYSTEMS    (2-9 systems for level 4, 10 or more for level 5)     Review of Systems   Constitutional:  Negative for appetite change, fatigue and fever. HENT:  Negative for hearing loss and sore throat. Respiratory:  Positive for chest tightness. Negative for cough, shortness of breath, wheezing and stridor. Cardiovascular:  Positive for chest pain.  Negative for palpitations and leg swelling. Gastrointestinal:  Negative for abdominal pain, nausea and vomiting. Genitourinary:  Negative for dysuria. Musculoskeletal:  Negative for back pain. Skin:  Negative for pallor and rash. Neurological:  Negative for dizziness and syncope. Psychiatric/Behavioral:  Negative for confusion. PAST MEDICAL HISTORY     Past Medical History:   Diagnosis Date    Anxiety     COPD (chronic obstructive pulmonary disease) (HCC)     Hypoglycemia     history    Panic attacks     Primary osteoarthritis of right knee     Separation anxiety     TIA (transient ischemic attack)      SURGICAL HISTORY     Past Surgical History:   Procedure Laterality Date     SECTION      CHOLECYSTECTOMY      HYSTERECTOMY (CERVIX STATUS UNKNOWN)      KNEE CARTILAGE SURGERY      R knee     OVARY REMOVAL      TOTAL KNEE ARTHROPLASTY Right 2022    RIGHT KNEE TOTAL ARTHROPLASTY ROBOTIC performed by Lynda Quarles MD at [address]       Previous Medications    ALCOHOL SWABS ( STERILE ALCOHOL PREP) PADS    take by Topical route every day    ASPIRIN 325 MG EC TABLET    Take 1 tablet by mouth 2 times daily    BLOOD GLUCOSE MONITORING SUPPL (ONETOUCH VERIO FLEX SYSTEM) W/DEVICE KIT    USE AS DIRECTED    CHOLECALCIFEROL (VITAMIN D3) 25 MCG (1000 UT) TABS        CYCLOBENZAPRINE (FLEXERIL) 10 MG TABLET        FLUOXETINE (PROZAC) 40 MG CAPSULE    at bedtime     GABAPENTIN (NEURONTIN) 300 MG CAPSULE    300 mg at bedtime.      HYDRALAZINE (APRESOLINE) 25 MG TABLET    Take 25 mg by mouth 4 times daily as needed    LEVOTHYROXINE (SYNTHROID) 50 MCG TABLET    at bedtime     MELOXICAM (MOBIC) 15 MG TABLET        METFORMIN (GLUCOPHAGE) 500 MG TABLET    Take 500 mg by mouth 2 times daily (with meals)    NICOTINE (NICODERM CQ) 21 MG/24HR    Place 1 patch onto the skin daily    NICOTINE POLACRILEX (NICORETTE) 4 MG GUM    Take 1 each by mouth every hour as needed for Smoking cessation    ONETOUCH DELICA LANCETS 93H MISC    test UP TO THREE TIMES DAILY    ONETOUCH VERIO STRIP    test 2 - 3 times a day by Transdermal route as directed    ROPINIROLE (REQUIP) 0.25 MG TABLET    TAKE 3 TABLETS BY MOUTH 2 HOURS BEFORE BED    SPIRIVA RESPIMAT 2.5 MCG/ACT AERS INHALER        ZOLPIDEM (AMBIEN) 10 MG TABLET    Take 10 mg by mouth nightly. ALLERGIES     Patient has no known allergies. FAMILYHISTORY       Family History   Problem Relation Age of Onset    Cancer Mother     Cancer Father     Breast Cancer Neg Hx     Ovarian Cancer Neg Hx       SOCIAL HISTORY       Social History     Socioeconomic History    Marital status:    Tobacco Use    Smoking status: Every Day     Packs/day: 1.00     Years: 30.00     Pack years: 30.00     Types: Cigarettes    Smokeless tobacco: Never   Vaping Use    Vaping Use: Never used   Substance and Sexual Activity    Alcohol use: No     Comment: occasional    Drug use: Never    Sexual activity: Not Currently     SCREENINGS   Heart Score  History: 1  EC  Patient Age: 1  *Risk factors for Atherosclerotic disease: Cigarette smoking; Diabetes Mellitus; Hypercholesterolemia; Hypertension; Obesity  Risk Factors: 2  Troponin: 0  Heart Score Total: 4     PHYSICAL EXAM    (up to 7 for level 4, 8 or more for level 5)     ED Triage Vitals [23 0935]   BP Temp Temp Source Heart Rate Resp SpO2 Height Weight   118/63 97.7 °F (36.5 °C) Oral 70 16 93 % 5' 3\" (1.6 m) 245 lb (111.1 kg)       Physical Exam  Vitals and nursing note reviewed. Constitutional:       General: She is not in acute distress. Appearance: She is not ill-appearing or toxic-appearing. HENT:      Head: Normocephalic and atraumatic. Mouth/Throat:      Mouth: Mucous membranes are moist.   Cardiovascular:      Rate and Rhythm: Normal rate and regular rhythm. Pulses: Normal pulses. Heart sounds: Normal heart sounds. No murmur heard. No friction rub. No gallop. Pulmonary:      Effort: Pulmonary effort is normal. No respiratory distress. Breath sounds: Normal breath sounds. Abdominal:      General: There is no distension. Palpations: Abdomen is soft. Tenderness: There is no abdominal tenderness. Musculoskeletal:         General: Normal range of motion. Cervical back: Normal range of motion and neck supple. Skin:     General: Skin is warm and dry. Capillary Refill: Capillary refill takes less than 2 seconds. Neurological:      General: No focal deficit present. Mental Status: She is alert and oriented to person, place, and time. Psychiatric:         Mood and Affect: Mood normal.       DIAGNOSTIC RESULTS   LABS:  I have reviewed and interpreted all of the currently available lab results from this visit (if applicable) Only abnormal lab results are displayed. All other labs were within normal range or not returned as of this dictation. Labs Reviewed   COMPREHENSIVE METABOLIC PANEL W/ REFLEX TO MG FOR LOW K - Abnormal; Notable for the following components:       Result Value    Creatinine 0.5 (*)     Glucose 100 (*)     ALT 57 (*)     All other components within normal limits   CBC WITH AUTO DIFFERENTIAL - Abnormal; Notable for the following components:    WBC 10.8 (*)     MPV 11.9 (*)     Segs Relative 72.2 (*)     Lymphocytes % 19.4 (*)     Monocytes % 6.3 (*)     All other components within normal limits   D-DIMER, QUANTITATIVE   TROPONIN       Radiographs (if obtained):  [] The following radiograph was interpreted by myself in the absence of a radiologist:   [x] Radiologist's Report Reviewed:  XR CHEST PORTABLE   Final Result   No acute process.            Chart review shows recent radiograph(s):  XR CHEST PORTABLE    Result Date: 2/1/2023  EXAMINATION: ONE XRAY VIEW OF THE CHEST 2/1/2023 11:18 am COMPARISON: 02/16/2022 HISTORY: ORDERING SYSTEM PROVIDED HISTORY: Chest pain TECHNOLOGIST PROVIDED HISTORY: Reason for exam:->Chest pain Reason for Exam: chest pain FINDINGS: The heart size is enlarged. The pulmonary vasculature is mildly congested. No acute infiltrates are seen. No pneumothoraces are noted. 1. Cardiomegaly with mild vascular congestion. EKG: When ordered, EKG's are interpreted by the Emergency Department Physician in the absence of a cardiologist.  Please see their note for interpretation of EKG. PROCEDURES   Unless otherwise noted below, none     Procedures    CRITICAL CARE TIME   Na    CONSULTS:  None    EMERGENCY DEPARTMENT COURSE and DIFFERENTIAL DIAGNOSIS/MDM:   Vitals:    Vitals:    02/04/23 1110 02/04/23 1121 02/04/23 1130 02/04/23 1150   BP: (!) 108/52 (!) 108/52 (!) 114/58 (!) 108/51   Pulse: 75 65 73 64   Resp: 15 21 23 20   Temp:       TempSrc:       SpO2: 95% 96% 95% 94%   Weight:       Height:         Is this patient to be included in the SEP-1 Core Measure due to severe sepsis or septic shock? No   Exclusion criteria - the patient is NOT to be included for SEP-1 Core Measure due to: Infection is not suspected     This is an alert and oriented x4, 45 y.o. yo female who presents emergency department with left-sided chest pain rating to the left arm and shortness of breath. Patient has nonlabored respirations. Vital signs within acceptable parameters. Patient is afebrile and in no acute distress. Equal radial pulses bilaterally. Review of outside records show that the patient had a Cardiolite stress test and echocardiogram on February 2022. Normal LV function, normal EDV. Ejection fraction 58%. Chest Pain w/labs MDM  Given HPI, ROS and PE as noted above, with significant PMH for  HTN,  HLD, CAD, Obesity, DM, Smoking with chest pain concerning for possible ACS, NSTEMI or other cause.   Differential diagnosis includes but is not limited to MI, pericarditis, aortic dissection, pneumothorax, valvular disease, pulmonary HTN, pneumonia, pleuritis, GERD, PUD, biliary disease, pancreatitis, anxiety or musculoskeletal.        Acute CHF considered but clinically unlikely. Exam and history not consistent with pneumothorax or pneumonia as patient is afebrile and is without cough. Given chronicity and no radiation to back, low suspicion of dissection. Unlikely TAA as the pain is not ripping or tearing and no radiation to back. Low probability of PE given low Wells score as well as no tachycardia, no SOB, no recent immobilization, no recent surgery. Presentation not consistent with other acute, emergent causes of chest pain currently. Will obtain Labs including, but not limited to, D-dimer troponin and will give ASA, o2 as needed. EKG and CXR to evaluate for acute cardiopulmonary causes. Patient was given the following medications:  Medications   nitroGLYCERIN (NITROSTAT) SL tablet 0.4 mg (0.4 mg SubLINGual Given 2/4/23 1034)   aspirin chewable tablet 324 mg (324 mg Oral Given 2/4/23 1020)   nitroglycerin (NITRO-BID) 2 % ointment 1 inch (1 inch Topical Given 2/4/23 1121)     Chest Pain Reeval  EKG with/without overt s/o ischemia, or depression. Lab results with CBC wo leukocytosis, no severe anemia, no acute electrolyte abnormality and Troponin negative x1. D-dimer is within normal parameters lessening my concern of PE or DVT. Chest xray, per my review and per per radiologist interpretation, with no acute findings. No evidence of significant pneumothoraces, pneumonia, masses, or other acute findings. No new mediastinal widening noted. She was treated with sublingual nitroglycerin which did improve her chest discomfort. As a result Nitropaste is returned. Dr. Raya Caro discussed Beny Plasvikram Anup's ED presentation and ED course with hospitalist. Based on that discussion it was decided to admit Yolanda Son to their service for further evaluation and care. I completed a HEART PATHWAY to screen for Acute Coronary Syndrome (ACS) in this patient.  Admission for further workup considered.  Heart score 4 improvement of pain with NTG and multiple return visits to the emergency department pt with increase risk for ACS and this is consistent with my clinical intuition.   At this time the risk of sending the patient home is greater than the risk of hospitalization for ACS rule out.    FINAL IMPRESSION      1. Chest pain, unspecified type    2. Type 2 diabetes mellitus without complication, without long-term current use of insulin ([facility])        DISPOSITION/PLAN   DISPOSITION Decision To Admit 02/04/2023 12:00:23 PM    PATIENT REFERREDTO:  No follow-up provider specified.    DISCHARGE MEDICATIONS:  New Prescriptions    No medications on file     DISCONTINUED MEDICATIONS:  Discontinued Medications    No medications on file     Comment: Please note this report has been produced using speech recognition software and may contain errors related to that system including errors in grammar, punctuation, and spelling, as well as words and phrases that may be inappropriate. If there are any questions or concerns please feel free to contact the dictating provider for clarification.    MERE Kiser CNP (electronically signed)      MERE Kiser CNP  02/04/23 153

## 2023-02-04 NOTE — ED TRIAGE NOTES
Patient started having chest pain around 0830a. Pain radiates down left arm. Had asa and nitro which helped the pain.  Still feeling fluttering in chest.

## 2023-02-04 NOTE — PROGRESS NOTES
LOVENOX PROPHYLAXIS EVALUATION    Wt Readings from Last 3 Encounters:   02/04/23 245 lb (111.1 kg)   02/01/23 245 lb (111.1 kg)   09/18/22 267 lb 12.8 oz (121.5 kg)       Estimated Creatinine Clearance: 154 mL/min (A) (based on SCr of 0.5 mg/dL (L)).   Recent Labs     02/04/23  1044 02/04/23  1115   BUN 20  --    CREATININE 0.5*  --    PLT  --  181   HGB  --  15.0   HCT  --  44.8       Weight Range: 101-150.9 kg    CRCL = 30 or greater    50.9 kg   and below     .9  kg   101-150.9 kg   151-174.9  kg   175 kg  or greater     30mg subq  daily     40mg subq daily    (or 30mg subq BID orthopedic)     30mg subq  BID   40mg subq  BID   60mg subq BID       Per P/T protocol for appropriate subq anticoagulation by weight and CRCL change to:    Enoxaparin 30mg subq BID      Minal Dumont RPH  1:52 PM  02/04/23

## 2023-02-04 NOTE — CONSULTS
CARDIOLOGY CONSULT NOTE   Reason for consultation: Chest pain    Referring physician:  Joellen Mayer MD     Primary care physician: González Womack MD      Chief Complaints :  Chief Complaint   Patient presents with    Chest Pain        History of present illness:Cheryl is a 45 y. o.year old female who is admitted with chest pain. Patient states that she went to the bathroom and came back and had sharp chest discomfort which was radiating to her left upper arm. She also became short of breath. Ambulance was called. Her symptoms improved after she was given sublingual nitroglycerin. She states that she never had this type of pain before. Patient at baseline has diabetes and active smoker with 27-pack-year history of smoking. Of note patient has been going in and out of several emergency room's in past few weeks for different reasons including fatigue, feeling unwell, feeling depressed. Past medical history:    has a past medical history of Anxiety, COPD (chronic obstructive pulmonary disease) ([address] Hypoglycemia, Panic attacks, Primary osteoarthritis of right knee, Separation anxiety, and TIA (transient ischemic attack). Past surgical history:   has a past surgical history that includes Hysterectomy;  section; Tubal ligation; Cholecystectomy; Knee cartilage surgery; Total knee arthroplasty (Right, 2022); and Ovary removal.  Social History:   reports that she has been smoking cigarettes. She has a 30.00 pack-year smoking history. She has never used smokeless tobacco. She reports that she does not drink alcohol and does not use drugs.   Family history:   no family history of CAD, STROKE of DM at early age    No Known Allergies    nitroGLYCERIN (NITROSTAT) SL tablet 0.4 mg, Q5 Min PRN  sodium chloride flush 0.9 % injection 5-40 mL, 2 times per day  sodium chloride flush 0.9 % injection 5-40 mL, PRN  0.9 % sodium chloride infusion, PRN  ondansetron (ZOFRAN-ODT) disintegrating tablet 4 mg, Q8H PRN Or  ondansetron (ZOFRAN) injection 4 mg, Q6H PRN  acetaminophen (TYLENOL) tablet 650 mg, Q6H PRN   Or  acetaminophen (TYLENOL) suppository 650 mg, Q6H PRN  polyethylene glycol (GLYCOLAX) packet 17 g, Daily PRN  atorvastatin (LIPITOR) tablet 40 mg, Nightly  glucose chewable tablet 16 g, PRN  dextrose bolus 10% 125 mL, PRN   Or  dextrose bolus 10% 250 mL, PRN  glucagon (rDNA) injection 1 mg, PRN  dextrose 10 % infusion, Continuous PRN  nicotine (NICODERM CQ) 14 MG/24HR 1 patch, Daily  nitroglycerin (NITRO-BID) 2 % ointment 1 inch, 4 times per day  insulin lispro (HUMALOG) injection vial 0-4 Units, TID WC  insulin lispro (HUMALOG) injection vial 0-4 Units, Nightly  nicotine polacrilex (NICORETTE) gum 4 mg, Q1H PRN  levothyroxine (SYNTHROID) tablet 50 mcg, Nightly  enoxaparin Sodium (LOVENOX) injection 30 mg, BID  [START ON 2/5/2023] aspirin EC tablet 81 mg, Daily  FLUoxetine (PROZAC) capsule 20 mg, Nightly  rOPINIRole (REQUIP) tablet 1 mg, Nightly  metoprolol succinate (TOPROL XL) extended release tablet 25 mg, Daily      Current Facility-Administered Medications   Medication Dose Route Frequency Provider Last Rate Last Admin    nitroGLYCERIN (NITROSTAT) SL tablet 0.4 mg  0.4 mg SubLINGual Q5 Min PRN Sury Martinez DO   0.4 mg at 02/04/23 1034    sodium chloride flush 0.9 % injection 5-40 mL  5-40 mL IntraVENous 2 times per day MERE Garsia CNP        sodium chloride flush 0.9 % injection 5-40 mL  5-40 mL IntraVENous PRN MERE Garsia - CNP        0.9 % sodium chloride infusion   IntraVENous PRN MERE Garsia - LESLY        ondansetron (ZOFRAN-ODT) disintegrating tablet 4 mg  4 mg Oral Q8H PRN MERE Garsia CNP        Or    ondansetron (ZOFRAN) injection 4 mg  4 mg IntraVENous Q6H PRN Umm Mccain APRN - LESLY        acetaminophen (TYLENOL) tablet 650 mg  650 mg Oral Q6H PRN MERE Gaspar CNP        Or    acetaminophen (TYLENOL) suppository 650 mg  650 mg Rectal Q6H PRN MERE Garsia CNP        polyethylene glycol (GLYCOLAX) packet 17 g  17 g Oral Daily PRN MERE Garsia CNP        atorvastatin (LIPITOR) tablet 40 mg  40 mg Oral Nightly MERE Garsia CNP        glucose chewable tablet 16 g  4 tablet Oral PRN MERE Garsia CNP        dextrose bolus 10% 125 mL  125 mL IntraVENous PRN MERE Garsia CNP        Or    dextrose bolus 10% 250 mL  250 mL IntraVENous PRN MERE Garsia - CNP        glucagon (rDNA) injection 1 mg  1 mg SubCUTAneous PRN MERE Garsia CNP        dextrose 10 % infusion   IntraVENous Continuous PRN MERE Garsia CNP        nicotine (NICODERM CQ) 14 MG/24HR 1 patch  1 patch TransDERmal Daily MERE Garsia CNP        nitroglycerin (NITRO-BID) 2 % ointment 1 inch  1 inch Topical 4 times per day MERE Garsia CNP        insulin lispro (HUMALOG) injection vial 0-4 Units  0-4 Units SubCUTAneous TID WC MERE Garsia CNP        insulin lispro (HUMALOG) injection vial 0-4 Units  0-4 Units SubCUTAneous Nightly MERE Garsia CNP        nicotine polacrilex (NICORETTE) gum 4 mg  4 mg Oral Q1H PRN MERE Garsia CNP        levothyroxine (SYNTHROID) tablet 50 mcg  50 mcg Oral Nightly MERE Garsia CNP        enoxaparin Sodium (LOVENOX) injection 30 mg  30 mg SubCUTAneous BID MERE Garsia CNP        [START ON 2/5/2023] aspirin EC tablet 81 mg  81 mg Oral Daily MERE Garsia CNP        FLUoxetine (PROZAC) capsule 20 mg  20 mg Oral Nightly MERE Garsia CNP        rOPINIRole (REQUIP) tablet 1 mg  1 mg Oral Nightly Umm I Kalyn, APRN - CNP        metoprolol succinate (TOPROL XL) extended release tablet 25 mg  25 mg Oral Daily Faustina Nowak MD         Review of Systems:   Constitutional: No Fever or Weight Loss   Eyes: No Decreased Vision  ENT: No Headaches, Hearing Loss or Vertigo  Cardiovascular: As per HPI  Respiratory: As per HPI  Gastrointestinal: No abdominal pain, appetite loss, blood in stools, constipation, diarrhea or heartburn  Genitourinary: No dysuria, trouble voiding, or hematuria  Musculoskeletal:  No gait disturbance, weakness or joint complaints  Integumentary: No rash or pruritis  Neurological: No TIA or stroke symptoms  Psychiatric: No anxiety or depression  Endocrine: No malaise, fatigue or temperature intolerance  Hematologic/Lymphatic: No bleeding problems, blood clots or swollen lymph nodes  Allergic/Immunologic: No nasal congestion or hives  All systems negative except as marked. Physical Examination:    Vitals:    02/04/23 1345   BP: 101/67   Pulse: 73   Resp: 14   Temp: 97.5 °F (36.4 °C)   SpO2: 96%        General Appearance:  No distress, conversant    Constitutional:  No acute distress, Non-toxic appearance. HENT:  Normocephalic, Atraumatic,   Eyes:  PERRL, EOMI, Conjunctiva normal, No discharge. Respiratory:  No respiratory distress, No wheezing  Cardiovascular: S1, S2, no murmurs, gallops. JVD wnl  Abdomen /GI:   Soft, No tenderness   Genitourinary: No costovertebral angle tenderness   Musculoskeletal:  No edema, no tenderness, no deformities. Integument:  Well hydrated, no rash   Neurologic:  Alert & oriented x 3, no focal deficits noted       Medical decision making and Data review:    Lab Review   Recent Labs     02/04/23  1115   WBC 10.8*   HGB 15.0   HCT 44.8         Recent Labs     02/04/23  1044      K 4.0      CO2 21   BUN 20   CREATININE 0.5*     Recent Labs     02/04/23  1044   AST 28   ALT 57*   BILITOT 0.6   ALKPHOS 73     Recent Labs     02/04/23  1044   TROPONINT <0.010       No results for input(s): PROBNP in the last 72 hours.   Lab Results   Component Value Date    INR 1.02 11/29/2012    PROTIME 11.2 11/29/2012       EKG: (reviewed by myself): Sinus rhythm without any active ischemic changes. ECHO:(reviewed by myself) pending        All labs, medications and tests reviewed by myself including data  from outside source , patient and available family . Continue all other medications of all above medical condition listed as is. Impression and Recommendations:    Chest pain at rest        45 y. o.year old with above medical history. At present we will continue with aspirin, atorvastatin and metoprolol XL 25 mg daily. We will trend her troponins and EKGs. If she remains chest pain-free and her troponins remain normal we will then do a nuclear stress test.  On the other hand if her troponins are getting elevated we will then consider doing coronary angiography. Thank you  much for consult and giving us the opportunity in contributing in the care of this patient. Please feel free to call me for any questions.        Roque Garcia MD, 2/4/2023 2:30 PM

## 2023-02-04 NOTE — PROGRESS NOTES
4 Eyes Skin Assessment     NAME:  Amy Pepe  YOB: 1970  MEDICAL RECORD NUMBER:  [id number]    The patient is being assessed for  Transfer to New Unit    I agree that One RN have performed a thorough Head to Toe Skin Assessment on the patient. ALL assessment sites listed below have been assessed. Areas assessed by both nurses:    Head, Face, Ears, Shoulders, Back, Chest, Arms, Elbows, Hands, Sacrum. Buttock, Coccyx, Ischium, Legs. Feet and Heels, and Other          Does the Patient have a Wound?  No noted wound(s)       Feliberto Prevention initiated by RN: Yes   Wound Care Orders initiated by RN: No    Pressure Injury (Stage 3,4, Unstageable, DTI, NWPT, and Complex wounds) if present place referral order by RN under : No    New and Established Ostomies, if present place, referral order under : No      Nurse 1 eSignature: Electronically signed by Dolores Moreno RN on 2/4/23 at 2:06 PM EST    **SHARE this note so that the co-signing nurse is able to place an eSignature**    Nurse 2 eSignature: Electronically signed by Massiel Li RN on 2/4/23 at 5:15 PM EST

## 2023-02-04 NOTE — ED PROVIDER NOTES
I personally saw Jassi Walton and performed a substantive portion of the visit including all aspects of the medical decision making. In brief, 61-year-old female who presents with concerns for left-sided chest discomfort. Describes a fluttering sensation with discomfort that radiates down her left arm. It was associate with dyspnea. No diaphoresis, nausea or vomiting. No associated back pain. Patient does have a history of TIA and a recent diagnosis of diabetes. She recently quit smoking and has lost a significant amount of weight over the past 2 months with lifestyle changes. Focused exam revealed the patient appears well-hydrated, well-nourished. Mucous membranes are moist. Speech is clear. Breathing is unlabored. Skin is dry. Mental status is normal. The patient moves all extremities and is without facial droop. No lower extremity tenderness, edema or asymmetry    EKG shows NSR @ 68. Normal axis with good R wave progression. No ST elevation or depression. No ectopy. No acute change from prior tracing. Repeat EKG obtained and is unchanged. ED course: Patient was given aspirin here. States her pain is a little improved following nitro per EMS. She does report some ongoing discomfort on my evaluation and a repeat EKG is obtained. This is unchanged. Pain relieved following additional nitro sublingual.  Nitropaste placed. Initial work-up here is reviewed and is reassuring. Heart score is 4 based on patient's age and comorbidities. Agree with plan for admission for further evaluation. Final Impression:  1. Chest pain, unspecified type    2. Type 2 diabetes mellitus without complication, without long-term current use of insulin ([facility] [address]      DISPOSITION Decision To Admit 02/04/2023 12:00:23 PM      All diagnostic, treatment, and disposition decisions were made by myself in conjunction with the advanced practice provider.     For all further details of the patient's emergency department visit, please see the advanced practice provider's documentation. Comment: Please note this report has been produced using speech recognition software and may contain errors related to that system including errors in grammar, punctuation, and spelling, as well as words and phrases that may be inappropriate. If there are any questions or concerns please feel free to contact the dictating provider for clarification.         [address] [place] [address],   02/04/23 7463

## 2023-02-05 LAB
ALBUMIN SERPL-MCNC: 4.4 GM/DL (ref 3.4–5)
ALP BLD-CCNC: 70 IU/L (ref 40–128)
ALT SERPL-CCNC: 55 U/L (ref 10–40)
ANION GAP SERPL CALCULATED.3IONS-SCNC: 9 MMOL/L (ref 4–16)
AST SERPL-CCNC: 26 IU/L (ref 15–37)
BILIRUB SERPL-MCNC: 0.7 MG/DL (ref 0–1)
BUN SERPL-MCNC: 16 MG/DL (ref 6–23)
CALCIUM SERPL-MCNC: 9.9 MG/DL (ref 8.3–10.6)
CHLORIDE BLD-SCNC: 105 MMOL/L (ref 99–110)
CO2: 29 MMOL/L (ref 21–32)
CREAT SERPL-MCNC: 0.9 MG/DL (ref 0.6–1.1)
GFR SERPL CREATININE-BSD FRML MDRD: >60 ML/MIN/1.73M2
GLUCOSE BLD-MCNC: 105 MG/DL (ref 70–99)
GLUCOSE BLD-MCNC: 113 MG/DL (ref 70–99)
GLUCOSE BLD-MCNC: 124 MG/DL (ref 70–99)
GLUCOSE BLD-MCNC: 93 MG/DL (ref 70–99)
GLUCOSE BLD-MCNC: 97 MG/DL (ref 70–99)
GLUCOSE SERPL-MCNC: 96 MG/DL (ref 70–99)
HCT VFR BLD CALC: 45.4 % (ref 37–47)
HEMOGLOBIN: 14.9 GM/DL (ref 12.5–16)
MCH RBC QN AUTO: 28.8 PG (ref 27–31)
MCHC RBC AUTO-ENTMCNC: 32.8 % (ref 32–36)
MCV RBC AUTO: 87.8 FL (ref 78–100)
PDW BLD-RTO: 13.1 % (ref 11.7–14.9)
PLATELET # BLD: 188 K/CU MM (ref 140–440)
PMV BLD AUTO: 12.3 FL (ref 7.5–11.1)
POTASSIUM SERPL-SCNC: 4.7 MMOL/L (ref 3.5–5.1)
RBC # BLD: 5.17 M/CU MM (ref 4.2–5.4)
SODIUM BLD-SCNC: 143 MMOL/L (ref 135–145)
TOTAL PROTEIN: 6 GM/DL (ref 6.4–8.2)
WBC # BLD: 9.4 K/CU MM (ref 4–10.5)

## 2023-02-05 PROCEDURE — 6370000000 HC RX 637 (ALT 250 FOR IP): Performed by: NURSE PRACTITIONER

## 2023-02-05 PROCEDURE — G0378 HOSPITAL OBSERVATION PER HR: HCPCS

## 2023-02-05 PROCEDURE — 6360000002 HC RX W HCPCS: Performed by: NURSE PRACTITIONER

## 2023-02-05 PROCEDURE — 94761 N-INVAS EAR/PLS OXIMETRY MLT: CPT

## 2023-02-05 PROCEDURE — 6370000000 HC RX 637 (ALT 250 FOR IP): Performed by: SURGERY

## 2023-02-05 PROCEDURE — APPNB180 APP NON BILLABLE TIME > 60 MINS: Performed by: NURSE PRACTITIONER

## 2023-02-05 PROCEDURE — 6370000000 HC RX 637 (ALT 250 FOR IP): Performed by: INTERNAL MEDICINE

## 2023-02-05 PROCEDURE — 82962 GLUCOSE BLOOD TEST: CPT

## 2023-02-05 PROCEDURE — 85027 COMPLETE CBC AUTOMATED: CPT

## 2023-02-05 PROCEDURE — 36415 COLL VENOUS BLD VENIPUNCTURE: CPT

## 2023-02-05 PROCEDURE — 80053 COMPREHEN METABOLIC PANEL: CPT

## 2023-02-05 PROCEDURE — 99232 SBSQ HOSP IP/OBS MODERATE 35: CPT | Performed by: INTERNAL MEDICINE

## 2023-02-05 PROCEDURE — 96372 THER/PROPH/DIAG INJ SC/IM: CPT

## 2023-02-05 PROCEDURE — 2580000003 HC RX 258: Performed by: NURSE PRACTITIONER

## 2023-02-05 PROCEDURE — 93005 ELECTROCARDIOGRAM TRACING: CPT | Performed by: NURSE PRACTITIONER

## 2023-02-05 RX ORDER — HYDROXYZINE PAMOATE 25 MG/1
25 CAPSULE ORAL 3 TIMES DAILY PRN
Status: DISCONTINUED | OUTPATIENT
Start: 2023-02-05 | End: 2023-02-06 | Stop reason: HOSPADM

## 2023-02-05 RX ADMIN — HYDROXYZINE PAMOATE 25 MG: 25 CAPSULE ORAL at 10:36

## 2023-02-05 RX ADMIN — NITROGLYCERIN 1 INCH: 20 OINTMENT TOPICAL at 06:36

## 2023-02-05 RX ADMIN — LEVOTHYROXINE SODIUM 50 MCG: 0.05 TABLET ORAL at 22:36

## 2023-02-05 RX ADMIN — FLUOXETINE 20 MG: 10 CAPSULE ORAL at 22:36

## 2023-02-05 RX ADMIN — ENOXAPARIN SODIUM 30 MG: 100 INJECTION SUBCUTANEOUS at 08:19

## 2023-02-05 RX ADMIN — NITROGLYCERIN 1 INCH: 20 OINTMENT TOPICAL at 22:36

## 2023-02-05 RX ADMIN — METOPROLOL SUCCINATE 25 MG: 25 TABLET, EXTENDED RELEASE ORAL at 08:19

## 2023-02-05 RX ADMIN — ROPINIROLE HYDROCHLORIDE 1 MG: 1 TABLET, FILM COATED ORAL at 22:36

## 2023-02-05 RX ADMIN — NITROGLYCERIN 1 INCH: 20 OINTMENT TOPICAL at 17:53

## 2023-02-05 RX ADMIN — NITROGLYCERIN 1 INCH: 20 OINTMENT TOPICAL at 12:41

## 2023-02-05 RX ADMIN — ATORVASTATIN CALCIUM 40 MG: 40 TABLET, FILM COATED ORAL at 22:36

## 2023-02-05 RX ADMIN — SODIUM CHLORIDE, PRESERVATIVE FREE 10 ML: 5 INJECTION INTRAVENOUS at 10:00

## 2023-02-05 RX ADMIN — ASPIRIN 81 MG: 81 TABLET, COATED ORAL at 08:19

## 2023-02-05 ASSESSMENT — PAIN SCALES - GENERAL
PAINLEVEL_OUTOF10: 0
PAINLEVEL_OUTOF10: 0

## 2023-02-05 NOTE — PROGRESS NOTES
V2.0  [facility] Hospitalist Progress Note      Name:  Leanne Kerns /Age/Sex: 1970  (45 y.o. female)   MRN & CSN:  [id number] & [id number] Encounter Date/Time: 2023 10:15 AM EST    Location:  [address] [place] [address] PCP: Jaclyn Farias MD       Hospital Day: 2    Assessment and Plan:   Leanne Kerns is a 45 y.o. female with CP      Plan:    Chest pain r/o ACS  Placed in observation  Initial troponin less than 0.010, every 3 hours x2              Telemetry monitoring               Chest x-ray reviewed, negative for acute cardiopulmonary process              EKG reviewed, normal sinus rhythm, rate 60. No evidence of acute ST elevation or depression . Repeat EKG in AM              Cardiology consult. Dr Abida Pride on call              We will continue home aspirin, initiate Lipitor,              Check lipid panel in am      Non-insulin-dependent type 2 diabetes mellitus               sliding scale with hypoglycemia protocol              -Hold home oral antihyperglycemics, check hemoglobin A1c              Reviewed with patient sugar from regular soda more harmful than caffeine and moderate caffeine intake is ok. Discussed that Diet soda/ sugar free soda in moderation or coffee  (1-2 cups per day) would be okay as multiple drastic lifestyle changes at once such as quitting caffeine and smoking at the same time are difficult to maintain. Patient voiced understanding      Former smoker              27-pack-year history, recently quit smoking              Continued cessation encouraged, nicotine patch and lozenges ordered     Depression  Anxiety              Recently initiated on fluoxetine with               Atarax added as needed as needed. Continue current medications     Hypothyroidism              Continue levothyroxine     Obesity  Body mass index is 43.4 kg/m².   Lifestyle modifications advised     Ppx: Lovenox  Dispo:     Subjective:     Chief Complaint: Chest Pain       Patient is without complaints. No adverse interval events. Review of Systems:    Review of Systems    10 point ROS negative except as stated above in \"subjective\" section    Objective: Intake/Output Summary (Last 24 hours) at 2/5/2023 1015  Last data filed at 2/5/2023 0025  Gross per 24 hour   Intake 480 ml   Output --   Net 480 ml        Vitals:   Vitals:    02/05/23 0815   BP: (!) 114/57   Pulse: 68   Resp: 16   Temp: 97.1 °F (36.2 °C)   SpO2: 96%       Physical Exam:     General: NAD  Eyes: EOMI  ENT: neck supple  Cardiovascular: Regular rate. Respiratory: Clear to auscultation  Gastrointestinal: Soft, non tender  Genitourinary: no suprapubic tenderness  Musculoskeletal: No edema  Skin: warm, dry  Neuro: Alert. Psych: Mood appropriate.      Medications:   Medications:    sodium chloride flush  5-40 mL IntraVENous 2 times per day    atorvastatin  40 mg Oral Nightly    nicotine  1 patch TransDERmal Daily    nitroglycerin  1 inch Topical 4 times per day    insulin lispro  0-4 Units SubCUTAneous TID WC    insulin lispro  0-4 Units SubCUTAneous Nightly    levothyroxine  50 mcg Oral Nightly    enoxaparin  30 mg SubCUTAneous BID    aspirin  81 mg Oral Daily    FLUoxetine  20 mg Oral Nightly    rOPINIRole  1 mg Oral Nightly    metoprolol succinate  25 mg Oral Daily    hydrOXYzine HCl  50 mg Oral Once      Infusions:    sodium chloride      dextrose       PRN Meds: hydrOXYzine pamoate, 25 mg, TID PRN  nitroGLYCERIN, 0.4 mg, Q5 Min PRN  sodium chloride flush, 5-40 mL, PRN  sodium chloride, , PRN  ondansetron, 4 mg, Q8H PRN   Or  ondansetron, 4 mg, Q6H PRN  acetaminophen, 650 mg, Q6H PRN   Or  acetaminophen, 650 mg, Q6H PRN  polyethylene glycol, 17 g, Daily PRN  glucose, 4 tablet, PRN  dextrose bolus, 125 mL, PRN   Or  dextrose bolus, 250 mL, PRN  glucagon (rDNA), 1 mg, PRN  dextrose, , Continuous PRN  nicotine polacrilex, 4 mg, Q1H PRN        Labs      Recent Results (from the past 24 hour(s))   Comprehensive Metabolic Panel w/ Reflex to MG    Collection Time: 02/04/23 10:44 AM   Result Value Ref Range    Sodium 138 135 - 145 MMOL/L    Potassium 4.0 3.5 - 5.1 MMOL/L    Chloride 104 99 - 110 mMol/L    CO2 21 21 - 32 MMOL/L    BUN 20 6 - 23 MG/DL    Creatinine 0.5 (L) 0.6 - 1.1 MG/DL    Est, Glom Filt Rate >60 >60 mL/min/1.73m2    Glucose 100 (H) 70 - 99 MG/DL    Calcium 9.5 8.3 - 10.6 MG/DL    Albumin 4.5 3.4 - 5.0 GM/DL    Total Protein 7.0 6.4 - 8.2 GM/DL    Total Bilirubin 0.6 0.0 - 1.0 MG/DL    ALT 57 (H) 10 - 40 U/L    AST 28 15 - 37 IU/L    Alkaline Phosphatase 73 40 - 129 IU/L    Anion Gap 13 4 - 16   D-Dimer, Quantitative    Collection Time: 02/04/23 10:44 AM   Result Value Ref Range    D-Dimer, Quant <200 <230 NG/mL(DDU)   Troponin    Collection Time: 02/04/23 10:44 AM   Result Value Ref Range    Troponin T <0.010 <0.01 NG/ML   CBC with Auto Differential    Collection Time: 02/04/23 11:15 AM   Result Value Ref Range    WBC 10.8 (H) 4.0 - 10.5 K/CU MM    RBC 5.13 4.2 - 5.4 M/CU MM    Hemoglobin 15.0 12.5 - 16.0 GM/DL    Hematocrit 44.8 37 - 47 %    MCV 87.3 78 - 100 FL    MCH 29.2 27 - 31 PG    MCHC 33.5 32.0 - 36.0 %    RDW 13.1 11.7 - 14.9 %    Platelets 141 071 - 581 K/CU MM    MPV 11.9 (H) 7.5 - 11.1 FL    Differential Type AUTOMATED DIFFERENTIAL     Segs Relative 72.2 (H) 36 - 66 %    Lymphocytes % 19.4 (L) 24 - 44 %    Monocytes % 6.3 (H) 0 - 4 %    Eosinophils % 1.3 0 - 3 %    Basophils % 0.5 0 - 1 %    Segs Absolute 7.8 K/CU MM    Lymphocytes Absolute 2.1 K/CU MM    Monocytes Absolute 0.7 K/CU MM    Eosinophils Absolute 0.1 K/CU MM    Basophils Absolute 0.1 K/CU MM    Nucleated RBC % 0.0 %    Total Nucleated RBC 0.0 K/CU MM    Total Immature Neutrophil 0.03 K/CU MM    Immature Neutrophil % 0.3 0 - 0.43 %   CBC with Auto Differential    Collection Time: 02/04/23  4:02 PM   Result Value Ref Range    WBC 10.8 (H) 4.0 - 10.5 K/CU MM    RBC 5.25 4.2 - 5.4 M/CU MM    Hemoglobin 15.4 12.5 - 16.0 GM/DL    Hematocrit 46.3 37 - 47 %    MCV 88.2 78 - 100 FL    MCH 29.3 27 - 31 PG    MCHC 33.3 32.0 - 36.0 %    RDW 13.0 11.7 - 14.9 %    Platelets 211 862 - 885 K/CU MM    MPV 12.2 (H) 7.5 - 11.1 FL    Differential Type AUTOMATED DIFFERENTIAL     Segs Relative 66.0 36 - 66 %    Lymphocytes % 23.9 (L) 24 - 44 %    Monocytes % 7.8 (H) 0 - 4 %    Eosinophils % 1.5 0 - 3 %    Basophils % 0.5 0 - 1 %    Segs Absolute 7.2 K/CU MM    Lymphocytes Absolute 2.6 K/CU MM    Monocytes Absolute 0.9 K/CU MM    Eosinophils Absolute 0.2 K/CU MM    Basophils Absolute 0.1 K/CU MM    Nucleated RBC % 0.0 %    Total Nucleated RBC 0.0 K/CU MM    Total Immature Neutrophil 0.03 K/CU MM    Immature Neutrophil % 0.3 0 - 0.43 %   Troponin    Collection Time: 02/04/23  4:02 PM   Result Value Ref Range    Troponin T <0.010 <0.01 NG/ML   Hemoglobin A1c    Collection Time: 02/04/23  4:02 PM   Result Value Ref Range    Hemoglobin A1C 6.8 (H) 4.2 - 6.3 %    eAG 148 mg/dL   POCT Glucose    Collection Time: 02/04/23  4:08 PM   Result Value Ref Range    POC Glucose 108 (H) 70 - 99 MG/DL   POCT Glucose    Collection Time: 02/04/23  9:06 PM   Result Value Ref Range    POC Glucose 88 70 - 99 MG/DL   POCT Glucose    Collection Time: 02/04/23 10:11 PM   Result Value Ref Range    POC Glucose 136 (H) 70 - 99 MG/DL   CBC    Collection Time: 02/05/23  4:19 AM   Result Value Ref Range    WBC 9.4 4.0 - 10.5 K/CU MM    RBC 5.17 4.2 - 5.4 M/CU MM    Hemoglobin 14.9 12.5 - 16.0 GM/DL    Hematocrit 45.4 37 - 47 %    MCV 87.8 78 - 100 FL    MCH 28.8 27 - 31 PG    MCHC 32.8 32.0 - 36.0 %    RDW 13.1 11.7 - 14.9 %    Platelets 930 651 - 375 K/CU MM    MPV 12.3 (H) 7.5 - 11.1 FL   Comprehensive Metabolic Panel w/ Reflex to MG    Collection Time: 02/05/23  4:19 AM   Result Value Ref Range    Sodium 143 135 - 145 MMOL/L    Potassium 4.7 3.5 - 5.1 MMOL/L    Chloride 105 99 - 110 mMol/L    CO2 29 21 - 32 MMOL/L    BUN 16 6 - 23 MG/DL    Creatinine 0.9 0.6 - 1.1 MG/DL    Est, Glom Filt Rate >60 >60 mL/min/1.73m2    Glucose 96 70 - 99 MG/DL    Calcium 9.9 8.3 - 10.6 MG/DL    Albumin 4.4 3.4 - 5.0 GM/DL    Total Protein 6.0 (L) 6.4 - 8.2 GM/DL    Total Bilirubin 0.7 0.0 - 1.0 MG/DL    ALT 55 (H) 10 - 40 U/L    AST 26 15 - 37 IU/L    Alkaline Phosphatase 70 40 - 128 IU/L    Anion Gap 9 4 - 16   POCT Glucose    Collection Time: 02/05/23  7:36 AM   Result Value Ref Range    POC Glucose 97 70 - 99 MG/DL   EKG 12 lead    Collection Time: 02/05/23  9:42 AM   Result Value Ref Range    Ventricular Rate 66 BPM    Atrial Rate 66 BPM    P-R Interval 154 ms    QRS Duration 92 ms    Q-T Interval 404 ms    QTc Calculation (Bazett) 423 ms    P Axis 48 degrees    R Axis 4 degrees    T Axis 0 degrees    Diagnosis       Normal sinus rhythm  Cannot rule out Anterior infarct , age undetermined  Abnormal ECG  When compared with ECG of 04-FEB-2023 11:07,  No significant change was found          Imaging/Diagnostics Last 24 Hours   XR CHEST PORTABLE    Result Date: 2/4/2023  EXAMINATION: ONE XRAY VIEW OF THE CHEST 2/4/2023 10:00 am COMPARISON: 02/01/2023 HISTORY: ORDERING SYSTEM PROVIDED HISTORY: chest pain TECHNOLOGIST PROVIDED HISTORY: Reason for exam:->chest pain Reason for Exam: chest pain FINDINGS: The lungs are without acute focal process. There is no effusion or pneumothorax. The cardiomediastinal silhouette is stable. The osseous structures are stable. No acute process.        Electronically signed by Efrain Kumar MD on 2/5/2023 at 10:15 AM

## 2023-02-05 NOTE — PROGRESS NOTES
Cardiology Progress Note     Today's Plan: stress test in AM    Admit Date:  2023    Consult reason/ Seen today for: Chest pain    Subjective and  Overnight Events:  denies chest pain since arrival to floor yesterday. She reports that she has had this intermittant pain for years and not found cause. NTG always relieves the pain. Assessment and Plan:  Chest pain: troponin's are negative. NM stress test 2022. Plan for carter scan in morning . Further plan of care based on results. COPD: per primary  Tobacco use: encouraged to stop  DVT prophylaxis if not contraindicated while in the hospital.      Telemetry Reviewed:   Sinus rhythm     ECHO :   echocardiogram      History of Presenting Illness:    Chief complain on admission : 45 y. o.year old who is admitted for  Chief Complaint   Patient presents with    Chest Pain        Past medical history:    has a past medical history of Anxiety, COPD (chronic obstructive pulmonary disease) ([address] Hypoglycemia, Panic attacks, Primary osteoarthritis of right knee, Separation anxiety, and TIA (transient ischemic attack). Past surgical history:   has a past surgical history that includes Hysterectomy;  section; Tubal ligation; Cholecystectomy; Knee cartilage surgery; Total knee arthroplasty (Right, 2022); and Ovary removal.  Social History:   reports that she has been smoking cigarettes. She has a 30.00 pack-year smoking history. She has never used smokeless tobacco. She reports that she does not drink alcohol and does not use drugs. Family history:  family history includes Cancer in her father and mother.     No Known Allergies    Review of Systems   All 14 systems were reviewed and are negative  Except for the positive findings  which as documented     BP (!) 114/57   Pulse 68   Temp 97.1 °F (36.2 °C) (Oral)   Resp 16   Ht 5' 3\" (1.6 m)   Wt 245 lb (111.1 kg)   SpO2 96%   BMI 43.40 kg/m²     Intake/Output Summary (Last 24 hours) at 2023 [address] filed at 2/5/2023 0025  Gross per 24 hour   Intake 480 ml   Output --   Net 480 ml       Physical Exam  Vitals reviewed. Constitutional:       General: She is not in acute distress. Appearance: Normal appearance. She is obese. She is not ill-appearing. HENT:      Head: Atraumatic. Neck:      Vascular: No carotid bruit. Cardiovascular:      Rate and Rhythm: Normal rate and regular rhythm. Pulses: Normal pulses. Heart sounds: Normal heart sounds. No murmur heard. Pulmonary:      Effort: Pulmonary effort is normal. No respiratory distress. Breath sounds: Normal breath sounds. Musculoskeletal:         General: No swelling or deformity. Cervical back: Neck supple. No muscular tenderness. Neurological:      Mental Status: She is alert.            Medications:    sodium chloride flush  5-40 mL IntraVENous 2 times per day    atorvastatin  40 mg Oral Nightly    nicotine  1 patch TransDERmal Daily    nitroglycerin  1 inch Topical 4 times per day    insulin lispro  0-4 Units SubCUTAneous TID WC    insulin lispro  0-4 Units SubCUTAneous Nightly    levothyroxine  50 mcg Oral Nightly    [Held by provider] enoxaparin  30 mg SubCUTAneous BID    aspirin  81 mg Oral Daily    FLUoxetine  20 mg Oral Nightly    rOPINIRole  1 mg Oral Nightly    metoprolol succinate  25 mg Oral Daily    hydrOXYzine HCl  50 mg Oral Once      sodium chloride      dextrose       hydrOXYzine pamoate, nitroGLYCERIN, sodium chloride flush, sodium chloride, ondansetron **OR** ondansetron, acetaminophen **OR** acetaminophen, polyethylene glycol, glucose, dextrose bolus **OR** dextrose bolus, glucagon (rDNA), dextrose, nicotine polacrilex    Lab Data:  CBC:   Recent Labs     02/04/23  1115 02/04/23  1602 02/05/23  0419   WBC 10.8* 10.8* 9.4   HGB 15.0 15.4 14.9   HCT 44.8 46.3 45.4   MCV 87.3 88.2 87.8    194 188     BMP:   Recent Labs     02/04/23  1044 02/05/23  0419    143   K 4.0 4.7    105 CO2 21 29   BUN 20 16   CREATININE 0.5* 0.9     PT/INR: No results for input(s): PROTIME, INR in the last 72 hours. BNP:  No results for input(s): PROBNP in the last 72 hours. TROPONIN:   Recent Labs     02/04/23  1044 02/04/23  1602   TROPONINT <0.010 <0.010               All labs, medications and tests reviewed by myself , continue all other medications of all above medical condition listed as is except for changes mentioned above. Thank you very much for consult , please call with questions. Electronically signed by Claudean Hoose, APRN - CNP on 2/5/2023 at 11:59 AM    [facility]:    Trops, EKG wnl  Will plan on stress in am.              HPI:  I have reviewed the HPI  And agree with above   Gil Huggins is a 45 y. o.year old who and presents with had concerns including Chest Pain. Chief Complaint   Patient presents with    Chest Pain     Please review addendum/changes made to note above   Interval history:  Chest pain free today. Physical Exam:  General:  Awake, alert, NAD  Head:normal  Eye:normal  Neck:  No JVD   Chest:  Clear to auscultation, respiration easy  Cardiovascular: Regular pulse   Abdomen:   nontender  Extremities: No edema  Pulses; palpable  Neuro: grossly normal        I agree with the plan, which was planned by myself and discussed with advanced level provider. My documented MDM is a substantive portion of the supervisory note. I have seen ,spoken to  and examined this patient personally, independently of the advanced level provider. I have spent substantiate  portion of this encounter independently myself in examining patient and developing the medical management plan . I have reviewed the hospital care given to date and reviewed all pertinent labs and imaging. The plan was developed mutually at the time of the visit with the patient,  NP /PA  and myself.  I have spoken with patient, nursing staff and provided written and verbal instructions . The above note has been reviewed and I agree with the assessment, diagnosis, and treatment plan with changes made by me as follows .     Natalie Davis MD

## 2023-02-05 NOTE — PLAN OF CARE
Problem: Discharge Planning  Goal: Discharge to home or other facility with appropriate resources  2/5/2023 0024 by Allyson Anguiano RN  Outcome: Progressing  Flowsheets (Taken 2/4/2023 2000)  Discharge to home or other facility with appropriate resources:   Identify barriers to discharge with patient and caregiver   Arrange for needed discharge resources and transportation as appropriate   Identify discharge learning needs (meds, wound care, etc)   Arrange for interpreters to assist at discharge as needed   Refer to discharge planning if patient needs post-hospital services based on physician order or complex needs related to functional status, cognitive ability or social support system  2/4/2023 1831 by Xiomara Astorga RN  Outcome: Progressing     Problem: Pain  Goal: Verbalizes/displays adequate comfort level or baseline comfort level  2/5/2023 0024 by Allyson Anguiano RN  Outcome: Progressing  Flowsheets  Taken 2/5/2023 0000  Verbalizes/displays adequate comfort level or baseline comfort level:   Encourage patient to monitor pain and request assistance   Assess pain using appropriate pain scale   Administer analgesics based on type and severity of pain and evaluate response   Implement non-pharmacological measures as appropriate and evaluate response   Consider cultural and social influences on pain and pain management   Notify Licensed Independent Practitioner if interventions unsuccessful or patient reports new pain  Taken 2/4/2023 2300  Verbalizes/displays adequate comfort level or baseline comfort level:   Encourage patient to monitor pain and request assistance   Assess pain using appropriate pain scale   Administer analgesics based on type and severity of pain and evaluate response   Implement non-pharmacological measures as appropriate and evaluate response   Consider cultural and social influences on pain and pain management   Notify Licensed Independent Practitioner if interventions unsuccessful or patient reports new pain  Taken 2/4/2023 2000  Verbalizes/displays adequate comfort level or baseline comfort level:   Encourage patient to monitor pain and request assistance   Assess pain using appropriate pain scale   Administer analgesics based on type and severity of pain and evaluate response   Implement non-pharmacological measures as appropriate and evaluate response   Consider cultural and social influences on pain and pain management   Notify Licensed Independent Practitioner if interventions unsuccessful or patient reports new pain  2/4/2023 1831 by Aren Berg RN  Outcome: Progressing     Problem: Skin/Tissue Integrity  Goal: Absence of new skin breakdown  Description: 1. Monitor for areas of redness and/or skin breakdown  2. Assess vascular access sites hourly  3. Every 4-6 hours minimum:  Change oxygen saturation probe site  4. Every 4-6 hours:  If on nasal continuous positive airway pressure, respiratory therapy assess nares and determine need for appliance change or resting period.   2/5/2023 0024 by Racheal Collet, RN  Outcome: Progressing  2/4/2023 1831 by Arne Berg RN  Outcome: Progressing

## 2023-02-06 ENCOUNTER — APPOINTMENT (OUTPATIENT)
Dept: NUCLEAR MEDICINE | Age: 53
End: 2023-02-06
Payer: MEDICARE

## 2023-02-06 VITALS
BODY MASS INDEX: 43.73 KG/M2 | TEMPERATURE: 98 F | HEIGHT: 63 IN | WEIGHT: 246.8 LBS | OXYGEN SATURATION: 93 % | HEART RATE: 68 BPM | DIASTOLIC BLOOD PRESSURE: 64 MMHG | SYSTOLIC BLOOD PRESSURE: 121 MMHG | RESPIRATION RATE: 16 BRPM

## 2023-02-06 DIAGNOSIS — R00.2 PALPITATION: Primary | ICD-10-CM

## 2023-02-06 LAB
EKG ATRIAL RATE: 63 BPM
EKG ATRIAL RATE: 66 BPM
EKG ATRIAL RATE: 68 BPM
EKG DIAGNOSIS: NORMAL
EKG P AXIS: 47 DEGREES
EKG P AXIS: 48 DEGREES
EKG P AXIS: 56 DEGREES
EKG P-R INTERVAL: 138 MS
EKG P-R INTERVAL: 140 MS
EKG P-R INTERVAL: 154 MS
EKG Q-T INTERVAL: 396 MS
EKG Q-T INTERVAL: 404 MS
EKG Q-T INTERVAL: 412 MS
EKG QRS DURATION: 90 MS
EKG QRS DURATION: 92 MS
EKG QRS DURATION: 94 MS
EKG QTC CALCULATION (BAZETT): 421 MS
EKG QTC CALCULATION (BAZETT): 421 MS
EKG QTC CALCULATION (BAZETT): 423 MS
EKG R AXIS: 15 DEGREES
EKG R AXIS: 16 DEGREES
EKG R AXIS: 4 DEGREES
EKG T AXIS: 0 DEGREES
EKG T AXIS: 19 DEGREES
EKG T AXIS: 22 DEGREES
EKG VENTRICULAR RATE: 63 BPM
EKG VENTRICULAR RATE: 66 BPM
EKG VENTRICULAR RATE: 68 BPM
GLUCOSE BLD-MCNC: 113 MG/DL (ref 70–99)
GLUCOSE BLD-MCNC: 98 MG/DL (ref 70–99)
GLUCOSE BLD-MCNC: 99 MG/DL (ref 70–99)
LV EF: 70 %
LVEF MODALITY: NORMAL

## 2023-02-06 PROCEDURE — 93010 ELECTROCARDIOGRAM REPORT: CPT | Performed by: INTERNAL MEDICINE

## 2023-02-06 PROCEDURE — APPSS30 APP SPLIT SHARED TIME 16-30 MINUTES

## 2023-02-06 PROCEDURE — 6370000000 HC RX 637 (ALT 250 FOR IP): Performed by: INTERNAL MEDICINE

## 2023-02-06 PROCEDURE — 93017 CV STRESS TEST TRACING ONLY: CPT

## 2023-02-06 PROCEDURE — G0378 HOSPITAL OBSERVATION PER HR: HCPCS

## 2023-02-06 PROCEDURE — 6370000000 HC RX 637 (ALT 250 FOR IP): Performed by: NURSE PRACTITIONER

## 2023-02-06 PROCEDURE — 82962 GLUCOSE BLOOD TEST: CPT

## 2023-02-06 PROCEDURE — 3430000000 HC RX DIAGNOSTIC RADIOPHARMACEUTICAL: Performed by: NURSE PRACTITIONER

## 2023-02-06 PROCEDURE — 6370000000 HC RX 637 (ALT 250 FOR IP): Performed by: SURGERY

## 2023-02-06 PROCEDURE — 78452 HT MUSCLE IMAGE SPECT MULT: CPT

## 2023-02-06 PROCEDURE — 2580000003 HC RX 258: Performed by: NURSE PRACTITIONER

## 2023-02-06 PROCEDURE — 6360000002 HC RX W HCPCS: Performed by: INTERNAL MEDICINE

## 2023-02-06 PROCEDURE — 94761 N-INVAS EAR/PLS OXIMETRY MLT: CPT

## 2023-02-06 PROCEDURE — A9500 TC99M SESTAMIBI: HCPCS | Performed by: NURSE PRACTITIONER

## 2023-02-06 RX ORDER — TECHNETIUM TC-99M SESTAMIBI 1 MG/10ML
10 INJECTION INTRAVENOUS
Status: COMPLETED | OUTPATIENT
Start: 2023-02-06 | End: 2023-02-06

## 2023-02-06 RX ORDER — HYDROXYZINE HYDROCHLORIDE 25 MG/1
25 TABLET, FILM COATED ORAL EVERY 6 HOURS PRN
Qty: 30 TABLET | Refills: 0 | Status: SHIPPED | OUTPATIENT
Start: 2023-02-06 | End: 2023-02-11

## 2023-02-06 RX ORDER — ATORVASTATIN CALCIUM 40 MG/1
40 TABLET, FILM COATED ORAL NIGHTLY
Qty: 30 TABLET | Refills: 3 | Status: SHIPPED | OUTPATIENT
Start: 2023-02-06

## 2023-02-06 RX ORDER — METOPROLOL SUCCINATE 25 MG/1
25 TABLET, EXTENDED RELEASE ORAL DAILY
Qty: 30 TABLET | Refills: 3 | Status: SHIPPED | OUTPATIENT
Start: 2023-02-07

## 2023-02-06 RX ORDER — TECHNETIUM TC-99M SESTAMIBI 1 MG/10ML
30 INJECTION INTRAVENOUS
Status: COMPLETED | OUTPATIENT
Start: 2023-02-06 | End: 2023-02-06

## 2023-02-06 RX ADMIN — HYDROXYZINE PAMOATE 25 MG: 25 CAPSULE ORAL at 09:34

## 2023-02-06 RX ADMIN — SODIUM CHLORIDE, PRESERVATIVE FREE 10 ML: 5 INJECTION INTRAVENOUS at 13:31

## 2023-02-06 RX ADMIN — KIT FOR THE PREPARATION OF TECHNETIUM TC99M SESTAMIBI 10 MILLICURIE: 1 INJECTION, POWDER, LYOPHILIZED, FOR SOLUTION PARENTERAL at 08:00

## 2023-02-06 RX ADMIN — NITROGLYCERIN 1 INCH: 20 OINTMENT TOPICAL at 06:14

## 2023-02-06 RX ADMIN — HYDROXYZINE PAMOATE 25 MG: 25 CAPSULE ORAL at 15:44

## 2023-02-06 RX ADMIN — ASPIRIN 81 MG: 81 TABLET, COATED ORAL at 13:29

## 2023-02-06 RX ADMIN — KIT FOR THE PREPARATION OF TECHNETIUM TC99M SESTAMIBI 30 MILLICURIE: 1 INJECTION, POWDER, LYOPHILIZED, FOR SOLUTION PARENTERAL at 09:40

## 2023-02-06 RX ADMIN — NITROGLYCERIN 1 INCH: 20 OINTMENT TOPICAL at 13:29

## 2023-02-06 RX ADMIN — METOPROLOL SUCCINATE 25 MG: 25 TABLET, EXTENDED RELEASE ORAL at 13:29

## 2023-02-06 RX ADMIN — REGADENOSON 0.4 MG: 0.08 INJECTION, SOLUTION INTRAVENOUS at 09:18

## 2023-02-06 NOTE — PROGRESS NOTES
[address]  Phone: [phone]    Fax [phone]                  Virginia Clifton MD, Miguel Bhatia MD, Reena Cruz MD, MD Dorita Birch MD Eddye First, MD Burnette Harding, MD Dr. Nedra James MD Wilfrid Scotts, APRN      Matthew House, APRN  Camille Ward, APRN    Les Cousin, APRN  Lisa Grya PA-C    CARDIOLOGY  NOTE      Name:  Jose Raul Rodriguez /Age/Sex: 1970  (45 y.o. female)   MRN & CSN:  [id number] & [id number] Admission Date/Time: 2023  9:32 AM   [facility]: Braden Pearl, Quang Bob Day: 3    - Cardiology consult is for: Chest pain      ASSESSMENT/ PLAN:  Atypical chest pain  -Troponins negative, stress test today was negative for ischemia. Previous echo last year did not show any signs of wall motion or valvular heart disease  -No further ischemic work-up planned  Palpitations  Occasional PVCs  -We will check event monitor as outpatient  COPD  -Per primary care  History of tobacco use  -Advised continued smoking cessation        Patient will follow-up with Dr. Shani Salas as an outpatient. Cardiology will sign off, please call with any questions. Subjective:  Daniel Peng is a 45 y. o.year old     Patient is still complaining of on and off palpitations, she did state that she experienced a palpitation while she was undergoing her stress test.  No arrhythmias noted on EKG or telemetry. However patient is experiencing some PVCs. Objective: Temperature:  Current - Temp: 98.4 °F (36.9 °C);  Max - Temp  Av.4 °F (36.9 °C)  Min: 98.4 °F (36.9 °C)  Max: 98.4 °F (36.9 °C)    Respiratory Rate : Resp  Av  Min: 16  Max: 16    Pulse Range: Pulse  Av  Min: 60  Max: 60    Blood Presuure Range:  Systolic (34SJW), USQ:871 , Min:118 , CQA:397   ; Diastolic (34HDF), ZQJ:44, Min:60, Max:60      Pulse ox Range: No data recorded    24hr I & O:    Intake/Output Summary (Last 24 hours) at 2023 1315  Last data filed at 2023  Gross per 24 hour   Intake 240 ml   Output --   Net 240 ml         /60   Pulse 60   Temp 98.4 °F (36.9 °C) (Oral)   Resp 16   Ht 5' 3\" (1.6 m)   Wt 246 lb 12.8 oz (111.9 kg)   SpO2 93%   BMI 43.72 kg/m²         TELEMETRY: Latasha      has a past medical history of Anxiety, COPD (chronic obstructive pulmonary disease) ([facility] [address] Hypoglycemia, Panic attacks, Primary osteoarthritis of right knee, Separation anxiety, and TIA (transient ischemic attack). has a past surgical history that includes Hysterectomy;  section; Tubal ligation; Cholecystectomy; Knee cartilage surgery; Total knee arthroplasty (Right, 2022); and Ovary removal.    Physical Exam  Constitutional:       General: She is not in acute distress. Appearance: She is not diaphoretic. HENT:      Head: Normocephalic and atraumatic. Right Ear: External ear normal.      Left Ear: External ear normal.      Nose: Nose normal.      Mouth/Throat:      Mouth: Mucous membranes are moist.   Eyes:      Extraocular Movements: Extraocular movements intact. Comments: Pupils equal and round   Neck:      Vascular: No carotid bruit. Cardiovascular:      Rate and Rhythm: Normal rate and regular rhythm. Pulses: Normal pulses. Heart sounds: S1 normal and S2 normal. No murmur heard. No friction rub. No gallop. Pulmonary:      Effort: Pulmonary effort is normal. No respiratory distress. Breath sounds: Normal breath sounds. No rales. Chest:      Chest wall: No tenderness. Abdominal:      Palpations: Abdomen is soft. Tenderness: There is no abdominal tenderness. Musculoskeletal:      Right lower leg: No edema. Left lower leg: No edema. Skin:     General: Skin is warm and dry. Capillary Refill: Capillary refill takes less than 2 seconds. Findings: No rash. Comments: Skin turgor brisk   Neurological:      Mental Status: She is alert and oriented to person, place, and time. Mental status is at baseline. Psychiatric:         Behavior: Behavior is cooperative. Thought Content: Thought content normal.         Judgment: Judgment normal.        Medications:    sodium chloride flush  5-40 mL IntraVENous 2 times per day    atorvastatin  40 mg Oral Nightly    nicotine  1 patch TransDERmal Daily    nitroglycerin  1 inch Topical 4 times per day    insulin lispro  0-4 Units SubCUTAneous TID WC    insulin lispro  0-4 Units SubCUTAneous Nightly    levothyroxine  50 mcg Oral Nightly    [Held by provider] enoxaparin  30 mg SubCUTAneous BID    aspirin  81 mg Oral Daily    FLUoxetine  20 mg Oral Nightly    rOPINIRole  1 mg Oral Nightly    metoprolol succinate  25 mg Oral Daily    hydrOXYzine HCl  50 mg Oral Once      sodium chloride      dextrose       hydrOXYzine pamoate, nitroGLYCERIN, sodium chloride flush, sodium chloride, ondansetron **OR** ondansetron, acetaminophen **OR** acetaminophen, polyethylene glycol, glucose, dextrose bolus **OR** dextrose bolus, glucagon (rDNA), dextrose, nicotine polacrilex    Lab Data:  CBC:   Recent Labs     02/04/23  1115 02/04/23  1602 02/05/23  0419   WBC 10.8* 10.8* 9.4   HGB 15.0 15.4 14.9   HCT 44.8 46.3 45.4   MCV 87.3 88.2 87.8    194 188     BMP:   Recent Labs     02/04/23  1044 02/05/23  0419    143   K 4.0 4.7    105   CO2 21 29   BUN 20 16   CREATININE 0.5* 0.9     LIVER PROFILE:   Recent Labs     02/04/23  1044 02/05/23  0419   AST 28 26   ALT 57* 55*   BILITOT 0.6 0.7   ALKPHOS 73 70     PT/INR: No results for input(s): PROTIME, INR in the last 72 hours. APTT: No results for input(s): APTT in the last 72 hours. BNP:  No results for input(s): BNP in the last 72 hours.   TROPONIN:   Recent Labs     02/04/23  1044 02/04/23  1602   TROPONINT <0.010 <0.010     Labs, consult, tests reviewed          Stephani Kay Topher Castaneda PA-C, 2/6/2023 1:15 PM

## 2023-02-06 NOTE — PROGRESS NOTES
Outpatient Pharmacy Progress Note for Meds-to-Beds    Total number of Prescriptions Filled: 3  The following medications were dispensed to the patient during the discharge process:  Atorvastatin  Hydroxyzine  Metoprolol    Additional Documentation:  Medications given to nurse [facility] to provide to patient      Thank you for letting us serve your patients.   [facility]    [address], [address] [facility]    Phone: [phone]    Fax: [phone]

## 2023-02-06 NOTE — DISCHARGE SUMMARY
V2.0  Discharge Summary    Name:  Alejandro Mckeon /Age/Sex: 1970 (45 y.o. female)   Admit Date: 2023  Discharge Date: 23    MRN & CSN:  [id number] & [id number] Encounter Date and Time 23 1:57 PM EST    Attending:  Johnie Moffett MD Discharging Provider: Johnie Moffett MD       Discharge Diagnosess:     Chest pain  Smoker  Obesity BMI 43  Depression/Anxiety      Admission HPI, hospital course, and consultants:     Admission HPI:  Robel Candelaria is a 45 y.o. female with pmh of recently diagnosed non-insulin-dependent type 2 diabetes, former smoker, obesity, former smoker who presents with new onset chest pain that started today with exertion. Patient states she got up to go to the bathroom today and had new onset of left-sided chest pain radiating to her neck, left arm. It happened this morning after she ate breakfast.  Lasted a couple minutes after she reports it was intermittent and did not go away after she sat down and rested. Prompting her to call EMS and present to the ED for evaluation. It was improved with the nitro patch in the ED. Patient states in the last months she has quit smoking and also quit caffeine from sodas. States she was drinking 3 -32 ounce regular sodas up until 1 month ago prior to her diabetes diagnosis. States she quit caffeine and smoking cold turkey. States she has lost 22 pounds. Recently has started reintroducing some sugar-free/diet soda into her diet and she feels may be quitting caffeine and smoking at the same time or not a good idea. See above\"    Hospital course:  Stress test negative, ECG and trop negative. Discharged home in stable condition with cardiology follow up for heart monitor. The patient expressed appropriate understanding of, and agreement with the discharge recommendations, medications, and plan. Consults this admission:  IP CONSULT TO CARDIOLOGY    Discharge Instructions:    Follow up appointments: cardiology  Primary care physician: Tomasa Lazcano MD within 2 weeks  Diet: diabetic diet   Activity: activity as tolerated  Disposition: Discharged to:   [x]Home, []HHC, []SNF, []Acute Rehab, []Hospice   Condition on discharge: Stable  Labs and Tests to be Followed up as an outpatient by PCP or Specialist: none    Discharge Medications:        Medication List        START taking these medications      atorvastatin 40 MG tablet  Commonly known as: LIPITOR  Take 1 tablet by mouth nightly     metoprolol succinate 25 MG extended release tablet  Commonly known as: TOPROL XL  Take 1 tablet by mouth daily  Start taking on: February 7, 2023            CHANGE how you take these medications      hydrOXYzine HCl 25 MG tablet  Commonly known as: ATARAX  Take 1 tablet by mouth every 6 hours as needed for Anxiety or Itching  What changed: reasons to take this            CONTINUE taking these medications      aspirin 325 MG EC tablet  Take 1 tablet by mouth 2 times daily     * FLUoxetine 40 MG capsule  Commonly known as: PROzac     * FLUoxetine 20 MG capsule  Commonly known as: PROZAC     HM Sterile Alcohol Prep Pads     hydrALAZINE 25 MG tablet  Commonly known as: APRESOLINE     levothyroxine 50 MCG tablet  Commonly known as: SYNTHROID     meloxicam 15 MG tablet  Commonly known as: MOBIC     metFORMIN 500 MG tablet  Commonly known as: GLUCOPHAGE     nicotine 21 MG/24HR  Commonly known as: NICODERM CQ  Place 1 patch onto the skin daily     nicotine polacrilex 4 MG gum  Commonly known as: NICORETTE  Take 1 each by mouth every hour as needed for Smoking cessation     OneTouch Delica Lancets 60S Misc     OneTouch Verio Flex System w/Device Kit     OneTouch Verio strip  Generic drug: blood glucose test strips     rOPINIRole 0.25 MG tablet  Commonly known as: REQUIP  TAKE 3 TABLETS BY MOUTH 2 HOURS BEFORE BED     traZODone 50 MG tablet  Commonly known as: DESYREL     vitamin D3 25 MCG (1000 UT) Tabs tablet  Commonly known as: CHOLECALCIFEROL * This list has 2 medication(s) that are the same as other medications prescribed for you. Read the directions carefully, and ask your doctor or other care provider to review them with you. STOP taking these medications      zolpidem 10 MG tablet  Commonly known as: AMBIEN            ASK your doctor about these medications      cyclobenzaprine 10 MG tablet  Commonly known as: FLEXERIL     gabapentin 300 MG capsule  Commonly known as: NEURONTIN     Spiriva Respimat 2.5 MCG/ACT Aers inhaler  Generic drug: tiotropium               Where to Get Your Medications        These medications were sent to [address] [place] [address], [address]      Phone: [phone]   atorvastatin 40 MG tablet  hydrOXYzine HCl 25 MG tablet  metoprolol succinate 25 MG extended release tablet        Objective Findings at Discharge:   /64   Pulse 68   Temp 98.4 °F (36.9 °C) (Oral)   Resp 16   Ht 5' 3\" (1.6 m)   Wt 246 lb 12.8 oz (111.9 kg)   SpO2 93%   BMI 43.72 kg/m²       Physical Exam:   General: NAD  Eyes: EOMI  ENT: neck supple  Cardiovascular: Regular rate. Respiratory: Clear to auscultation  Gastrointestinal: Soft, non tender  Genitourinary: no suprapubic tenderness  Musculoskeletal: No edema  Skin: warm, dry  Neuro: Alert. Psych: Mood appropriate. Labs and Imaging   XR CHEST PORTABLE    Result Date: 2/4/2023  EXAMINATION: ONE XRAY VIEW OF THE CHEST 2/4/2023 10:00 am COMPARISON: 02/01/2023 HISTORY: ORDERING SYSTEM PROVIDED HISTORY: chest pain TECHNOLOGIST PROVIDED HISTORY: Reason for exam:->chest pain Reason for Exam: chest pain FINDINGS: The lungs are without acute focal process. There is no effusion or pneumothorax. The cardiomediastinal silhouette is stable. The osseous structures are stable. No acute process.      XR CHEST PORTABLE    Result Date: 2/1/2023  EXAMINATION: ONE XRAY VIEW OF THE CHEST 2/1/2023 11:18 am COMPARISON: 02/16/2022 HISTORY: ORDERING SYSTEM PROVIDED HISTORY: Chest pain TECHNOLOGIST PROVIDED HISTORY: Reason for exam:->Chest pain Reason for Exam: chest pain FINDINGS: The heart size is enlarged. The pulmonary vasculature is mildly congested. No acute infiltrates are seen. No pneumothoraces are noted. 1. Cardiomegaly with mild vascular congestion. NM MYOCARDIAL SPECT REST EXERCISE OR RX    Result Date: 2/6/2023  Cardiac Perfusion Imaging   Demographics   Patient Name      [facility] Date of study        02/06/2023   Date of Birth     1970         Gender               Female   Age               45 year(s)         Race                    Patient Number    [id number]         Room Number          4920   Visit Number      [id number]          Height               63 inches   Corporate ID      [id number]           Weight               246 pounds   Accession Number  [id number]                                        NM Technologist      Ho Hayes MD  Physician         APRN-CNP           Cardiologist   Conclusions   Summary  Normal perfusion  Normal EF >70 % with normal ventricular contractility. Recommendation  Routine medical follow up. Signatures   ------------------------------------------------------------------  Electronically signed by Tiny Ly MD (Interpreting  cardiologist) on 02/06/2023 at 12:02  ------------------------------------------------------------------  Procedure Procedure Type:   Nuclear Stress Test:Pharmacological, Myocardial Perfusion Imaging with  Pharm, NM MYOCARDIAL SPECT REST EXERCISE OR RX  Indications: Chest pain. Risk Factors   The patient risk factors include:obesity, Current - Every day tobacco use,  hypercholesterolemia, diabetes mellitus and chronic lung disease.   Stress Protocols   Resting HR:65 bpm  Resting BP:124/65 mmHg Stress Protocol:Pharmacologic - Lexiscan  Peak HR:98 bpm                   HR/BP product:[id number]  Peak BP:124/65 mmHg  Predicted HR: 166 bpm  % of predicted HR: 59   Exercise duration: 01:01 min  Reason for termination:Completed   Symptoms  No symptoms with stress. Procedure Medications   - Lexiscan I.V. bolus (over 15sec.) 0.4 mg admininstered @ 02/06/2023 09:40. Imaging Protocols   Rest                             Stress   Isotope:Sestamibi 99mTc          Isotope: Sestamibi 99mTc  Isotope dose:10.8 mCi            Isotope dose:32.9 mCi  Administration route: I.V. Administration route: I.V. Injection Date:02/06/2023 08:00  Injection Date:02/06/2023 09:40  Scan Date:02/06/2023 08:45       Scan Date:02/06/2023 10:25   Technique:        SPECT          Technique:        Gated                                                     SPECT   Procedure Description   Upon patient arrival, the patient is identified using two identifiers and  the physician order is verified. An IV is established and 8-11mCi of 99mTc  Sestamibi is intravenously injected and followed with 10mL 0.9% Normal  Saline flush. A circulation period of 45 minutes occurs prior to resting  SPECT imaging. After imaging is complete the patient is escorted to the  stress lab. The patient is connected to the ECG and blood pressure is  measured. The RN starts the stress portion of the exam and rapidly  intravenously injects Lexiscan (regadenosine) 0.4mg over a period of 10 to15  seconds and follows with 5mL 0.9% Normal Saline flush. Immediately following  the Nuclear Technologist intravenously injects 22-33mCi of 99mTc Sestamibi  and 5mL 0.9% Normal Saline flush. After completion, recovery, and removal of  the IV, the patient rests during the second circulation period of 45  minutes. Final stress SPECT gated imaging is performed. The patient may  return home or to their room after stress imaging.  The images are processed  and final charting is completed and sent to the appropriate cardiologist for  interpretation and reporting. Perfusion Interpretation   Normal perfusion  Normal EF >70 % with normal ventricular contractility. Imaging Results    Summed scores     - Summed stress score: 4     - Summed rest score: 0     - Summed difference score:    4   Rest ejection  Ejection fraction:74 %  EDV :81 ml  ESV :21 ml  Stroke volume :60 ml  Medical History   Accession#:  [id number]  Admission Data Admission date: 02/04/2023 Admission Time: 09:32 Hospital Status: Inpatient. CBC:   Recent Labs     02/04/23  1115 02/04/23  1602 02/05/23  0419   WBC 10.8* 10.8* 9.4   HGB 15.0 15.4 14.9    194 188     BMP:    Recent Labs     02/04/23  1044 02/05/23  0419    143   K 4.0 4.7    105   CO2 21 29   BUN 20 16   CREATININE 0.5* 0.9   GLUCOSE 100* 96     Hepatic:   Recent Labs     02/04/23  1044 02/05/23  0419   AST 28 26   ALT 57* 55*   BILITOT 0.6 0.7   ALKPHOS 73 70     Lipids: No results found for: CHOL, HDL, TRIG  Hemoglobin A1C:   Lab Results   Component Value Date/Time    LABA1C 6.8 02/04/2023 04:02 PM     TSH: No results found for: TSH  Troponin:   Lab Results   Component Value Date/Time    TROPONINT <0.010 02/04/2023 04:02 PM    TROPONINT <0.010 02/04/2023 10:44 AM    TROPONINT <0.010 02/01/2023 11:18 AM     Lactic Acid: No results for input(s): LACTA in the last 72 hours. BNP: No results for input(s): PROBNP in the last 72 hours.   UA:  Lab Results   Component Value Date/Time    NITRU NEGATIVE 01/29/2023 06:43 PM    NITRU NEGATIVE 06/08/2013 06:40 PM    COLORU YELLOW 01/29/2023 06:43 PM    WBCUA 3 01/29/2023 06:43 PM    RBCUA NONE SEEN 01/29/2023 06:43 PM    MUCUS RARE 02/16/2022 10:15 AM    TRICHOMONAS NONE SEEN 01/29/2023 06:43 PM    BACTERIA NEGATIVE 01/29/2023 06:43 PM    CLARITYU CLEAR 01/29/2023 06:43 PM    SPECGRAV 1.010 01/29/2023 06:43 PM    LEUKOCYTESUR NEGATIVE 01/29/2023 06:43 PM    UROBILINOGEN 0.2 01/29/2023 06:43 PM    BILIRUBINUR NEGATIVE 01/29/2023 06:43 PM    BLOODU TRACE 01/29/2023 06:43 PM    GLUCOSEU neg 06/08/2013 06:40 PM    GLUCOSEU NEGATIVE 06/08/2013 06:40 PM    KETUA NEGATIVE 01/29/2023 06:43 PM     Urine Cultures: No results found for: [place] Bel  Blood Cultures: No results found for: BC  No results found for: BLOODCULT2  Organism:   Lab Results   Component Value Date/Time    [facility] MRSA 03/04/2013 12:45 AM       Time Spent Discharging patient 45 minutes    Electronically signed by Talon Gomez MD on 2/6/2023 at 1:57 PM

## 2023-02-06 NOTE — PROGRESS NOTES
Went over pt discharge paperwork and answered all questions. Discussed meds from outpatient pharmacy and referred to [facility] for when to take them. Encouraged to follow up with PCP. Pt has neighbor coming to take for heart monitor and home. Pt denied any other needs or concerns.  Checked blood sugar prior to discharge and removed left PIV

## 2023-02-06 NOTE — PROGRESS NOTES
V2.0  [facility] Hospitalist Progress Note      Name:  Lam Luo /Age/Sex: 1970  (45 y.o. female)   MRN & CSN:  [id number] & [id number] Encounter Date/Time: 2023 10:15 AM EST    Location:  [address] [place] [address] PCP: González Womack MD       Hospital Day: 3    Assessment and Plan:   Lam Luo is a 45 y.o. female with CP      Plan: stress test this AM, pending results. Discharge if negative. Chest pain r/o ACS  Placed in observation  Initial troponin less than 0.010, every 3 hours x2              Telemetry monitoring               Chest x-ray reviewed, negative for acute cardiopulmonary process              EKG reviewed, normal sinus rhythm, rate 60. No evidence of acute ST elevation or depression . Repeat EKG in AM              Cardiology consult. Dr Ibis Cordova on call              We will continue home aspirin, initiate Lipitor,              Check lipid panel in am      Non-insulin-dependent type 2 diabetes mellitus               sliding scale with hypoglycemia protocol              -Hold home oral antihyperglycemics, check hemoglobin A1c              Reviewed with patient sugar from regular soda more harmful than caffeine and moderate caffeine intake is ok. Discussed that Diet soda/ sugar free soda in moderation or coffee  (1-2 cups per day) would be okay as multiple drastic lifestyle changes at once such as quitting caffeine and smoking at the same time are difficult to maintain. Patient voiced understanding      Former smoker              27-pack-year history, recently quit smoking              Continued cessation encouraged, nicotine patch and lozenges ordered     Depression  Anxiety              Recently initiated on fluoxetine with               Atarax added as needed as needed. Continue current medications     Hypothyroidism              Continue levothyroxine     Obesity  Body mass index is 43.4 kg/m².   Lifestyle modifications advised     Ppx: Lovenox  Dispo:     Subjective: Chief Complaint: Chest Pain       Patient is without complaints. No adverse interval events. Stress test this AM.           Review of Systems:    Review of Systems    10 point ROS negative except as stated above in \"subjective\" section    Objective: Intake/Output Summary (Last 24 hours) at 2/6/2023 1119  Last data filed at 2/5/2023 2000  Gross per 24 hour   Intake 240 ml   Output --   Net 240 ml          Vitals:   Vitals:    02/05/23 2325   BP: 118/60   Pulse: 60   Resp: 16   Temp: 98.4 °F (36.9 °C)   SpO2:        Physical Exam:     General: NAD  Eyes: EOMI  ENT: neck supple  Cardiovascular: Regular rate. Respiratory: Clear to auscultation  Gastrointestinal: Soft, non tender  Genitourinary: no suprapubic tenderness  Musculoskeletal: No edema  Skin: warm, dry  Neuro: Alert. Psych: Mood appropriate.      Medications:   Medications:    sodium chloride flush  5-40 mL IntraVENous 2 times per day    atorvastatin  40 mg Oral Nightly    nicotine  1 patch TransDERmal Daily    nitroglycerin  1 inch Topical 4 times per day    insulin lispro  0-4 Units SubCUTAneous TID WC    insulin lispro  0-4 Units SubCUTAneous Nightly    levothyroxine  50 mcg Oral Nightly    [Held by provider] enoxaparin  30 mg SubCUTAneous BID    aspirin  81 mg Oral Daily    FLUoxetine  20 mg Oral Nightly    rOPINIRole  1 mg Oral Nightly    metoprolol succinate  25 mg Oral Daily    hydrOXYzine HCl  50 mg Oral Once      Infusions:    sodium chloride      dextrose       PRN Meds: technetium sestamibi, 10 millicurie, ONCE PRN  technetium sestamibi, 30 millicurie, ONCE PRN  hydrOXYzine pamoate, 25 mg, TID PRN  nitroGLYCERIN, 0.4 mg, Q5 Min PRN  sodium chloride flush, 5-40 mL, PRN  sodium chloride, , PRN  ondansetron, 4 mg, Q8H PRN   Or  ondansetron, 4 mg, Q6H PRN  acetaminophen, 650 mg, Q6H PRN   Or  acetaminophen, 650 mg, Q6H PRN  polyethylene glycol, 17 g, Daily PRN  glucose, 4 tablet, PRN  dextrose bolus, 125 mL, PRN   Or  dextrose bolus, 250 mL, PRN  glucagon (rDNA), 1 mg, PRN  dextrose, , Continuous PRN  nicotine polacrilex, 4 mg, Q1H PRN      Labs      Recent Results (from the past 24 hour(s))   POCT Glucose    Collection Time: 02/05/23 11:35 AM   Result Value Ref Range    POC Glucose 105 (H) 70 - 99 MG/DL   POCT Glucose    Collection Time: 02/05/23  2:16 PM   Result Value Ref Range    POC Glucose 93 70 - 99 MG/DL   POCT Glucose    Collection Time: 02/05/23  3:57 PM   Result Value Ref Range    POC Glucose 124 (H) 70 - 99 MG/DL   POCT Glucose    Collection Time: 02/05/23  7:54 PM   Result Value Ref Range    POC Glucose 113 (H) 70 - 99 MG/DL   POCT Glucose    Collection Time: 02/06/23  8:06 AM   Result Value Ref Range    POC Glucose 113 (H) 70 - 99 MG/DL        Imaging/Diagnostics Last 24 Hours   XR CHEST PORTABLE    Result Date: 2/4/2023  EXAMINATION: ONE XRAY VIEW OF THE CHEST 2/4/2023 10:00 am COMPARISON: 02/01/2023 HISTORY: ORDERING SYSTEM PROVIDED HISTORY: chest pain TECHNOLOGIST PROVIDED HISTORY: Reason for exam:->chest pain Reason for Exam: chest pain FINDINGS: The lungs are without acute focal process. There is no effusion or pneumothorax. The cardiomediastinal silhouette is stable. The osseous structures are stable. No acute process.        Electronically signed by Shani Cosme MD on 2/6/2023 at 11:19 AM

## 2023-02-06 NOTE — PLAN OF CARE
Problem: Discharge Planning  Goal: Discharge to home or other facility with appropriate resources  Outcome: Adequate for Discharge  Flowsheets  Taken 2/6/2023 1517 by Nadine Gil RN  Discharge to home or other facility with appropriate resources:   Identify barriers to discharge with patient and caregiver   Arrange for needed discharge resources and transportation as appropriate  Taken 2/6/2023 0200 by Odin Short RN  Discharge to home or other facility with appropriate resources:   Identify barriers to discharge with patient and caregiver   Arrange for needed discharge resources and transportation as appropriate   Identify discharge learning needs (meds, wound care, etc)   Arrange for interpreters to assist at discharge as needed   Refer to discharge planning if patient needs post-hospital services based on physician order or complex needs related to functional status, cognitive ability or social support system  Note: Pt discharging to home with neighbor at 1600       Problem: Pain  Goal: Verbalizes/displays adequate comfort level or baseline comfort level  Outcome: Adequate for Discharge  Flowsheets (Taken 2/6/2023 1517)  Verbalizes/displays adequate comfort level or baseline comfort level:   Encourage patient to monitor pain and request assistance   Assess pain using appropriate pain scale   Administer analgesics based on type and severity of pain and evaluate response     Problem: Skin/Tissue Integrity  Goal: Absence of new skin breakdown  Description: 1. Monitor for areas of redness and/or skin breakdown  2. Assess vascular access sites hourly  3. Every 4-6 hours minimum:  Change oxygen saturation probe site  4. Every 4-6 hours:  If on nasal continuous positive airway pressure, respiratory therapy assess nares and determine need for appliance change or resting period.   Outcome: Adequate for Discharge  Note: No skin issues noted     Problem: ABCDS Injury Assessment  Goal: Absence of physical injury  Outcome: Adequate for Discharge  Flowsheets (Taken 2/6/2023 8329)  Absence of Physical Injury: Implement safety measures based on patient assessment

## 2023-02-07 ENCOUNTER — APPOINTMENT (OUTPATIENT)
Dept: GENERAL RADIOLOGY | Age: 53
End: 2023-02-07
Payer: MEDICARE

## 2023-02-07 ENCOUNTER — HOSPITAL ENCOUNTER (EMERGENCY)
Age: 53
Discharge: HOME | End: 2023-02-07
Attending: EMERGENCY MEDICINE
Payer: MEDICARE

## 2023-02-07 VITALS
SYSTOLIC BLOOD PRESSURE: 107 MMHG | OXYGEN SATURATION: 98 % | HEART RATE: 61 BPM | TEMPERATURE: 97.8 F | DIASTOLIC BLOOD PRESSURE: 68 MMHG | RESPIRATION RATE: 18 BRPM

## 2023-02-07 DIAGNOSIS — R07.9 CHEST PAIN, UNSPECIFIED TYPE: Primary | ICD-10-CM

## 2023-02-07 LAB
ALBUMIN SERPL-MCNC: 4.3 GM/DL (ref 3.4–5)
ALP BLD-CCNC: 74 IU/L (ref 40–128)
ALT SERPL-CCNC: 60 U/L (ref 10–40)
ANION GAP SERPL CALCULATED.3IONS-SCNC: 12 MMOL/L (ref 4–16)
AST SERPL-CCNC: 28 IU/L (ref 15–37)
BASOPHILS ABSOLUTE: 0.1 K/CU MM
BASOPHILS RELATIVE PERCENT: 0.6 % (ref 0–1)
BILIRUB SERPL-MCNC: 0.7 MG/DL (ref 0–1)
BUN SERPL-MCNC: 20 MG/DL (ref 6–23)
CALCIUM SERPL-MCNC: 9.6 MG/DL (ref 8.3–10.6)
CHLORIDE BLD-SCNC: 102 MMOL/L (ref 99–110)
CO2: 23 MMOL/L (ref 21–32)
CREAT SERPL-MCNC: 0.7 MG/DL (ref 0.6–1.1)
DIFFERENTIAL TYPE: ABNORMAL
EOSINOPHILS ABSOLUTE: 0.2 K/CU MM
EOSINOPHILS RELATIVE PERCENT: 1.7 % (ref 0–3)
GFR SERPL CREATININE-BSD FRML MDRD: >60 ML/MIN/1.73M2
GLUCOSE BLD-MCNC: 92 MG/DL (ref 70–99)
GLUCOSE BLD-MCNC: 96 MG/DL (ref 70–99)
GLUCOSE SERPL-MCNC: 103 MG/DL (ref 70–99)
HCT VFR BLD CALC: 44.2 % (ref 37–47)
HEMOGLOBIN: 14.7 GM/DL (ref 12.5–16)
IMMATURE NEUTROPHIL %: 0.4 % (ref 0–0.43)
LIPASE: 25 IU/L (ref 13–60)
LYMPHOCYTES ABSOLUTE: 3 K/CU MM
LYMPHOCYTES RELATIVE PERCENT: 24.9 % (ref 24–44)
MAGNESIUM: 2.1 MG/DL (ref 1.8–2.4)
MCH RBC QN AUTO: 28.9 PG (ref 27–31)
MCHC RBC AUTO-ENTMCNC: 33.3 % (ref 32–36)
MCV RBC AUTO: 87 FL (ref 78–100)
MONOCYTES ABSOLUTE: 0.9 K/CU MM
MONOCYTES RELATIVE PERCENT: 7.8 % (ref 0–4)
NUCLEATED RBC %: 0 %
PDW BLD-RTO: 13 % (ref 11.7–14.9)
PLATELET # BLD: 182 K/CU MM (ref 140–440)
PMV BLD AUTO: 12.9 FL (ref 7.5–11.1)
POTASSIUM SERPL-SCNC: 3.7 MMOL/L (ref 3.5–5.1)
RBC # BLD: 5.08 M/CU MM (ref 4.2–5.4)
SEGMENTED NEUTROPHILS ABSOLUTE COUNT: 7.8 K/CU MM
SEGMENTED NEUTROPHILS RELATIVE PERCENT: 64.6 % (ref 36–66)
SODIUM BLD-SCNC: 137 MMOL/L (ref 135–145)
TOTAL IMMATURE NEUTOROPHIL: 0.05 K/CU MM
TOTAL NUCLEATED RBC: 0 K/CU MM
TOTAL PROTEIN: 6.8 GM/DL (ref 6.4–8.2)
TROPONIN T: <0.01 NG/ML
TROPONIN T: <0.01 NG/ML
WBC # BLD: 12 K/CU MM (ref 4–10.5)

## 2023-02-07 PROCEDURE — 85025 COMPLETE CBC W/AUTO DIFF WBC: CPT

## 2023-02-07 PROCEDURE — 80053 COMPREHEN METABOLIC PANEL: CPT

## 2023-02-07 PROCEDURE — 82962 GLUCOSE BLOOD TEST: CPT

## 2023-02-07 PROCEDURE — 6370000000 HC RX 637 (ALT 250 FOR IP): Performed by: EMERGENCY MEDICINE

## 2023-02-07 PROCEDURE — 84484 ASSAY OF TROPONIN QUANT: CPT

## 2023-02-07 PROCEDURE — 71046 X-RAY EXAM CHEST 2 VIEWS: CPT

## 2023-02-07 PROCEDURE — 99285 EMERGENCY DEPT VISIT HI MDM: CPT

## 2023-02-07 PROCEDURE — 83735 ASSAY OF MAGNESIUM: CPT

## 2023-02-07 PROCEDURE — 2500000003 HC RX 250 WO HCPCS: Performed by: EMERGENCY MEDICINE

## 2023-02-07 PROCEDURE — 93005 ELECTROCARDIOGRAM TRACING: CPT | Performed by: EMERGENCY MEDICINE

## 2023-02-07 PROCEDURE — 2580000003 HC RX 258: Performed by: EMERGENCY MEDICINE

## 2023-02-07 PROCEDURE — 96375 TX/PRO/DX INJ NEW DRUG ADDON: CPT

## 2023-02-07 PROCEDURE — 96374 THER/PROPH/DIAG INJ IV PUSH: CPT

## 2023-02-07 PROCEDURE — A4216 STERILE WATER/SALINE, 10 ML: HCPCS | Performed by: EMERGENCY MEDICINE

## 2023-02-07 PROCEDURE — 83690 ASSAY OF LIPASE: CPT

## 2023-02-07 PROCEDURE — 6360000002 HC RX W HCPCS: Performed by: EMERGENCY MEDICINE

## 2023-02-07 RX ORDER — MAGNESIUM HYDROXIDE/ALUMINUM HYDROXICE/SIMETHICONE 120; 1200; 1200 MG/30ML; MG/30ML; MG/30ML
30 SUSPENSION ORAL ONCE
Status: COMPLETED | OUTPATIENT
Start: 2023-02-07 | End: 2023-02-07

## 2023-02-07 RX ORDER — NITROGLYCERIN 0.4 MG/1
0.4 TABLET SUBLINGUAL ONCE
Status: DISCONTINUED | OUTPATIENT
Start: 2023-02-07 | End: 2023-02-08 | Stop reason: HOSPADM

## 2023-02-07 RX ORDER — OMEPRAZOLE 20 MG/1
40 CAPSULE, DELAYED RELEASE ORAL DAILY
Qty: 60 CAPSULE | Refills: 0 | Status: SHIPPED | OUTPATIENT
Start: 2023-02-07 | End: 2023-03-09

## 2023-02-07 RX ORDER — MAGNESIUM HYDROXIDE/ALUMINUM HYDROXICE/SIMETHICONE 120; 1200; 1200 MG/30ML; MG/30ML; MG/30ML
5 SUSPENSION ORAL EVERY 6 HOURS PRN
Qty: 355 ML | Refills: 0 | Status: SHIPPED | OUTPATIENT
Start: 2023-02-07 | End: 2023-03-09

## 2023-02-07 RX ORDER — MIDAZOLAM HYDROCHLORIDE 2 MG/2ML
1 INJECTION, SOLUTION INTRAMUSCULAR; INTRAVENOUS ONCE
Status: COMPLETED | OUTPATIENT
Start: 2023-02-07 | End: 2023-02-07

## 2023-02-07 RX ADMIN — FAMOTIDINE 20 MG: 10 INJECTION, SOLUTION INTRAVENOUS at 18:26

## 2023-02-07 RX ADMIN — MIDAZOLAM 1 MG: 1 INJECTION INTRAMUSCULAR; INTRAVENOUS at 18:29

## 2023-02-07 RX ADMIN — ALUMINUM HYDROXIDE, MAGNESIUM HYDROXIDE, AND SIMETHICONE 30 ML: 200; 200; 20 SUSPENSION ORAL at 18:26

## 2023-02-07 ASSESSMENT — HEART SCORE: ECG: 0

## 2023-02-07 NOTE — Clinical Note
Cindy Forde was seen and treated in our emergency department on 2/7/2023. She may return to work on 02/09/2023. If you have any questions or concerns, please don't hesitate to call.       Damian Marcelino MD

## 2023-02-08 LAB
EKG ATRIAL RATE: 66 BPM
EKG DIAGNOSIS: NORMAL
EKG P AXIS: 59 DEGREES
EKG P-R INTERVAL: 148 MS
EKG Q-T INTERVAL: 408 MS
EKG QRS DURATION: 86 MS
EKG QTC CALCULATION (BAZETT): 427 MS
EKG R AXIS: 4 DEGREES
EKG T AXIS: 5 DEGREES
EKG VENTRICULAR RATE: 66 BPM

## 2023-02-08 PROCEDURE — 93010 ELECTROCARDIOGRAM REPORT: CPT | Performed by: INTERNAL MEDICINE

## 2023-02-08 NOTE — CARE COORDINATION
CM consult per Dr Colin for discharge planning.   Pt anxious she is new diabetic, states she still not sure what her diet is suppose to be, states she feels weak. Pt needing assistance with her weakness and education of diabetes and disease.   CM visited pt who is alert and oriented, dicussed discharge plan. Pt feels weak and anxious about her diabetes, states she still needs education nutrition, and needs to get her straight back, feel very weak.      [facility] order placed for Skilled RN for diabetic education, PT/OT. Packet faxed to [facility] [phone].    Pt has [employer] Medicare, PCP [facility]. POC for d/c with [facility]. ROHAN,RN/CM

## 2023-02-08 NOTE — ED PROVIDER NOTES
[address] ENCOUNTER      Pt Name: Kris Aponte  MRN: [id number]  [place] 1970  Date of evaluation: 2023  Provider: Monae Townsend MD  Insurance:  Payor: [place] [place] / Plan: [facility] Room COMPLETE / Product Type: *No Product type* /   Current Code Status   Code Status: Prior     CHIEF COMPLAINT       Chief Complaint   Patient presents with    Chest Pain         HISTORY OF PRESENT ILLNESS    HPI    Nursing Notes were reviewed. This is a 45 y.o. female who presents to the emergency department with substernal chest pain. The patient says that her symptoms are similar to her prior visit to the emergency department on  that resulted in an admission to the hospital.  The patient was discharged from the hospital yesterday, after having an inpatient evaluation for chest pain including a cardiac catheterization. Patient says the symptoms are similar to this prior episode. She describes substernal chest pain, mild shortness of breath, and feelings of anxiety.     PAST MEDICAL HISTORY     Past Medical History:   Diagnosis Date    Anxiety     COPD (chronic obstructive pulmonary disease) (HCC)     Hypoglycemia     history    Panic attacks     Primary osteoarthritis of right knee     Separation anxiety     TIA (transient ischemic attack)          SURGICAL HISTORY       Past Surgical History:   Procedure Laterality Date     SECTION      CHOLECYSTECTOMY      HYSTERECTOMY (CERVIX STATUS UNKNOWN)      KNEE CARTILAGE SURGERY      R knee     OVARY REMOVAL      TOTAL KNEE ARTHROPLASTY Right 2022    RIGHT KNEE TOTAL ARTHROPLASTY ROBOTIC performed by Olga Spence MD at [address] [place] [address]       Previous Medications    ALCOHOL SWABS (HM STERILE ALCOHOL PREP) PADS    take by Topical route every day    ASPIRIN 325 MG EC TABLET    Take 1 tablet by mouth 2 times daily    ATORVASTATIN (LIPITOR) 40 MG TABLET    Take 1 tablet by mouth nightly    BLOOD GLUCOSE MONITORING SUPPL (ONETOUCH VERIO FLEX SYSTEM) W/DEVICE KIT    USE AS DIRECTED    CHOLECALCIFEROL (VITAMIN D3) 25 MCG (1000 UT) TABS        CYCLOBENZAPRINE (FLEXERIL) 10 MG TABLET        FLUOXETINE (PROZAC) 20 MG CAPSULE    Take 1 capsule by mouth daily    FLUOXETINE (PROZAC) 40 MG CAPSULE    at bedtime     GABAPENTIN (NEURONTIN) 300 MG CAPSULE    300 mg at bedtime. HYDRALAZINE (APRESOLINE) 25 MG TABLET    Take 25 mg by mouth 4 times daily as needed    HYDROXYZINE HCL (ATARAX) 25 MG TABLET    Take 1 tablet by mouth every 6 hours as needed for Anxiety or Itching    LEVOTHYROXINE (SYNTHROID) 50 MCG TABLET    at bedtime     MELOXICAM (MOBIC) 15 MG TABLET        METFORMIN (GLUCOPHAGE) 500 MG TABLET    Take 500 mg by mouth 2 times daily (with meals)    METOPROLOL SUCCINATE (TOPROL XL) 25 MG EXTENDED RELEASE TABLET    Take 1 tablet by mouth daily    NICOTINE (NICODERM CQ) 21 MG/24HR    Place 1 patch onto the skin daily    NICOTINE POLACRILEX (NICORETTE) 4 MG GUM    Take 1 each by mouth every hour as needed for Smoking cessation    ONETOUCH DELICA LANCETS 36C MISC    test UP TO THREE TIMES DAILY    ONETOUCH VERIO STRIP    test 2 - 3 times a day by Transdermal route as directed    ROPINIROLE (REQUIP) 0.25 MG TABLET    TAKE 3 TABLETS BY MOUTH 2 HOURS BEFORE BED    SPIRIVA RESPIMAT 2.5 MCG/ACT AERS INHALER        TRAZODONE (DESYREL) 50 MG TABLET    1 tablet       ALLERGIES     Patient has no known allergies.     FAMILY HISTORY       Family History   Problem Relation Age of Onset    Cancer Mother     Cancer Father     Breast Cancer Neg Hx     Ovarian Cancer Neg Hx           SOCIAL HISTORY       Social History     Socioeconomic History    Marital status:      Spouse name: None    Number of children: None    Years of education: None    Highest education level: None   Tobacco Use    Smoking status: Every Day     Packs/day: 1.00     Years: 30.00     Pack years: 30.00     Types: Cigarettes    Smokeless tobacco: Never   Vaping Use    Vaping Use: Never used   Substance and Sexual Activity    Alcohol use: No     Comment: occasional    Drug use: Never    Sexual activity: Not Currently       PHYSICAL EXAM       ED Triage Vitals [02/07/23 1743]   BP Temp Temp Source Heart Rate Resp SpO2 Height Weight   111/66 97.8 °F (36.6 °C) Oral 71 18 97 % -- --       Physical Exam    Vitals:    Vitals:    02/07/23 1743 02/07/23 2001   BP: 111/66 114/64   Pulse: 71 65   Resp: 18 19   Temp: 97.8 °F (36.6 °C)    TempSrc: Oral    SpO2: 97% 95%       DIAGNOSTIC RESULTS     EKG: All EKG's are interpreted by the Emergency Department Physician who either signs or Co-signs this chart in the absence of a cardiologist.    Normal sinus rhythm. Ventricular rate of 66. CT is 148. QRS is 86. QTc is 427. There are no abnormal ST elevations or depressions. There is no ectopy. There is no significant change from prior EKG from February 2023    RADIOLOGY:   Non-plain film images such as CT, Ultrasound and MRI are read by the radiologist. Plain radiographic images are visualized and preliminarily interpreted by the emergency physician with the below findings:    Interpretation per the Radiologist below, if available at the time of this note:    XR CHEST (2 VW)   Final Result   No acute process. LABS:  Labs Reviewed   CBC WITH AUTO DIFFERENTIAL - Abnormal; Notable for the following components:       Result Value    WBC 12.0 (*)     MPV 12.9 (*)     Monocytes % 7.8 (*)     All other components within normal limits   COMPREHENSIVE METABOLIC PANEL - Abnormal; Notable for the following components:    Glucose 103 (*)     ALT 60 (*)     All other components within normal limits   LIPASE   MAGNESIUM   TROPONIN   TROPONIN   POCT GLUCOSE   POCT GLUCOSE   POCT GLUCOSE       All other labs were within normal range or not returned as of this dictation.     MEDICAL DECISION MAKING     Medications   nitroGLYCERIN (NITROSTAT) SL tablet 0.4 mg (0.4 mg SubLINGual Not Given 2/7/23 1849)   aluminum & magnesium hydroxide-simethicone (MAALOX) 200-200-20 MG/5ML suspension 30 mL (30 mLs Oral Given 2/7/23 1826)   famotidine (PEPCID) 20 mg in sodium chloride (PF) 0.9 % 10 mL injection (20 mg IntraVENous Given 2/7/23 1826)   midazolam PF (VERSED) injection 1 mg (1 mg IntraVENous Given 2/7/23 1829)             [place] Coma Scale  Eye Opening: Spontaneous  Best Verbal Response: Oriented  Best Motor Response: Obeys commands  [place] Coma Scale Score: 15     Heart Score for chest pain patients  History: Slightly Suspicious  ECG: Normal  Patient Age: > 39 and < 65 years  *Risk factors for Atherosclerotic disease: Diabetes Mellitus, Hypertension, Obesity  Risk Factors: > 3 Risk factors or history of atherosclerotic disease*  Troponin: < 1X normal limit  Heart Score Total: 3               CIWA Assessment  BP: 114/64  Heart Rate: 72                 MDM  This is a 45 y.o. female who presents to the emergency department with substernal chest pain. The patient says that her symptoms are similar to her prior visit to the emergency department on February 4 that resulted in an admission to the hospital.  The patient was discharged from the hospital yesterday, after having an inpatient evaluation for chest pain including a cardiac catheterization. Patient says the symptoms are similar to this prior episode. She describes substernal chest pain, mild shortness of breath, and feelings of anxiety. She denies nausea or vomiting. Denies recent fevers. Denies cough. Denies new headaches. Denies abdominal pain. The patient describes a generalized feeling of malaise and lack of energy since discontinuing her high sugar diet, when she was diagnosed with diabetes.   She describes increasing malaise and weakness and is concerned that she is not adapting well from her initial diagnosis of diabetes. Sodium and potassium are normal indicating a low likelihood of hypo or hypernatremia, as well as hypo or hyperkalemia. BUN and creatinine are normal indicating a low likelihood of acute kidney injury or renal failure. Liver function enzymes are normal indicating a low likelihood of acute cholecystitis or hepatitis. Lipase is normal indicating a low likelihood of acute pancreatitis. Hemoglobin hematocrit are normal indicating no evidence of significant anemia. ECG shows no ST elevations and cardiac enzymes are normal indicating a low likelihood of STEMI or NSTEMI    Chest x-ray was completed and shows no evidence of significant intrathoracic pathology including no evidence of pneumonia, pneumothorax, and a low likelihood of aortic disease. Patient was discharged yesterday from work-up on an inpatient basis for similar complaints and as a result the likelihood of new sudden coronary artery disease remains low. Repeat cardiac troponin remains normal.    A definitive etiology of the patient's symptoms was not clearly identified here in the emergency department. The risk of acute coronary syndrome remains low. The patient is concerned that the symptoms represent an exacerbation of her anxiety. She was currently taking Vistaril for her anxiety. I did explain to her that I would be unable to manage her long-term anxiety disorder, if this is in fact the source of her pain. I did recommend that she follow-up with her primary care physician who can better manage this concern on an outpatient basis. I also described my concern for potential gastritis and esophagitis. The patient was placed on antacid medication. I have strongly encouraged her to follow-up with a gastroenterologist and provided her with follow-up information for gastroenterology. The patient has an appointment with a cardiologist later this month.   I have strongly encouraged her to ensure that she follows up with her cardiologist as planned. I encouraged her to come back to the emergency department if she has any worsening symptoms. Clinical Diagnoses Addressed  1. Chest pain, unspecified type              CONSULTS:  IP CONSULT TO CASE MANAGEMENT    PROCEDURES:  Unless otherwise noted below, none     Procedures      FINAL IMPRESSION      1. Chest pain, unspecified type          DISPOSITION/PLAN   DISPOSITION Decision To Discharge 02/07/2023 09:42:00 PM      PATIENT REFERRED TO:  Karuna Barrow, [address] [place] [address].  [id number]  [facility]  [phone]    Call in 1 day      Mary Alice Reyes MD  [address] [place] [address]  [phone]    Call in 1 day      DISCHARGE MEDICATIONS:  New Prescriptions    ALUMINUM & MAGNESIUM HYDROXIDE-SIMETHICONE (MAALOX) 771-244-40 MG/5ML SUSP SUSPENSION    Take 5 mLs by mouth every 6 hours as needed for Indigestion    OMEPRAZOLE (PRILOSEC) 20 MG DELAYED RELEASE CAPSULE    Take 2 capsules by mouth Daily     Controlled Substances Monitoring:     No flowsheet data found.     Charlie Schafer MD (electronically signed)  Attending Emergency Physician            Charlie Schafer MD  02/07/23 2675

## 2023-02-08 NOTE — ED NOTES
Discharge instructions and all medications reviewed pt vebalized understanding. No further questions noted at this time. pt's breathing even and unlabored.      Janel Perez RN  02/07/23 9822

## 2023-02-14 ENCOUNTER — OFFICE VISIT (OUTPATIENT)
Dept: CARDIOLOGY CLINIC | Age: 53
End: 2023-02-14
Payer: MEDICARE

## 2023-02-14 ENCOUNTER — APPOINTMENT (OUTPATIENT)
Dept: GENERAL RADIOLOGY | Age: 53
End: 2023-02-14
Payer: MEDICARE

## 2023-02-14 ENCOUNTER — HOSPITAL ENCOUNTER (EMERGENCY)
Age: 53
Discharge: HOME | End: 2023-02-14
Attending: EMERGENCY MEDICINE
Payer: MEDICARE

## 2023-02-14 VITALS
WEIGHT: 245 LBS | SYSTOLIC BLOOD PRESSURE: 126 MMHG | DIASTOLIC BLOOD PRESSURE: 53 MMHG | HEART RATE: 65 BPM | OXYGEN SATURATION: 95 % | BODY MASS INDEX: 43.41 KG/M2 | RESPIRATION RATE: 17 BRPM | TEMPERATURE: 97.5 F | HEIGHT: 63 IN

## 2023-02-14 VITALS
HEART RATE: 72 BPM | WEIGHT: 242 LBS | HEIGHT: 63 IN | BODY MASS INDEX: 42.88 KG/M2 | SYSTOLIC BLOOD PRESSURE: 118 MMHG | DIASTOLIC BLOOD PRESSURE: 60 MMHG

## 2023-02-14 DIAGNOSIS — F41.9 ANXIETY: ICD-10-CM

## 2023-02-14 DIAGNOSIS — R00.2 PALPITATIONS: ICD-10-CM

## 2023-02-14 DIAGNOSIS — R42 DIZZINESS: ICD-10-CM

## 2023-02-14 DIAGNOSIS — R07.9 CHEST PAIN, UNSPECIFIED TYPE: Primary | ICD-10-CM

## 2023-02-14 DIAGNOSIS — G47.33 OSA (OBSTRUCTIVE SLEEP APNEA): ICD-10-CM

## 2023-02-14 DIAGNOSIS — Z72.0 TOBACCO ABUSE: Primary | Chronic | ICD-10-CM

## 2023-02-14 LAB
ALBUMIN SERPL-MCNC: 4.5 GM/DL (ref 3.4–5)
ALP BLD-CCNC: 73 IU/L (ref 40–129)
ALT SERPL-CCNC: 63 U/L (ref 10–40)
ANION GAP SERPL CALCULATED.3IONS-SCNC: 11 MMOL/L (ref 4–16)
AST SERPL-CCNC: 29 IU/L (ref 15–37)
BASOPHILS ABSOLUTE: 0.1 K/CU MM
BASOPHILS RELATIVE PERCENT: 0.5 % (ref 0–1)
BILIRUB SERPL-MCNC: 0.6 MG/DL (ref 0–1)
BUN SERPL-MCNC: 16 MG/DL (ref 6–23)
CALCIUM SERPL-MCNC: 9.7 MG/DL (ref 8.3–10.6)
CHLORIDE BLD-SCNC: 105 MMOL/L (ref 99–110)
CHP ED QC CHECK: YES
CO2: 23 MMOL/L (ref 21–32)
CREAT SERPL-MCNC: 0.6 MG/DL (ref 0.6–1.1)
D DIMER: <200 NG/ML(DDU)
DIFFERENTIAL TYPE: ABNORMAL
EKG ATRIAL RATE: 65 BPM
EKG DIAGNOSIS: NORMAL
EKG P AXIS: 43 DEGREES
EKG P-R INTERVAL: 138 MS
EKG Q-T INTERVAL: 406 MS
EKG QRS DURATION: 86 MS
EKG QTC CALCULATION (BAZETT): 422 MS
EKG R AXIS: 9 DEGREES
EKG T AXIS: 15 DEGREES
EKG VENTRICULAR RATE: 65 BPM
EOSINOPHILS ABSOLUTE: 0.1 K/CU MM
EOSINOPHILS RELATIVE PERCENT: 1.1 % (ref 0–3)
GFR SERPL CREATININE-BSD FRML MDRD: >60 ML/MIN/1.73M2
GLUCOSE BLD-MCNC: 91 MG/DL
GLUCOSE BLD-MCNC: 91 MG/DL (ref 70–99)
GLUCOSE SERPL-MCNC: 101 MG/DL (ref 70–99)
HCT VFR BLD CALC: 41 % (ref 37–47)
HEMOGLOBIN: 13.5 GM/DL (ref 12.5–16)
IMMATURE NEUTROPHIL %: 0.5 % (ref 0–0.43)
LYMPHOCYTES ABSOLUTE: 1.9 K/CU MM
LYMPHOCYTES RELATIVE PERCENT: 17.6 % (ref 24–44)
MCH RBC QN AUTO: 28.9 PG (ref 27–31)
MCHC RBC AUTO-ENTMCNC: 32.9 % (ref 32–36)
MCV RBC AUTO: 87.8 FL (ref 78–100)
MONOCYTES ABSOLUTE: 0.8 K/CU MM
MONOCYTES RELATIVE PERCENT: 7 % (ref 0–4)
NUCLEATED RBC %: 0 %
PDW BLD-RTO: 12.9 % (ref 11.7–14.9)
PLATELET # BLD: 173 K/CU MM (ref 140–440)
PMV BLD AUTO: 12.3 FL (ref 7.5–11.1)
POTASSIUM SERPL-SCNC: 3.9 MMOL/L (ref 3.5–5.1)
RBC # BLD: 4.67 M/CU MM (ref 4.2–5.4)
SEGMENTED NEUTROPHILS ABSOLUTE COUNT: 8 K/CU MM
SEGMENTED NEUTROPHILS RELATIVE PERCENT: 73.3 % (ref 36–66)
SODIUM BLD-SCNC: 139 MMOL/L (ref 135–145)
TOTAL IMMATURE NEUTOROPHIL: 0.06 K/CU MM
TOTAL NUCLEATED RBC: 0 K/CU MM
TOTAL PROTEIN: 7.1 GM/DL (ref 6.4–8.2)
TROPONIN T: <0.01 NG/ML
TROPONIN T: <0.01 NG/ML
WBC # BLD: 11 K/CU MM (ref 4–10.5)

## 2023-02-14 PROCEDURE — G8427 DOCREV CUR MEDS BY ELIG CLIN: HCPCS | Performed by: NURSE PRACTITIONER

## 2023-02-14 PROCEDURE — 85379 FIBRIN DEGRADATION QUANT: CPT

## 2023-02-14 PROCEDURE — G8484 FLU IMMUNIZE NO ADMIN: HCPCS | Performed by: NURSE PRACTITIONER

## 2023-02-14 PROCEDURE — 6370000000 HC RX 637 (ALT 250 FOR IP): Performed by: EMERGENCY MEDICINE

## 2023-02-14 PROCEDURE — G8417 CALC BMI ABV UP PARAM F/U: HCPCS | Performed by: NURSE PRACTITIONER

## 2023-02-14 PROCEDURE — 84484 ASSAY OF TROPONIN QUANT: CPT

## 2023-02-14 PROCEDURE — 3017F COLORECTAL CA SCREEN DOC REV: CPT | Performed by: NURSE PRACTITIONER

## 2023-02-14 PROCEDURE — 99214 OFFICE O/P EST MOD 30 MIN: CPT | Performed by: NURSE PRACTITIONER

## 2023-02-14 PROCEDURE — 71045 X-RAY EXAM CHEST 1 VIEW: CPT

## 2023-02-14 PROCEDURE — 93010 ELECTROCARDIOGRAM REPORT: CPT | Performed by: INTERNAL MEDICINE

## 2023-02-14 PROCEDURE — 99285 EMERGENCY DEPT VISIT HI MDM: CPT

## 2023-02-14 PROCEDURE — 80053 COMPREHEN METABOLIC PANEL: CPT

## 2023-02-14 PROCEDURE — 93005 ELECTROCARDIOGRAM TRACING: CPT | Performed by: EMERGENCY MEDICINE

## 2023-02-14 PROCEDURE — 85025 COMPLETE CBC W/AUTO DIFF WBC: CPT

## 2023-02-14 PROCEDURE — 82962 GLUCOSE BLOOD TEST: CPT

## 2023-02-14 PROCEDURE — 4004F PT TOBACCO SCREEN RCVD TLK: CPT | Performed by: NURSE PRACTITIONER

## 2023-02-14 RX ORDER — LORAZEPAM 1 MG/1
1 TABLET ORAL ONCE
Status: COMPLETED | OUTPATIENT
Start: 2023-02-14 | End: 2023-02-14

## 2023-02-14 RX ADMIN — LORAZEPAM 1 MG: 1 TABLET ORAL at 17:15

## 2023-02-14 ASSESSMENT — ENCOUNTER SYMPTOMS
SHORTNESS OF BREATH: 0
COUGH: 0

## 2023-02-14 ASSESSMENT — PAIN SCALES - GENERAL: PAINLEVEL_OUTOF10: 3

## 2023-02-14 ASSESSMENT — PAIN DESCRIPTION - LOCATION: LOCATION: CHEST

## 2023-02-14 NOTE — ED NOTES
The following labs were labeled with appropriate pt sticker and tubed to lab:     [x] Blue     [x] Lavender   [] on ice  [x] Green/yellow  [] Green/black [] on ice  [x] Grey  [x] on ice  [] Yellow  [x] Red  [] Type/ Screen  [] ABG  [] VBG    [] COVID-19 swab    [] Rapid  [] PCR  [] Flu swab  [] Peds Viral Panel     [] Urine Sample  [] Fecal Sample  [] Pelvic Cultures  [] Blood Cultures  [] X 2  [] STREP Cultures         Pat Snow RN  02/14/23 4275

## 2023-02-14 NOTE — PROGRESS NOTES
CARDIOLOGY  NOTE    2023    Arn Daily (:  1970) is a 45 y.o. female,an established patient with Dr. Jose C Friend, here for evaluation of the following chief complaint(s):  Hyperlipidemia        SUBJECTIVE/OBJECTIVE:    HPI  Jael Crane has history as noted below. Jael Crane has been having on going dizziness and palpations that she has been to the ED for multiple times for very the last 8 weeks. She thinks that her anxiety has been severely  uncontrolled due to stopping all caffeine and sugar since December and she quit smoking 2023. She was diagnosed as a diabetic in 2022 and has lost > 15 lbs. Stress test in  showed no ischemia  She has sleep apnea but she is intolerant of CPAP. She has a dental appliance that she is trying for ROXI      Review of Systems   Constitutional:  Negative for fatigue and fever. Respiratory:  Negative for cough and shortness of breath. Cardiovascular:  Negative for chest pain, palpitations and leg swelling. Musculoskeletal:  Negative for arthralgias and gait problem. Neurological:  Negative for dizziness, syncope, weakness, light-headedness and headaches. Vitals:    23 0842   BP: 118/60   Pulse: 72   Weight: 242 lb (109.8 kg)   Height: 5' 3\" (1.6 m)       Wt Readings from Last 3 Encounters:   23 242 lb (109.8 kg)   23 246 lb 12.8 oz (111.9 kg)   23 245 lb (111.1 kg)       BP Readings from Last 3 Encounters:   23 118/60   23 107/68   23 121/64       Prior to Admission medications    Medication Sig Start Date End Date Taking?  Authorizing Provider   aluminum & magnesium hydroxide-simethicone (MAALOX) 200-200-20 MG/5ML SUSP suspension Take 5 mLs by mouth every 6 hours as needed for Indigestion 2/7/23 3/9/23 Yes Jocelynn Presley MD   atorvastatin (LIPITOR) 40 MG tablet Take 1 tablet by mouth nightly 23  Yes Renny Murphy MD   hydrALAZINE (APRESOLINE) 25 MG tablet Take 25 mg by mouth 4 times daily as needed   Yes Historical Provider, MD   metFORMIN (GLUCOPHAGE) 500 MG tablet Take 500 mg by mouth 2 times daily (with meals)   Yes Historical Provider, MD   FLUoxetine (PROZAC) 20 MG capsule Take 1 capsule by mouth daily 1/10/23  Yes Historical Provider, MD   rOPINIRole (REQUIP) 0.25 MG tablet TAKE 3 TABLETS BY MOUTH 2 HOURS BEFORE BED 3/21/22  Yes MD Elliott Goel Harris strip test 2 - 3 times a day by Transdermal route as directed 12/28/21  Yes Historical Provider, MD   [address] test UP  [facility] [address] 12/28/21  Yes Historical Provider, MD   Blood Glucose Monitoring Suppl ([address]) w/Device KIT USE AS DIRECTED 10/27/21  Yes Historical Provider, MD   Alcohol Swabs ( STERILE ALCOHOL PREP) PADS take by Topical route every day 12/28/21  Yes Historical Provider, MD   Cholecalciferol (VITAMIN D3) 25 MCG (1000 UT) TABS  10/11/19  Yes Historical Provider, MD   levothyroxine (SYNTHROID) 50 MCG tablet at bedtime  10/17/19  Yes Historical Provider, MD   meloxicam (MOBIC) 15 MG tablet  10/11/19  Yes Historical Provider, MD   omeprazole (PRILOSEC) 20 MG delayed release capsule Take 2 capsules by mouth Daily  Patient not taking: Reported on 2/14/2023 2/7/23 3/9/23  Jocelynn Presley MD   metoprolol succinate (TOPROL XL) 25 MG extended release tablet Take 1 tablet by mouth daily  Patient not taking: Reported on 2/14/2023 2/7/23   Renny Murphy MD   gabapentin (NEURONTIN) 300 MG capsule 300 mg at bedtime. Patient not taking: No sig reported 1/12/22   Historical Provider, MD   cyclobenzaprine (FLEXERIL) 10 MG tablet  1/12/22   Historical Provider, MD   FLUoxetine (PROZAC) 40 MG capsule at bedtime  12/28/21   Historical Provider, MD [place] 2.5 MCG/ACT AERS inhaler  11/22/19   Historical Provider, MD       Physical Exam  Vitals reviewed. Constitutional:       General: She is not in acute distress. Appearance: Normal appearance. She is obese.  She is not ill-appearing. HENT:      Head: Atraumatic. Neck:      Vascular: No carotid bruit. Cardiovascular:      Rate and Rhythm: Normal rate and regular rhythm. Pulses: Normal pulses. Heart sounds: Normal heart sounds. No murmur heard. Pulmonary:      Effort: Pulmonary effort is normal. No respiratory distress. Breath sounds: Normal breath sounds. Musculoskeletal:         General: No swelling or deformity. Cervical back: Neck supple. No muscular tenderness. Neurological:      Mental Status: She is alert. Health Maintenance   Topic Date Due    Pneumococcal 0-64 years Vaccine (1 - PCV) Never done    Lipids  Never done    Depression Monitoring  Never done    HIV screen  Never done    DTaP/Tdap/Td vaccine (1 - Tdap) Never done    Colorectal Cancer Screen  Never done    Shingles vaccine (1 of 2) Never done    Low dose CT lung screening  Never done    COVID-19 Vaccine (3 - Booster for Moderna series) 12/24/2021    Annual Wellness Visit (AWV)  01/30/2023    A1C test (Diabetic or Prediabetic)  05/04/2023    Breast cancer screen  09/08/2024    Flu vaccine  Completed    Hepatitis C screen  Completed    Hepatitis A vaccine  Aged Out    Hib vaccine  Aged Out    Meningococcal (ACWY) vaccine  Aged Out       Lab Review No results found for: CHOL, TRIG, HDL, LDLDIRECT     Stress test 2018  Summary   Supervising physician Dr. Rosendo Romberg   ECG portion of stress test is negative for ischemia by diagnostic criteria. Normal EF 77 % with normal ventricular contractility. No infarct or ischemia noted. Normal stress myocardial perfusion. This is a normal study. 2/5/2022   Summary   Normal perfusion   Normal EF >70 % with normal ventricular contractility. ASSESSMENT/PLAN:  SOB (shortness of breath)  Better. Palpations dizziness  Will check 30 day event monitor. Resolved recently. Possibly anxiety driven. Tobacco abuse  Stopped smoking 1/22/2022. She is doing very well.  She is using nicotine patches. Type 2 DM  She is doing much better  She is adjusting her diet and has lost a lot of weight in the last 2 months. ROXI  She is not using due to comfort. She is getting dental appliance adjusted as she has lost weight. Dyslipidemia  Ludivina Pierre had lab work recently,  Lipid profile was reviewed with patient. Counseled extensively and medication compliance urged. We discussed that for the  prevention of ASCVD our  goal is aggressive risk modification. Patient is encouraged to exercise even a brisk walk for 30 minutes  at least 3 to 4 times a week   Various goals were discussed and questions answered. Continue current medications. Appropriate prescriptions are addressed and refills ordered. Questions answered and patient verbalizes understanding. Call for any problems, questions, or concerns. Return in about 2 months (around 4/14/2023). An electronic signature was used to authenticate this note.     Electronically signed by MERE Camargo CNP on 2/14/2023 at 8:46 AM

## 2023-02-14 NOTE — PROGRESS NOTES
[facility] ([facility]YSICAL REHABILITATION [place]  Zackery Harden, John Stovall5  Phone: [phone]    Fax [phone]    Mariangel Jackson MD, Scottie Albrecht MD, [address] [facility], MD, MD Mariana Oseguera MD Ponciano Aline, MD Darnella Dupes, MD Armand Card, APRN      Jonathan Borden, APRHARI Lara, APRHARI Rosas, MERE Carrillo PA-C     CARDIOLOGY  NOTE    2023    Magalis Macias (:  1970) is a 45 y.o. female,an established patient with Dr. Maritza Philippe, here for evaluation of the following chief complaint(s):  Hyperlipidemia        SUBJECTIVE/OBJECTIVE:    HPI  Nakia Pozo has history as noted below. Nakia Pozo is planned for right knee replacement surgery soon in about 3 weeks she has history of ongoing shortness of breath. Stress test in  showed no ischemia  S he has significant bilateral external swelling. He is being evaluated by Dr. Mingo Abdullahi for bilateral ankle swelling and venous congestion. She reports intermittent chest pressure and pain when she ambulates or walks. She has sleep apnea but she is intolerant of CPAP        Review of Systems    Vitals:    23 0842   BP: 118/60   Pulse: 72   Weight: 242 lb (109.8 kg)   Height: 5' 3\" (1.6 m)       Wt Readings from Last 3 Encounters:   23 242 lb (109.8 kg)   23 246 lb 12.8 oz (111.9 kg)   23 245 lb (111.1 kg)       BP Readings from Last 3 Encounters:   23 118/60   23 107/68   23 121/64       Prior to Admission medications    Medication Sig Start Date End Date Taking?  Authorizing Provider   aluminum & magnesium hydroxide-simethicone (MAALOX) 200-200-20 MG/5ML SUSP suspension Take 5 mLs by mouth every 6 hours as needed for Indigestion 2/7/23 3/9/23 Yes Cesar Mendez MD   atorvastatin (LIPITOR) 40 MG tablet Take 1 tablet by mouth nightly 23  Yes All Woods MD   hydrALAZINE (APRESOLINE) 25 MG tablet Take 25 mg by mouth 4 times daily as needed   Yes Historical Provider, MD   metFORMIN (GLUCOPHAGE) 500 MG tablet Take 500 mg by mouth 2 times daily (with meals)   Yes Historical Provider, MD   FLUoxetine (PROZAC) 20 MG capsule Take 1 capsule by mouth daily 1/10/23  Yes Historical Provider, MD   rOPINIRole (REQUIP) 0.25 MG tablet TAKE 3 TABLETS BY MOUTH 2 HOURS BEFORE BED 3/21/22  Yes MD Tata Verde Burkitt strip test 2 - 3 times a day by Transdermal route as directed 12/28/21  Yes Historical Provider, MD   [address] test UP  [facility] [address] 12/28/21  Yes Historical Provider, MD   Blood Glucose Monitoring Suppl ([address]) w/Device KIT USE AS DIRECTED 10/27/21  Yes Historical Provider, MD   Alcohol Swabs ( STERILE ALCOHOL PREP) PADS take by Topical route every day 12/28/21  Yes Historical Provider, MD   Cholecalciferol (VITAMIN D3) 25 MCG (1000 UT) TABS  10/11/19  Yes Historical Provider, MD   levothyroxine (SYNTHROID) 50 MCG tablet at bedtime  10/17/19  Yes Historical Provider, MD   meloxicam (MOBIC) 15 MG tablet  10/11/19  Yes Historical Provider, MD   omeprazole (PRILOSEC) 20 MG delayed release capsule Take 2 capsules by mouth Daily  Patient not taking: Reported on 2/14/2023 2/7/23 3/9/23  Alondra Barahona MD   metoprolol succinate (TOPROL XL) 25 MG extended release tablet Take 1 tablet by mouth daily  Patient not taking: Reported on 2/14/2023 2/7/23   Jarrell Cleveland MD   gabapentin (NEURONTIN) 300 MG capsule 300 mg at bedtime.    Patient not taking: No sig reported 1/12/22   Historical Provider, MD   cyclobenzaprine (FLEXERIL) 10 MG tablet  1/12/22   Historical Provider, MD   FLUoxetine (PROZAC) 40 MG capsule at bedtime  12/28/21   Historical Provider, MD Hauseronis Mater 2.5 MCG/ACT AERS inhaler  11/22/19   Historical Provider, MD       Physical Exam    Health Maintenance   Topic Date Due    Pneumococcal 0-64 years Vaccine (1 - PCV) Never done    Lipids  Never done    Depression Monitoring  Never done    HIV screen  Never done    DTaP/Tdap/Td vaccine (1 - Tdap) Never done    Colorectal Cancer Screen  Never done    Shingles vaccine (1 of 2) Never done    Low dose CT lung screening  Never done    COVID-19 Vaccine (3 - Booster for Moderna series) 12/24/2021    Annual Wellness Visit (AWV)  01/30/2023    A1C test (Diabetic or Prediabetic)  05/04/2023    Breast cancer screen  09/08/2024    Flu vaccine  Completed    Hepatitis C screen  Completed    Hepatitis A vaccine  Aged Out    Hib vaccine  Aged Out    Meningococcal (ACWY) vaccine  Aged Out       Lab Review No results found for: CHOL, TRIG, HDL, LDLDIRECT     Stress test 2018  Summary   Supervising physician Dr. Yoly Zuluaga   ECG portion of stress test is negative for ischemia by diagnostic criteria. Normal EF 77 % with normal ventricular contractility. No infarct or ischemia noted. Normal stress myocardial perfusion. This is a normal study. 2/5/2022   Summary   Normal perfusion   Normal EF >70 % with normal ventricular contractility. ASSESSMENT/PLAN:  SOB (shortness of breath)  Better. Palpations dizziness  Will check 30 day event monitor. Resolved recently. Possibly anxiety driven. Tobacco abuse  Stopped smoking 1/22/2022. She is doing very well. She is using nicotine patches. Type 2 DM  She is doing much better  She is adjusting her diet and has lost a lot of weight in the last 2 months. ROXI  She is not using due to comfort. She is getting dental appliance adjusted as she has lost weight. Dyslipidemia  Mina Malhotra had lab work recently,  Lipid profile was reviewed with patient. Counseled extensively and medication compliance urged. We discussed that for the  prevention of ASCVD our  goal is aggressive risk modification. Patient is encouraged to exercise even a brisk walk for 30 minutes  at least 3 to 4 times a week   Various goals were discussed and questions answered. Continue current medications. Appropriate prescriptions are addressed and refills ordered. Questions answered and patient verbalizes understanding. Call for any problems, questions, or concerns. No follow-ups on file. An electronic signature was used to authenticate this note.     Electronically signed by MERE Anton CNP on 2/14/2023 at 8:46 AM

## 2023-02-14 NOTE — ED NOTES
Patient arrives via [facility] with c/o chest pain that started this morning. Patient was given nitro and aspirin in route to the hospital, medics state nitro made pain go from 8 to a 3. AOX4 Respirations equal and unlabored, skin PWD.      Tristin Mcclendon, KHALIDA  02/14/23 [id number]

## 2023-02-14 NOTE — ED PROVIDER NOTES
Triage Chief Complaint:   Chest Pain    [place]:  Fiona Isaac is a 45 y.o. female that presents with chest pain. Patient was in baseline state of health until just prior to arrival when the above started. Patient reports sensation of \"palpitations\" in her central chest that do radiate at times into her left arm. Patient reports that she feels very anxious and begins to \"panic\". Patient does self admit to anxiety issues for a long time that have been worse since she was diagnosed with diabetes this past December. Patient also details numerous life changes including stopping smoking, stopping carbohydrate/sugar intake and stopping all caffeine intake. Patient has been losing weight and has been happy with her lifestyle modifications however she is concerned that they are also stressing out her body. Patient actually saw her cardiologist today without having the above symptoms and had reassuring visit with plans to continue current course. No known coronary disease. No stents. Patient has had prior stress testing just a few days ago which was negative.   No history of DVT or PE.    ROS:  General:  No fevers, no chills, no weakness  Eyes:  No recent vison changes, no discharge  ENT:  No sore throat, no nasal congestion  Cardiovascular:  + chest pain, no palpitations  Respiratory:  No shortness of breath, no cough, no wheezing  Gastrointestinal:  No pain, no nausea, no vomiting, no diarrhea  Musculoskeletal:  No muscle pain, no joint pain  Skin:  No rash, no pruritis, no easy bruising  Neurologic:  No speech problems, no headache, no extremity numbness, no extremity tingling, no extremity weakness  Psychiatric:  + anxiety, + panic  Genitourinary:  No dysuria, no increased urinary frequency  Extremities:  no edema, no pain    Past Medical History:   Diagnosis Date    Anxiety     COPD (chronic obstructive pulmonary disease) (HCC)     Hypoglycemia     history    Panic attacks     Primary osteoarthritis of right knee     Separation anxiety     TIA (transient ischemic attack)      Past Surgical History:   Procedure Laterality Date     SECTION      CHOLECYSTECTOMY      HYSTERECTOMY (CERVIX STATUS UNKNOWN)      KNEE CARTILAGE SURGERY      R knee 2002    OVARY REMOVAL      TOTAL KNEE ARTHROPLASTY Right 2022    RIGHT KNEE TOTAL ARTHROPLASTY ROBOTIC performed by Marilu Artis MD at [address] [place] [address]       Family History   Problem Relation Age of Onset    Cancer Mother     Cancer Father     Breast Cancer Neg Hx     Ovarian Cancer Neg Hx      Social History     Socioeconomic History    Marital status:      Spouse name: Not on file    Number of children: Not on file    Years of education: Not on file    Highest education level: Not on file   Occupational History    Not on file   Tobacco Use    Smoking status: Former     Packs/day: 1.00     Years: 30.00     Pack years: 30.00     Types: Cigarettes    Smokeless tobacco: Never   Vaping Use    Vaping Use: Never used   Substance and Sexual Activity    Alcohol use: No     Comment: occasional    Drug use: Never    Sexual activity: Not Currently   Other Topics Concern    Not on file   Social History Narrative    Not on file     Social Determinants of Health     Financial Resource Strain: Not on file   Food Insecurity: Not on file   Transportation Needs: Not on file   Physical Activity: Not on file   Stress: Not on file   Social Connections: Not on file   Intimate Partner Violence: Not on file   Housing Stability: Not on file     No current facility-administered medications for this encounter.      Current Outpatient Medications   Medication Sig Dispense Refill    omeprazole (PRILOSEC) 20 MG delayed release capsule Take 2 capsules by mouth Daily (Patient not taking: Reported on 2023) 60 capsule 0    aluminum & magnesium hydroxide-simethicone (MAALOX) 200-200-20 MG/5ML SUSP suspension Take 5 mLs by mouth every 6 hours as needed for Indigestion 355 mL 0 atorvastatin (LIPITOR) 40 MG tablet Take 1 tablet by mouth nightly 30 tablet 3    metoprolol succinate (TOPROL XL) 25 MG extended release tablet Take 1 tablet by mouth daily (Patient not taking: Reported on 2/14/2023) 30 tablet 3    hydrALAZINE (APRESOLINE) 25 MG tablet Take 25 mg by mouth 4 times daily as needed      metFORMIN (GLUCOPHAGE) 500 MG tablet Take 500 mg by mouth 2 times daily (with meals)      FLUoxetine (PROZAC) 20 MG capsule Take 1 capsule by mouth daily      rOPINIRole (REQUIP) 0.25 MG tablet TAKE 3 TABLETS BY MOUTH 2 HOURS BEFORE BED 90 tablet 1    gabapentin (NEURONTIN) 300 MG capsule 300 mg at bedtime. (Patient not taking: No sig reported)      cyclobenzaprine (FLEXERIL) 10 MG tablet  (Patient not taking: No sig reported)      FLUoxetine (PROZAC) 40 MG capsule at bedtime       ONETOUCH VERIO strip test 2 - 3 times a day by Transdermal route as directed      OneTouch Delica Lancets 37Z MISC test UP TO THREE TIMES DAILY      Blood Glucose Monitoring Suppl (ONETOUCH VERIO FLEX SYSTEM) w/Device KIT USE AS DIRECTED      Alcohol Swabs ( STERILE ALCOHOL PREP) PADS take by Topical route every day      SPIRIVA RESPIMAT 2.5 MCG/ACT AERS inhaler  (Patient not taking: No sig reported)  2    Cholecalciferol (VITAMIN D3) 25 MCG (1000 UT) TABS   2    levothyroxine (SYNTHROID) 50 MCG tablet at bedtime   2    meloxicam (MOBIC) 15 MG tablet   2     No Known Allergies    Nursing Notes Reviewed    Physical Exam:  ED Triage Vitals   Enc Vitals Group      BP 02/14/23 1542 (!) 111/44      Heart Rate 02/14/23 1542 61      Resp 02/14/23 1542 18      Temp 02/14/23 1542 97.5 °F (36.4 °C)      Temp Source 02/14/23 1542 Temporal      SpO2 02/14/23 1542 97 %      Weight 02/14/23 1541 245 lb (111.1 kg)      Height 02/14/23 1541 5' 3\" (1.6 m)      Head Circumference --       Peak Flow --       Pain Score --       Pain Loc --       Pain Edu? --       Excl.  in [address]? --        My pulse ox interpretation is - normal    General appearance:  No acute distress. Very pleasant. Appears slightly deconditioned. Slightly anxious. Skin:  Warm. Dry. No diaphoresis. Eye:  Extraocular movements intact. Ears, nose, mouth and throat:  Oral mucosa moist   Neck:  Trachea midline. Extremity:  Normal ROM. No bilateral lower extremity edema. No calf tenderness to palpation. Heart:  Regular rate and rhythm, normal S1 & S2, no extra heart sounds. Perfusion:  Intact  Respiratory:  Lungs clear to auscultation bilaterally. Respirations nonlabored. Speaking clearly in full sentences. No respiratory distress. Abdominal:  Normal bowel sounds. Soft. Nontender. Non distended. Back:  No CVA tenderness to palpation     Neurological:  Alert and oriented times 3. No focal neuro deficits. Psychiatric: Anxious. Rifle thoughts per good eye contact.     I have reviewed and interpreted all of the currently available lab results from this visit (if applicable):  Results for orders placed or performed during the hospital encounter of 02/14/23   CBC with Auto Differential   Result Value Ref Range    WBC 11.0 (H) 4.0 - 10.5 K/CU MM    RBC 4.67 4.2 - 5.4 M/CU MM    Hemoglobin 13.5 12.5 - 16.0 GM/DL    Hematocrit 41.0 37 - 47 %    MCV 87.8 78 - 100 FL    MCH 28.9 27 - 31 PG    MCHC 32.9 32.0 - 36.0 %    RDW 12.9 11.7 - 14.9 %    Platelets 826 562 - 272 K/CU MM    MPV 12.3 (H) 7.5 - 11.1 FL    Differential Type AUTOMATED DIFFERENTIAL     Segs Relative 73.3 (H) 36 - 66 %    Lymphocytes % 17.6 (L) 24 - 44 %    Monocytes % 7.0 (H) 0 - 4 %    Eosinophils % 1.1 0 - 3 %    Basophils % 0.5 0 - 1 %    Segs Absolute 8.0 K/CU MM    Lymphocytes Absolute 1.9 K/CU MM    Monocytes Absolute 0.8 K/CU MM    Eosinophils Absolute 0.1 K/CU MM    Basophils Absolute 0.1 K/CU MM    Nucleated RBC % 0.0 %    Total Nucleated RBC 0.0 K/CU MM    Total Immature Neutrophil 0.06 K/CU MM    Immature Neutrophil % 0.5 (H) 0 - 0.43 %   Comprehensive Metabolic Panel   Result Value Ref Range    Sodium 139 135 - 145 MMOL/L    Potassium 3.9 3.5 - 5.1 MMOL/L    Chloride 105 99 - 110 mMol/L    CO2 23 21 - 32 MMOL/L    BUN 16 6 - 23 MG/DL    Creatinine 0.6 0.6 - 1.1 MG/DL    Est, Glom Filt Rate >60 >60 mL/min/1.73m2    Glucose 101 (H) 70 - 99 MG/DL    Calcium 9.7 8.3 - 10.6 MG/DL    Albumin 4.5 3.4 - 5.0 GM/DL    Total Protein 7.1 6.4 - 8.2 GM/DL    Total Bilirubin 0.6 0.0 - 1.0 MG/DL    ALT 63 (H) 10 - 40 U/L    AST 29 15 - 37 IU/L    Alkaline Phosphatase 73 40 - 129 IU/L    Anion Gap 11 4 - 16   Troponin   Result Value Ref Range    Troponin T <0.010 <0.01 NG/ML   D-Dimer, Quantitative   Result Value Ref Range    D-Dimer, Quant <200 <230 NG/mL(DDU)   EKG 12 Lead   Result Value Ref Range    Ventricular Rate 65 BPM    Atrial Rate 65 BPM    P-R Interval 138 ms    QRS Duration 86 ms    Q-T Interval 406 ms    QTc Calculation (Bazett) 422 ms    P Axis 43 degrees    R Axis 9 degrees    T Axis 15 degrees    Diagnosis       Normal sinus rhythm  Normal ECG  When compared with ECG of 07-FEB-2023 18:04,  No significant change was found        Radiographs (if obtained):  [] The following radiograph was interpreted by myself in the absence of a radiologist:   [x] Radiologist's Report Reviewed:  XR CHEST PORTABLE   Final Result   No acute abnormality. EKG (if obtained): (All EKG's are interpreted by myself in the absence of a cardiologist)  12 lead EKG per my interpretation:  Normal Sinus Rhythm at 65  Axis is   Normal  QTc is  within an acceptable range  There is no specific T wave changes appreciated. There is no specific ST wave changes appreciated. No STEMI    Prior EKG to compare with was available and no clinically significant change in over morphology compared to prior.       Chart review shows recent radiographs:  XR CHEST (2 VW)    Result Date: 2/7/2023  EXAMINATION: TWO XRAY VIEWS OF THE CHEST 2/7/2023 5:50 pm COMPARISON: 02/04/2023 HISTORY: ORDERING SYSTEM PROVIDED HISTORY: Chest Pain TECHNOLOGIST PROVIDED HISTORY: Reason for exam:->Chest Pain Reason for Exam: chest pain FINDINGS: The lungs are without acute focal process. There is no effusion or pneumothorax. The cardiomediastinal silhouette is stable. The osseous structures are stable. No acute process. XR CHEST PORTABLE    Result Date: 2/14/2023  EXAMINATION: ONE XRAY VIEW OF THE CHEST 2/14/2023 3:49 pm COMPARISON: 02/07/2023 HISTORY: ORDERING SYSTEM PROVIDED HISTORY: chest pain TECHNOLOGIST PROVIDED HISTORY: Reason for exam:->chest pain Reason for Exam: chest pain FINDINGS: No lung infiltrate or consolidation. No pneumothorax or pleural effusion. Heart size is normal.     No acute abnormality. XR CHEST PORTABLE    Result Date: 2/4/2023  EXAMINATION: ONE XRAY VIEW OF THE CHEST 2/4/2023 10:00 am COMPARISON: 02/01/2023 HISTORY: ORDERING SYSTEM PROVIDED HISTORY: chest pain TECHNOLOGIST PROVIDED HISTORY: Reason for exam:->chest pain Reason for Exam: chest pain FINDINGS: The lungs are without acute focal process. There is no effusion or pneumothorax. The cardiomediastinal silhouette is stable. The osseous structures are stable. No acute process. XR CHEST PORTABLE    Result Date: 2/1/2023  EXAMINATION: ONE XRAY VIEW OF THE CHEST 2/1/2023 11:18 am COMPARISON: 02/16/2022 HISTORY: ORDERING SYSTEM PROVIDED HISTORY: Chest pain TECHNOLOGIST PROVIDED HISTORY: Reason for exam:->Chest pain Reason for Exam: chest pain FINDINGS: The heart size is enlarged. The pulmonary vasculature is mildly congested. No acute infiltrates are seen. No pneumothoraces are noted. 1. Cardiomegaly with mild vascular congestion.      NM MYOCARDIAL SPECT REST EXERCISE OR RX    Result Date: 2/6/2023  Cardiac Perfusion Imaging   Demographics   Patient Name      Nely CARTAGENA Date of study        02/06/2023   Date of Birth     1970         Gender               Female   Age               45 year(s)         Race                  Patient Number    [id number]         Room Number          3125   Visit Number      [id number]          Height               63 inches   Corporate ID      [id number]           Weight               246 pounds   Accession Number  [id number]                                        NM Technologist      [facility]   Ordering          Amara Mansfield   Interpreting         Joie Jacobs MD  Physician         APRN-CNP           Cardiologist   Conclusions   Summary  Normal perfusion  Normal EF >70 % with normal ventricular contractility.   Recommendation  Routine medical follow up.   Signatures   ------------------------------------------------------------------  Electronically signed by Joie Jacobs MD (Interpreting  cardiologist) on 02/06/2023 at 12:02  ------------------------------------------------------------------  Procedure Procedure Type:   Nuclear Stress Test:Pharmacological, Myocardial Perfusion Imaging with  Pharm, NM MYOCARDIAL SPECT REST EXERCISE OR RX  Indications: Chest pain.  Risk Factors   The patient risk factors include:obesity, Current - Every day tobacco use,  hypercholesterolemia, diabetes mellitus and chronic lung disease.  Stress Protocols   Resting HR:65 bpm  Resting BP:124/65 mmHg  Stress Protocol:Pharmacologic - Lexiscan  Peak HR:98 bpm                   HR/BP product:07356  Peak BP:124/65 mmHg  Predicted HR: 166 bpm  % of predicted HR: 59   Exercise duration: 01:01 min  Reason for termination:Completed   Symptoms  No symptoms with stress.  Procedure Medications   - Lexiscan I.V. bolus (over 15sec.) 0.4 mg admininstered @ 02/06/2023 09:40.  Imaging Protocols   Rest                             Stress   Isotope:Sestamibi 99mTc          Isotope: Sestamibi 99mTc  Isotope dose:10.8 mCi            Isotope dose:32.9 mCi  Administration route: I.V.       Administration route: I.V.  Injection Date:02/06/2023 08:00  Injection Date:02/06/2023 09:40  Scan Date:02/06/2023 08:45       Scan Date:02/06/2023  10:25   Technique:        SPECT          Technique:        Gated                                                     SPECT   Procedure Description   Upon patient arrival, the patient is identified using two identifiers and  the physician order is verified. An IV is established and 8-11mCi of 99mTc  Sestamibi is intravenously injected and followed with 10mL 0.9% Normal  Saline flush. A circulation period of 45 minutes occurs prior to resting  SPECT imaging. After imaging is complete the patient is escorted to the  stress lab. The patient is connected to the ECG and blood pressure is  measured. The RN starts the stress portion of the exam and rapidly  intravenously injects Lexiscan (regadenosine) 0.4mg over a period of 10 to15  seconds and follows with 5mL 0.9% Normal Saline flush. Immediately following  the Nuclear Technologist intravenously injects 22-33mCi of 99mTc Sestamibi  and 5mL 0.9% Normal Saline flush. After completion, recovery, and removal of  the IV, the patient rests during the second circulation period of 45  minutes. Final stress SPECT gated imaging is performed. The patient may  return home or to their room after stress imaging. The images are processed  and final charting is completed and sent to the appropriate cardiologist for  interpretation and reporting. Perfusion Interpretation   Normal perfusion  Normal EF >70 % with normal ventricular contractility. Imaging Results    Summed scores     - Summed stress score: 4     - Summed rest score: 0     - Summed difference score:    4   Rest ejection  Ejection fraction:74 %  EDV :81 ml  ESV :21 ml  Stroke volume :60 ml  Medical History   Accession#:  [id number]  Admission Data Admission date: 02/04/2023 Admission Time: 09:32 Hospital Status: Inpatient. MDM:  Pt presents as above. Emergent conditions considered. Presentation prompted initial labs, EKG and imaging as above. EKG with normal sinus rhythm as above.     Patient already received full dose aspirin prior to arrival.  Oral Ativan is given here. Patient's chest x-ray is read by radiology as negative for acute abnormality making clinically significant pneumonia, pneumothorax or aortic pathology less likely. The patient's initial troponin is within the normal range. Patient's EKG did not show any acute diagnostic ischemic changes as above. CBC and CMP without clinically significant derangement. The patient was given medications, is starting to feel better. Based on the risk stratification for acute coronary syndrome, the patient's risk profile is very low and thus does not necessitate admission for further evaluation at this time. HEART score of 3. Decision made to obtain delta troponin. Delta troponin is negative. I have considered the diagnosis of pulmonary embolism and aortic dissection, but based on the patient's history, clinical exam, and studies, the likelihood for these diagnosis is below the threshold for further testing in the emergency department. I have given the patient instructions to followup with their doctor in the next 2-3 days. They have been asked to return to the ED should they develop worsening symptoms. I discussed specific signs and symptoms and when to return to the emergency department as well as the need for close outpatient follow-up. Questions sought and answered with the patient. They voice understanding and agree with plan. Is this patient to be included in the SEP-1 Core Measure due to severe sepsis or septic shock? No   Exclusion criteria - the patient is NOT to be included for SEP-1 Core Measure due to: Infection is not suspected      Clinical Impression:  1. Chest pain, unspecified type      Disposition referral (if applicable):  No follow-up provider specified.   Disposition medications (if applicable):  New Prescriptions    No medications on file       Comment: Please note this report has been produced using speech recognition software and may contain errors related to that system including errors in grammar, punctuation, and spelling, as well as words and phrases that may be inappropriate. If there are any questions or concerns please feel free to contact the dictating provider for clarification.         Jose Melo MD  02/15/23 8066

## 2023-02-15 ENCOUNTER — HOSPITAL ENCOUNTER (OUTPATIENT)
Age: 53
Setting detail: OBSERVATION
Discharge: NURSING, REHAB OR LONG-TERM CARE | End: 2023-02-18
Attending: EMERGENCY MEDICINE | Admitting: STUDENT IN AN ORGANIZED HEALTH CARE EDUCATION/TRAINING PROGRAM
Payer: MEDICARE

## 2023-02-15 ENCOUNTER — APPOINTMENT (OUTPATIENT)
Dept: CT IMAGING | Age: 53
End: 2023-02-15
Payer: MEDICARE

## 2023-02-15 DIAGNOSIS — R55 SYNCOPE AND COLLAPSE: Primary | ICD-10-CM

## 2023-02-15 DIAGNOSIS — F41.1 GAD (GENERALIZED ANXIETY DISORDER): ICD-10-CM

## 2023-02-15 LAB
ALBUMIN SERPL-MCNC: 4.3 GM/DL (ref 3.4–5)
ALP BLD-CCNC: 70 IU/L (ref 40–129)
ALT SERPL-CCNC: 54 U/L (ref 10–40)
ANION GAP SERPL CALCULATED.3IONS-SCNC: 13 MMOL/L (ref 4–16)
AST SERPL-CCNC: 22 IU/L (ref 15–37)
BASOPHILS ABSOLUTE: 0.1 K/CU MM
BASOPHILS RELATIVE PERCENT: 0.5 % (ref 0–1)
BILIRUB SERPL-MCNC: 0.6 MG/DL (ref 0–1)
BUN SERPL-MCNC: 25 MG/DL (ref 6–23)
CALCIUM SERPL-MCNC: 9.5 MG/DL (ref 8.3–10.6)
CHLORIDE BLD-SCNC: 104 MMOL/L (ref 99–110)
CO2: 23 MMOL/L (ref 21–32)
CREAT SERPL-MCNC: 0.7 MG/DL (ref 0.6–1.1)
DIFFERENTIAL TYPE: ABNORMAL
EOSINOPHILS ABSOLUTE: 0.2 K/CU MM
EOSINOPHILS RELATIVE PERCENT: 2.1 % (ref 0–3)
GFR SERPL CREATININE-BSD FRML MDRD: >60 ML/MIN/1.73M2
GLUCOSE BLD-MCNC: 84 MG/DL (ref 70–99)
GLUCOSE BLD-MCNC: 94 MG/DL (ref 70–99)
GLUCOSE SERPL-MCNC: 93 MG/DL (ref 70–99)
HCT VFR BLD CALC: 42.6 % (ref 37–47)
HEMOGLOBIN: 14.2 GM/DL (ref 12.5–16)
IMMATURE NEUTROPHIL %: 0.4 % (ref 0–0.43)
LYMPHOCYTES ABSOLUTE: 3.5 K/CU MM
LYMPHOCYTES RELATIVE PERCENT: 31.9 % (ref 24–44)
MCH RBC QN AUTO: 28.9 PG (ref 27–31)
MCHC RBC AUTO-ENTMCNC: 33.3 % (ref 32–36)
MCV RBC AUTO: 86.8 FL (ref 78–100)
MONOCYTES ABSOLUTE: 0.7 K/CU MM
MONOCYTES RELATIVE PERCENT: 6.7 % (ref 0–4)
NUCLEATED RBC %: 0 %
PDW BLD-RTO: 13 % (ref 11.7–14.9)
PLATELET # BLD: 179 K/CU MM (ref 140–440)
PMV BLD AUTO: 12.4 FL (ref 7.5–11.1)
POTASSIUM SERPL-SCNC: 3.7 MMOL/L (ref 3.5–5.1)
RBC # BLD: 4.91 M/CU MM (ref 4.2–5.4)
SEGMENTED NEUTROPHILS ABSOLUTE COUNT: 6.5 K/CU MM
SEGMENTED NEUTROPHILS RELATIVE PERCENT: 58.4 % (ref 36–66)
SODIUM BLD-SCNC: 140 MMOL/L (ref 135–145)
TOTAL IMMATURE NEUTOROPHIL: 0.04 K/CU MM
TOTAL NUCLEATED RBC: 0 K/CU MM
TOTAL PROTEIN: 6.8 GM/DL (ref 6.4–8.2)
TROPONIN T: <0.01 NG/ML
WBC # BLD: 11.1 K/CU MM (ref 4–10.5)

## 2023-02-15 PROCEDURE — 2580000003 HC RX 258: Performed by: EMERGENCY MEDICINE

## 2023-02-15 PROCEDURE — 70450 CT HEAD/BRAIN W/O DYE: CPT

## 2023-02-15 PROCEDURE — 83036 HEMOGLOBIN GLYCOSYLATED A1C: CPT

## 2023-02-15 PROCEDURE — 99285 EMERGENCY DEPT VISIT HI MDM: CPT

## 2023-02-15 PROCEDURE — 80053 COMPREHEN METABOLIC PANEL: CPT

## 2023-02-15 PROCEDURE — 82962 GLUCOSE BLOOD TEST: CPT

## 2023-02-15 PROCEDURE — 6360000004 HC RX CONTRAST MEDICATION: Performed by: EMERGENCY MEDICINE

## 2023-02-15 PROCEDURE — 84484 ASSAY OF TROPONIN QUANT: CPT

## 2023-02-15 PROCEDURE — 85025 COMPLETE CBC W/AUTO DIFF WBC: CPT

## 2023-02-15 PROCEDURE — 70498 CT ANGIOGRAPHY NECK: CPT

## 2023-02-15 PROCEDURE — 93005 ELECTROCARDIOGRAM TRACING: CPT | Performed by: EMERGENCY MEDICINE

## 2023-02-15 RX ORDER — 0.9 % SODIUM CHLORIDE 0.9 %
1000 INTRAVENOUS SOLUTION INTRAVENOUS ONCE
Status: COMPLETED | OUTPATIENT
Start: 2023-02-15 | End: 2023-02-15

## 2023-02-15 RX ADMIN — SODIUM CHLORIDE 1000 ML: 9 INJECTION, SOLUTION INTRAVENOUS at 21:39

## 2023-02-15 RX ADMIN — IOPAMIDOL 75 ML: 755 INJECTION, SOLUTION INTRAVENOUS at 22:58

## 2023-02-15 ASSESSMENT — PAIN - FUNCTIONAL ASSESSMENT: PAIN_FUNCTIONAL_ASSESSMENT: NONE - DENIES PAIN

## 2023-02-15 NOTE — ED NOTES
The following labs were labeled with appropriate pt sticker and tubed to lab:     [] Blue     [] Lavender   [] on ice  [x] Green/yellow  [] Green/black [] on ice  [] Sahra Cough  [] on ice  [] Yellow  [] Red  [] Type/ Screen  [] ABG  [] VBG    [] COVID-19 swab    [] Rapid  [] PCR  [] Flu swab  [] Peds Viral Panel     [] Urine Sample  [] Fecal Sample  [] Pelvic Cultures  [] Blood Cultures  [] X 2  [] STREP Cultures       Tivis Sample, RN  02/14/23 1958

## 2023-02-15 NOTE — ED NOTES
Pt discharged from ED at this time with all necessary paperwork. All questions answered at this time and discharge instructions reviewed. Pt ambulates to waiting room.       Iona Ventura RN  02/14/23 2752

## 2023-02-16 PROBLEM — R55 SYNCOPE AND COLLAPSE: Status: ACTIVE | Noted: 2023-02-16

## 2023-02-16 LAB
CHOLEST SERPL-MCNC: 94 MG/DL
EKG ATRIAL RATE: 69 BPM
EKG DIAGNOSIS: NORMAL
EKG P AXIS: 62 DEGREES
EKG P-R INTERVAL: 178 MS
EKG Q-T INTERVAL: 370 MS
EKG QRS DURATION: 62 MS
EKG QTC CALCULATION (BAZETT): 396 MS
EKG R AXIS: 3 DEGREES
EKG T AXIS: 11 DEGREES
EKG VENTRICULAR RATE: 69 BPM
ESTIMATED AVERAGE GLUCOSE: 131 MG/DL
GLUCOSE BLD-MCNC: 104 MG/DL (ref 70–99)
GLUCOSE BLD-MCNC: 108 MG/DL (ref 70–99)
GLUCOSE BLD-MCNC: 108 MG/DL (ref 70–99)
GLUCOSE BLD-MCNC: 109 MG/DL (ref 70–99)
GLUCOSE BLD-MCNC: 92 MG/DL (ref 70–99)
HBA1C MFR BLD: 6.2 % (ref 4.2–6.3)
HDLC SERPL-MCNC: 41 MG/DL
LDLC SERPL CALC-MCNC: 31 MG/DL
LV EF: 60 %
LVEF MODALITY: NORMAL
TRIGL SERPL-MCNC: 112 MG/DL
TROPONIN T: <0.01 NG/ML

## 2023-02-16 PROCEDURE — 96372 THER/PROPH/DIAG INJ SC/IM: CPT

## 2023-02-16 PROCEDURE — 99222 1ST HOSP IP/OBS MODERATE 55: CPT | Performed by: INTERNAL MEDICINE

## 2023-02-16 PROCEDURE — 6370000000 HC RX 637 (ALT 250 FOR IP): Performed by: STUDENT IN AN ORGANIZED HEALTH CARE EDUCATION/TRAINING PROGRAM

## 2023-02-16 PROCEDURE — 97535 SELF CARE MNGMENT TRAINING: CPT

## 2023-02-16 PROCEDURE — 82962 GLUCOSE BLOOD TEST: CPT

## 2023-02-16 PROCEDURE — 84484 ASSAY OF TROPONIN QUANT: CPT

## 2023-02-16 PROCEDURE — 1200000000 HC SEMI PRIVATE

## 2023-02-16 PROCEDURE — G0378 HOSPITAL OBSERVATION PER HR: HCPCS

## 2023-02-16 PROCEDURE — 81003 URINALYSIS AUTO W/O SCOPE: CPT

## 2023-02-16 PROCEDURE — 2580000003 HC RX 258: Performed by: STUDENT IN AN ORGANIZED HEALTH CARE EDUCATION/TRAINING PROGRAM

## 2023-02-16 PROCEDURE — 97166 OT EVAL MOD COMPLEX 45 MIN: CPT

## 2023-02-16 PROCEDURE — 80061 LIPID PANEL: CPT

## 2023-02-16 PROCEDURE — 93010 ELECTROCARDIOGRAM REPORT: CPT | Performed by: INTERNAL MEDICINE

## 2023-02-16 PROCEDURE — 2500000003 HC RX 250 WO HCPCS: Performed by: NURSE PRACTITIONER

## 2023-02-16 PROCEDURE — 93306 TTE W/DOPPLER COMPLETE: CPT

## 2023-02-16 PROCEDURE — 97162 PT EVAL MOD COMPLEX 30 MIN: CPT

## 2023-02-16 PROCEDURE — 97116 GAIT TRAINING THERAPY: CPT

## 2023-02-16 PROCEDURE — 36415 COLL VENOUS BLD VENIPUNCTURE: CPT

## 2023-02-16 PROCEDURE — 94761 N-INVAS EAR/PLS OXIMETRY MLT: CPT

## 2023-02-16 PROCEDURE — 6360000002 HC RX W HCPCS: Performed by: STUDENT IN AN ORGANIZED HEALTH CARE EDUCATION/TRAINING PROGRAM

## 2023-02-16 RX ORDER — SODIUM CHLORIDE 9 MG/ML
INJECTION, SOLUTION INTRAVENOUS PRN
Status: DISCONTINUED | OUTPATIENT
Start: 2023-02-16 | End: 2023-02-18 | Stop reason: HOSPADM

## 2023-02-16 RX ORDER — ENOXAPARIN SODIUM 100 MG/ML
30 INJECTION SUBCUTANEOUS 2 TIMES DAILY
Status: DISCONTINUED | OUTPATIENT
Start: 2023-02-16 | End: 2023-02-18 | Stop reason: HOSPADM

## 2023-02-16 RX ORDER — ACETAMINOPHEN 325 MG/1
650 TABLET ORAL EVERY 4 HOURS PRN
Status: DISCONTINUED | OUTPATIENT
Start: 2023-02-16 | End: 2023-02-18 | Stop reason: HOSPADM

## 2023-02-16 RX ORDER — DEXTROSE MONOHYDRATE 100 MG/ML
INJECTION, SOLUTION INTRAVENOUS CONTINUOUS PRN
Status: DISCONTINUED | OUTPATIENT
Start: 2023-02-16 | End: 2023-02-18 | Stop reason: HOSPADM

## 2023-02-16 RX ORDER — ONDANSETRON 4 MG/1
4 TABLET, ORALLY DISINTEGRATING ORAL EVERY 8 HOURS PRN
Status: DISCONTINUED | OUTPATIENT
Start: 2023-02-16 | End: 2023-02-18 | Stop reason: HOSPADM

## 2023-02-16 RX ORDER — ATORVASTATIN CALCIUM 40 MG/1
40 TABLET, FILM COATED ORAL NIGHTLY
Status: DISCONTINUED | OUTPATIENT
Start: 2023-02-16 | End: 2023-02-18 | Stop reason: HOSPADM

## 2023-02-16 RX ORDER — HYDROXYZINE HYDROCHLORIDE 50 MG/ML
50 INJECTION, SOLUTION INTRAMUSCULAR ONCE
Status: DISCONTINUED | OUTPATIENT
Start: 2023-02-16 | End: 2023-02-16

## 2023-02-16 RX ORDER — HYDROXYZINE HYDROCHLORIDE 25 MG/1
25 TABLET, FILM COATED ORAL EVERY 6 HOURS PRN
Status: DISCONTINUED | OUTPATIENT
Start: 2023-02-16 | End: 2023-02-18 | Stop reason: HOSPADM

## 2023-02-16 RX ORDER — INSULIN LISPRO 100 [IU]/ML
0-4 INJECTION, SOLUTION INTRAVENOUS; SUBCUTANEOUS
Status: DISCONTINUED | OUTPATIENT
Start: 2023-02-16 | End: 2023-02-18 | Stop reason: HOSPADM

## 2023-02-16 RX ORDER — METOPROLOL SUCCINATE 25 MG/1
25 TABLET, EXTENDED RELEASE ORAL DAILY
Status: DISCONTINUED | OUTPATIENT
Start: 2023-02-16 | End: 2023-02-18 | Stop reason: HOSPADM

## 2023-02-16 RX ORDER — SODIUM CHLORIDE 0.9 % (FLUSH) 0.9 %
5-40 SYRINGE (ML) INJECTION EVERY 12 HOURS SCHEDULED
Status: DISCONTINUED | OUTPATIENT
Start: 2023-02-16 | End: 2023-02-18 | Stop reason: HOSPADM

## 2023-02-16 RX ORDER — ONDANSETRON 2 MG/ML
4 INJECTION INTRAMUSCULAR; INTRAVENOUS EVERY 6 HOURS PRN
Status: DISCONTINUED | OUTPATIENT
Start: 2023-02-16 | End: 2023-02-18 | Stop reason: HOSPADM

## 2023-02-16 RX ORDER — INSULIN LISPRO 100 [IU]/ML
0-4 INJECTION, SOLUTION INTRAVENOUS; SUBCUTANEOUS NIGHTLY
Status: DISCONTINUED | OUTPATIENT
Start: 2023-02-16 | End: 2023-02-18 | Stop reason: HOSPADM

## 2023-02-16 RX ORDER — HYDRALAZINE HYDROCHLORIDE 25 MG/1
25 TABLET, FILM COATED ORAL 4 TIMES DAILY PRN
Status: DISCONTINUED | OUTPATIENT
Start: 2023-02-16 | End: 2023-02-18 | Stop reason: HOSPADM

## 2023-02-16 RX ORDER — SODIUM CHLORIDE 0.9 % (FLUSH) 0.9 %
5-40 SYRINGE (ML) INJECTION PRN
Status: DISCONTINUED | OUTPATIENT
Start: 2023-02-16 | End: 2023-02-18 | Stop reason: HOSPADM

## 2023-02-16 RX ORDER — ROPINIROLE 0.25 MG/1
0.75 TABLET, FILM COATED ORAL NIGHTLY
Status: DISCONTINUED | OUTPATIENT
Start: 2023-02-16 | End: 2023-02-18 | Stop reason: HOSPADM

## 2023-02-16 RX ORDER — FLUOXETINE HYDROCHLORIDE 20 MG/1
20 CAPSULE ORAL DAILY
Status: DISCONTINUED | OUTPATIENT
Start: 2023-02-16 | End: 2023-02-18 | Stop reason: HOSPADM

## 2023-02-16 RX ORDER — MAGNESIUM HYDROXIDE/ALUMINUM HYDROXICE/SIMETHICONE 120; 1200; 1200 MG/30ML; MG/30ML; MG/30ML
5 SUSPENSION ORAL EVERY 6 HOURS PRN
Status: DISCONTINUED | OUTPATIENT
Start: 2023-02-16 | End: 2023-02-18 | Stop reason: HOSPADM

## 2023-02-16 RX ORDER — LEVOTHYROXINE SODIUM 0.05 MG/1
50 TABLET ORAL DAILY
Status: DISCONTINUED | OUTPATIENT
Start: 2023-02-16 | End: 2023-02-18 | Stop reason: HOSPADM

## 2023-02-16 RX ADMIN — SODIUM CHLORIDE, PRESERVATIVE FREE 10 ML: 5 INJECTION INTRAVENOUS at 20:29

## 2023-02-16 RX ADMIN — LEVOTHYROXINE SODIUM 50 MCG: 50 TABLET ORAL at 06:22

## 2023-02-16 RX ADMIN — ATORVASTATIN CALCIUM 40 MG: 40 TABLET, FILM COATED ORAL at 02:39

## 2023-02-16 RX ADMIN — ROPINIROLE HYDROCHLORIDE 0.75 MG: 0.25 TABLET, FILM COATED ORAL at 02:39

## 2023-02-16 RX ADMIN — MICONAZOLE NITRATE: 2 POWDER TOPICAL at 21:55

## 2023-02-16 RX ADMIN — SODIUM CHLORIDE, PRESERVATIVE FREE 10 ML: 5 INJECTION INTRAVENOUS at 09:45

## 2023-02-16 RX ADMIN — FLUOXETINE 20 MG: 20 CAPSULE ORAL at 20:26

## 2023-02-16 RX ADMIN — ATORVASTATIN CALCIUM 40 MG: 40 TABLET, FILM COATED ORAL at 20:26

## 2023-02-16 RX ADMIN — ENOXAPARIN SODIUM 30 MG: 100 INJECTION SUBCUTANEOUS at 09:43

## 2023-02-16 RX ADMIN — FLUOXETINE 20 MG: 20 CAPSULE ORAL at 02:39

## 2023-02-16 RX ADMIN — HYDROXYZINE HYDROCHLORIDE 25 MG: 25 TABLET, FILM COATED ORAL at 22:48

## 2023-02-16 RX ADMIN — ROPINIROLE HYDROCHLORIDE 0.75 MG: 0.25 TABLET, FILM COATED ORAL at 20:26

## 2023-02-16 RX ADMIN — ENOXAPARIN SODIUM 30 MG: 100 INJECTION SUBCUTANEOUS at 20:26

## 2023-02-16 NOTE — PROGRESS NOTES
Patient seen and examined by my colleague this morning. Please see her note for further details. Briefly, this is a 49-year-old female with PMH hypertension, DM 2, neuropathy, RLS, hypothyroidism who presents with syncope with a prodrome of positional LH immediately after she stood up from her couch where she was watching TV. Does not know for certain how long she was out for, does believe it was a few minutes. Reports that she woke up and crawled to the door, yelled out for help however nobody responded that she activated EMS. Was examined in the ED a day prior for chest pain, work-up negative and was discharged home. Patient very frustrated at her illness as I speak with her, she thinks that something is wrong with her but does not know what. Endorses significant deconditioning and generalized weakness as of late. Generally walks without any aid however required a walker in the hospital to ambulate today. Reports that she does have a lot of anxiety to the point that she always leaves the front door of her house open in case something happens and she needs to call for help. Is extremely worried about her diabetes. VSS, labs unremarkable except WBC 11.1. Troponin negative. EKG without acute ischemic changes. Glucose on presentation 94 but 84 later when patient was having symptoms. Hemoglobin A1c 6.2, lipid profile normal.  TSH normal.  Orthostats negative x2. CT head and CTA negative. CXR negative 2 days prior. Uncertain the etiology of patient's syncope at this time. We will maintain telemetry and obtain TTE, carotid ultrasound and repeat orthostats. Appreciate cardiology input. Support patient's anxiety with hydroxyzine. We will involve psychiatry if anxiety still uncontrolled tomorrow. Check  Vitamin B12 and folate, vitamin D in the setting of weakness.

## 2023-02-16 NOTE — PROGRESS NOTES
Pt came in from the ED for admission in room 3023. Initial nursing assessment completed and vitals taken and documented. Pt settled in bed and satisfied. No needs at the moment.

## 2023-02-16 NOTE — PLAN OF CARE
Problem: Discharge Planning  Goal: Discharge to home or other facility with appropriate resources  2/16/2023 1647 by Marielos Delatorre RN  Outcome: Progressing  2/16/2023 0314 by Ramon Lucas RN  Outcome: Progressing     Problem: Safety - Adult  Goal: Free from fall injury  2/16/2023 1647 by Marielos Delatorre RN  Outcome: Progressing  2/16/2023 0314 by Ramon Lucas RN  Outcome: Progressing

## 2023-02-16 NOTE — CARE COORDINATION
Case Management Assessment  Initial Evaluation    Date/Time of Evaluation: 2/16/2023 3:14 PM  Assessment Completed by: DARIA Grande    If patient is discharged prior to next notation, then this note serves as note for discharge by case management. Patient Name: Jazz Mills                   YOB: 1970  Diagnosis: Syncope and collapse [R55]                   Date / Time: 2/15/2023  7:33 PM    Patient Admission Status: Inpatient   Readmission Risk (Low < 19, Mod (19-27), High > 27): Readmission Risk Score: 12.1    Current PCP: Heath Camacho MD  PCP verified by CM? Yes    Chart Reviewed: Yes      History Provided by: Patient  Patient Orientation: Alert and Oriented    Patient Cognition: Alert    Hospitalization in the last 30 days (Readmission):  No    If yes, Readmission Assessment in  Navigator will be completed. Advance Directives:      Code Status: Full Code   Patient's Primary Decision Maker is: Patient Declined (Legal Next of Kin Remains as Decision Maker)    Primary Decision Maker: fern rain - Child - [phone]    Discharge Planning:    Patient lives with: Alone Type of Home: Apartment  Primary Care Giver: Self  Patient Support Systems include: Friends/Neighbors   Current Financial resources: Medicaid, Medicare  Current community resources: ECF/Home Care  Current services prior to admission: None            Current DME:              Type of Home Care services:  None    ADLS  Prior functional level: Assistance with the following:, Independent in ADLs/IADLs, Mobility  Current functional level: Assistance with the following:, Independent in ADLs/IADLs, Mobility    PT AM-PAC: 17 /24  OT AM-PAC: 20 /24    Family can provide assistance at DC: No  Would you like Case Management to discuss the discharge plan with any other family members/significant others, and if so, who?  No  Plans to Return to Present Housing: Unknown at present  Other Identified Issues/Barriers to RETURNING to current housing: SNF precert   Potential Assistance needed at discharge: N/A            Potential DME:    [facility] at discharge:      Financial    Payor: Isidro Caballero / Plan: Kalpesh Alexandre / Product Type: *No Product type* /     Does insurance require precert for SNF: Yes    Potential assistance Purchasing Medications:    Meds-to-Beds request: No      [address], [address] [phone] - F [phone]  [facility] [address] [address]  Phone: [phone] Fax: [phone]    [facility], [address]  [address] [address]  Phone: [phone] Fax: [phone]    [address] [place] [address] [phone]  [address] [place] [address]  Phone: [phone] Fax: [phone]      Notes:    Factors facilitating achievement of predicted outcomes: Friend support, motivated    Barriers to discharge: SNF precert     Additional Case Management Notes: CM in to see Pt to initiate discharge planning. Pt agreeable to therapy recommendation of SNF. Choice of [facility]. Referral made to [facility]. Pt denies any needs at this time. CM following     The Plan for Transition of Care is related to the following treatment goals of Syncope and collapse [H32]    IF APPLICABLE: The Patient and/or patient representative Debra Ochoa and her family were provided with a choice of provider and agrees with the discharge plan. Freedom of choice list with basic dialogue that supports the patient's individualized plan of care/goals and shares the quality data associated with the providers was provided to:     Patient     The Patient and/or Patient Representative Agree with the Discharge Plan?   Yes     Hardeep Ryan [facility]  Case Management Department  Ph: [phone]

## 2023-02-16 NOTE — H&P
V2.0  History and Physical      Name:  Pancho Dyer /Age/Sex: 1970  (45 y.o. female)   MRN & CSN:  [id number] & [id number] Encounter Date/Time: 2023 12:15 AM EST   Location:  ED26/ED-26 PCP: Bea Ferris, [facility] Yomi Waddell Day: 2    Assessment and Plan:   Pancho Dyer is a 45 y.o. female with pmh of HTN, T2DM, neuropathy, restless leg syndrome, hyperlipidemia and hypothyroidism who presents with syncope. Hospital Problems             Last Modified POA    * (Principal) Syncope and collapse 2023 Yes       Syncope  Loss of consciousness  Lightheadedness  Patient reports symptoms when standing from sitting position, orthostatic vital signs in the ED were negative twice. CT head and CTA negative. Troponin negative in the ED. EKG with no acute ischemic changes. Blood glucose of 94 initially and then 84 when the patient was symptomatic. Differential diagnosis of orthostatic hypotension although orthostatic vital signs does not support that versus hypoglycemic symptoms even though patient's blood glucose was never below 80, versus TIA or stroke versus other.  -Telemetry  -PT OT  -TSH within normal limits  -TTE ordered and pending  -Hemoglobin A1c and lipid profile ordered and pending  -Continue home metoprolol and statin. If concern for TIAs high would consider starting aspirin.  -Consider brain MRI if felt needed  -Falls precaution    Chest pain -atypical, troponin negative in the ED, telemetry, negative stress test earlier this month. Serial troponin. EKG with no acute ischemic changes on admission.     Hypertension -continue home metoprolol succinate and hydralazine  Type II DM -hold home metformin, low-dose sliding scale, Accu-Cheks and hypoglycemia protocol  Hypothyroidism -TSH within normal limits , continue home levothyroxine  Restless leg syndrome -continue home ropinirole  Hyperlipidemia -continue home statin    Disposition:   Current Living situation: Home  Expected Disposition: Home  Estimated D/C: 22 days    Diet No diet orders on file   DVT Prophylaxis [x] Lovenox, []  Heparin, [] SCDs, [] Ambulation,  [] Eliquis, [] Xarelto, [] Coumadin   Code Status Prior   Surrogate Decision Maker/ POA      History from:     patient, electronic medical record    History of Present Illness:     Chief Complaint: Syncope and collapse  Anderson Pena is a 45 y.o. female with pmh of HTN, T2DM, neuropathy, restless leg syndrome, hyperlipidemia and hypothyroidism who presents with syncope. Patient states she feels lightheaded every time she stands from sitting position, she was using the bathroom when she stood up felt lightheaded and then lost consciousness, does not think she had head trauma. Was seen yesterday for chest pain, negative work-up and was sent home. Patient presents to the ED, vital signs were noted to be unremarkable, lab work unremarkable. Mild white count of 11.1. Glucose on presentation was 94, later on it was 84 when patient was having symptoms. Orthostatic vital signs in the ED were negative. Review of Systems: Need 10 Elements   Review of Systems    10 point review of systems negative except as above. Objective:   No intake or output data in the 24 hours ending 02/16/23 0015   Vitals:   Vitals:    02/15/23 2124 02/15/23 2127 02/15/23 2230 02/15/23 2345   BP: 137/64 129/67 (!) 120/56 112/69   Pulse: 73 74 66 64   Resp:    20   Temp:    98 °F (36.7 °C)   TempSrc:    Oral   SpO2: 96% 96% 97% 96%       Medications Prior to Admission     Prior to Admission medications    Medication Sig Start Date End Date Taking?  Authorizing Provider   omeprazole (PRILOSEC) 20 MG delayed release capsule Take 2 capsules by mouth Daily  Patient not taking: Reported on 2/14/2023 2/7/23 3/9/23  Donovan Rush MD   aluminum & magnesium hydroxide-simethicone (MAALOX) 272-761-34 MG/5ML SUSP suspension Take 5 mLs by mouth every 6 hours as needed for Indigestion 2/7/23 3/9/23  Jonathan Alvarado Juan Jimenez MD   atorvastatin (LIPITOR) 40 MG tablet Take 1 tablet by mouth nightly 2/6/23   Prashant Macdonald MD   metoprolol succinate (TOPROL XL) 25 MG extended release tablet Take 1 tablet by mouth daily  Patient not taking: Reported on 2/14/2023 2/7/23   Prashant Macdonald MD   hydrALAZINE (APRESOLINE) 25 MG tablet Take 25 mg by mouth 4 times daily as needed    Historical Provider, MD   metFORMIN (GLUCOPHAGE) 500 MG tablet Take 500 mg by mouth 2 times daily (with meals)    Historical Provider, MD   FLUoxetine (PROZAC) 20 MG capsule Take 1 capsule by mouth daily 1/10/23   Historical Provider, MD   rOPINIRole (REQUIP) 0.25 MG tablet TAKE 3 TABLETS BY MOUTH 2 HOURS BEFORE BED 3/21/22   Joseline Becker MD   gabapentin (NEURONTIN) 300 MG capsule 300 mg at bedtime.    Patient not taking: No sig reported 1/12/22   Historical Provider, MD   cyclobenzaprine (FLEXERIL) 10 MG tablet  1/12/22   Historical Provider, MD   FLUoxetine (PROZAC) 40 MG capsule at bedtime  12/28/21   Historical Provider, MD   Miryam Level strip test 2 - 3 times a day by Transdermal route as directed 12/28/21   Historical Provider, MD   [address] test UP  [facility] [address] 12/28/21   Historical Provider, MD   Blood Glucose Monitoring Suppl ([address]) w/Device KIT USE AS DIRECTED 10/27/21   Historical Provider, MD   Alcohol Swabs ( STERILE ALCOHOL PREP) PADS take by Topical route every day 12/28/21   Historical Provider, MD Chai Peacelist 2.5 MCG/ACT AERS inhaler  11/22/19   Historical Provider, MD   Cholecalciferol (VITAMIN D3) 25 MCG (1000 UT) TABS  10/11/19   Historical Provider, MD   levothyroxine (SYNTHROID) 50 MCG tablet at bedtime  10/17/19   Historical Provider, MD   meloxicam (MOBIC) 15 MG tablet  10/11/19   Historical Provider, MD       Physical Exam: Need 8 Elements   Physical Exam     General: NAD, obese  Eyes: EOMI  ENT: neck supple  Cardiovascular: Normal rate, regular rhythm  Respiratory: Clear to auscultation  Gastrointestinal: Soft, non tender  Genitourinary: no suprapubic tenderness  Musculoskeletal: No edema  Skin: warm, dry  Neuro: Alert. Oriented x4, nonfocal exam, normal speech  Psych: Mood appropriate. Past Medical History:   PMHx   Past Medical History:   Diagnosis Date    Anxiety     COPD (chronic obstructive pulmonary disease) (HCC)     Hypoglycemia     history    Panic attacks     Primary osteoarthritis of right knee     Separation anxiety     TIA (transient ischemic attack)      PSHX:  has a past surgical history that includes Hysterectomy;  section; Tubal ligation; Cholecystectomy; Knee cartilage surgery; Total knee arthroplasty (Right, 2022); and Ovary removal.  Allergies: No Known Allergies  Fam HX: family history includes Cancer in her father and mother.   Soc HX:   Social History     Socioeconomic History    Marital status:      Spouse name: None    Number of children: None    Years of education: None    Highest education level: None   Tobacco Use    Smoking status: Former     Packs/day: 1.00     Years: 30.00     Pack years: 30.00     Types: Cigarettes    Smokeless tobacco: Never   Vaping Use    Vaping Use: Never used   Substance and Sexual Activity    Alcohol use: No     Comment: occasional    Drug use: Never    Sexual activity: Not Currently       Medications:   Medications:    Infusions:   PRN Meds:     Labs      CBC:   Recent Labs     23  1551 02/15/23  2022   WBC 11.0* 11.1*   HGB 13.5 14.2    179     BMP:    Recent Labs     23  1551 23  2159 02/15/23  2022     --  140   K 3.9  --  3.7     --  104   CO2 23  --  23   BUN 16  --  25*   CREATININE 0.6  --  0.7   GLUCOSE 101* 91 93     Hepatic:   Recent Labs     23  1551 02/15/23  2022   AST 29 22   ALT 63* 54*   BILITOT 0.6 0.6   ALKPHOS 73 70     Lipids: No results found for: CHOL, HDL, TRIG  Hemoglobin A1C:   Lab Results   Component Value Date/Time    LABA1C 6.8 02/04/2023 04:02 PM     TSH: No results found for: TSH  Troponin:   Lab Results   Component Value Date/Time    TROPONINT <0.010 02/15/2023 08:22 PM    TROPONINT <0.010 02/14/2023 07:57 PM    TROPONINT <0.010 02/14/2023 03:51 PM     Lactic Acid: No results for input(s): LACTA in the last 72 hours. BNP: No results for input(s): PROBNP in the last 72 hours. UA:  Lab Results   Component Value Date/Time    NITRU NEGATIVE 01/29/2023 06:43 PM    NITRU NEGATIVE 06/08/2013 06:40 PM    COLORU YELLOW 01/29/2023 06:43 PM    WBCUA 3 01/29/2023 06:43 PM    RBCUA NONE SEEN 01/29/2023 06:43 PM    MUCUS RARE 02/16/2022 10:15 AM    TRICHOMONAS NONE SEEN 01/29/2023 06:43 PM    BACTERIA NEGATIVE 01/29/2023 06:43 PM    CLARITYU CLEAR 01/29/2023 06:43 PM    SPECGRAV 1.010 01/29/2023 06:43 PM    LEUKOCYTESUR NEGATIVE 01/29/2023 06:43 PM    UROBILINOGEN 0.2 01/29/2023 06:43 PM    BILIRUBINUR NEGATIVE 01/29/2023 06:43 PM    BLOODU TRACE 01/29/2023 06:43 PM    GLUCOSEU neg 06/08/2013 06:40 PM    GLUCOSEU NEGATIVE 06/08/2013 06:40 PM    KETUA NEGATIVE 01/29/2023 06:43 PM     Urine Cultures: No results found for: Rudolm Mealing  Blood Cultures: No results found for: BC  No results found for: BLOODCULT2  Organism:   Lab Results   Component Value Date/Time    ORG MRSA 03/04/2013 12:45 AM       Imaging/Diagnostics Last 24 Hours   CT HEAD WO CONTRAST    Result Date: 2/15/2023  EXAMINATION: CTA OF THE HEAD AND NECK WITH CONTRAST; CT OF THE HEAD WITHOUT CONTRAST 2/15/2023 11:03 pm: TECHNIQUE: CTA of the head and neck was performed with the administration of intravenous contrast. Multiplanar reformatted images are provided for review. MIP images are provided for review. Stenosis of the internal carotid arteries measured using NASCET criteria.  Automated exposure control, iterative reconstruction, and/or weight based adjustment of the mA/kV was utilized to reduce the radiation dose to as low as reasonably achievable.; CT of the head was performed without the administration of intravenous contrast. Automated exposure control, iterative reconstruction, and/or weight based adjustment of the mA/kV was utilized to reduce the radiation dose to as low as reasonably achievable. Noncontrast CT of the head with reconstructed 2-D images are also provided for review. COMPARISON: CT head 06/08/2013. Brain MRI 01/19/2011 HISTORY: ORDERING SYSTEM PROVIDED HISTORY: Lightheaded, head feels funny TECHNOLOGIST PROVIDED HISTORY: Reason for exam:->Lightheaded, head feels funny Has a \"code stroke\" or \"stroke alert\" been called? ->No Decision Support Exception - unselect if not a suspected or confirmed emergency medical condition->Emergency Medical Condition (MA) Reason for Exam: Lightheaded, head feels funny; ORDERING SYSTEM PROVIDED HISTORY: Head feels funny, lightheaded TECHNOLOGIST PROVIDED HISTORY: Has a \"code stroke\" or \"stroke alert\" been called? ->No Reason for exam:->Head feels funny, lightheaded Decision Support Exception - unselect if not a suspected or confirmed emergency medical condition->Emergency Medical Condition (MA) Is the patient pregnant?->No Reason for Exam: Lightheaded, head feels funny FINDINGS: CT HEAD: BRAIN/VENTRICLES:  No acute intracranial hemorrhage or extraaxial fluid collection. Grey-white differentiation is maintained. No evidence of mass, mass effect or midline shift. No evidence of hydrocephalus. Scattered hypoattenuating supratentorial white matter lesions, nonspecific but consistent with mild chronic ischemic white matter changes. Left lateral ventricle is mildly larger than the right, similar to prior. ORBITS: The visualized portion of the orbits demonstrate no acute abnormality. SINUSES:  The visualized paranasal sinuses and mastoid air cells demonstrate no acute abnormality. SOFT TISSUES/SKULL: No acute abnormality of the visualized skull or soft tissues. CTA NECK: AORTIC ARCH/ARCH VESSELS: No dissection or arterial injury.   No significant stenosis of the brachiocephalic or subclavian arteries. CAROTID ARTERIES: No dissection, arterial injury, or hemodynamically significant stenosis by NASCET criteria. Retropharyngeal course of both cervical ICAs. VERTEBRAL ARTERIES: No dissection, arterial injury, or significant stenosis. SOFT TISSUES: The lung apices are clear. No cervical or superior mediastinal lymphadenopathy. The larynx and pharynx are unremarkable. No acute abnormality of the salivary and thyroid glands. BONES: No acute osseous abnormality. Disc osteophyte complex results in moderate spinal canal narrowing at C5-C6. CTA HEAD: ANTERIOR CIRCULATION: No significant stenosis of the intracranial internal carotid, anterior cerebral, or middle cerebral arteries. No aneurysm. POSTERIOR CIRCULATION: No significant stenosis of the vertebral, basilar, or posterior cerebral arteries. No aneurysm. OTHER: No dural venous sinus thrombosis on this non-dedicated study. 1. No acute intracranial abnormality. 2. No significant stenosis, aneurysm, or arterial injury CTA of the head and neck. XR CHEST PORTABLE    Result Date: 2/14/2023  EXAMINATION: ONE XRAY VIEW OF THE CHEST 2/14/2023 3:49 pm COMPARISON: 02/07/2023 HISTORY: ORDERING SYSTEM PROVIDED HISTORY: chest pain TECHNOLOGIST PROVIDED HISTORY: Reason for exam:->chest pain Reason for Exam: chest pain FINDINGS: No lung infiltrate or consolidation. No pneumothorax or pleural effusion. Heart size is normal.     No acute abnormality. CTA HEAD NECK W CONTRAST    Result Date: 2/15/2023  EXAMINATION: CTA OF THE HEAD AND NECK WITH CONTRAST; CT OF THE HEAD WITHOUT CONTRAST 2/15/2023 11:03 pm: TECHNIQUE: CTA of the head and neck was performed with the administration of intravenous contrast. Multiplanar reformatted images are provided for review. MIP images are provided for review. Stenosis of the internal carotid arteries measured using NASCET criteria.  Automated exposure control, iterative reconstruction, and/or weight based adjustment of the mA/kV was utilized to reduce the radiation dose to as low as reasonably achievable.; CT of the head was performed without the administration of intravenous contrast. Automated exposure control, iterative reconstruction, and/or weight based adjustment of the mA/kV was utilized to reduce the radiation dose to as low as reasonably achievable. Noncontrast CT of the head with reconstructed 2-D images are also provided for review. COMPARISON: CT head 06/08/2013. Brain MRI 01/19/2011 HISTORY: ORDERING SYSTEM PROVIDED HISTORY: Lightheaded, head feels funny TECHNOLOGIST PROVIDED HISTORY: Reason for exam:->Lightheaded, head feels funny Has a \"code stroke\" or \"stroke alert\" been called? ->No Decision Support Exception - unselect if not a suspected or confirmed emergency medical condition->Emergency Medical Condition (MA) Reason for Exam: Lightheaded, head feels funny; ORDERING SYSTEM PROVIDED HISTORY: Head feels funny, lightheaded TECHNOLOGIST PROVIDED HISTORY: Has a \"code stroke\" or \"stroke alert\" been called? ->No Reason for exam:->Head feels funny, lightheaded Decision Support Exception - unselect if not a suspected or confirmed emergency medical condition->Emergency Medical Condition (MA) Is the patient pregnant?->No Reason for Exam: Lightheaded, head feels funny FINDINGS: CT HEAD: BRAIN/VENTRICLES:  No acute intracranial hemorrhage or extraaxial fluid collection. Grey-white differentiation is maintained. No evidence of mass, mass effect or midline shift. No evidence of hydrocephalus. Scattered hypoattenuating supratentorial white matter lesions, nonspecific but consistent with mild chronic ischemic white matter changes. Left lateral ventricle is mildly larger than the right, similar to prior. ORBITS: The visualized portion of the orbits demonstrate no acute abnormality.  SINUSES:  The visualized paranasal sinuses and mastoid air cells demonstrate no acute abnormality. SOFT TISSUES/SKULL: No acute abnormality of the visualized skull or soft tissues. CTA NECK: AORTIC ARCH/ARCH VESSELS: No dissection or arterial injury. No significant stenosis of the brachiocephalic or subclavian arteries. CAROTID ARTERIES: No dissection, arterial injury, or hemodynamically significant stenosis by NASCET criteria. Retropharyngeal course of both cervical ICAs. VERTEBRAL ARTERIES: No dissection, arterial injury, or significant stenosis. SOFT TISSUES: The lung apices are clear. No cervical or superior mediastinal lymphadenopathy. The larynx and pharynx are unremarkable. No acute abnormality of the salivary and thyroid glands. BONES: No acute osseous abnormality. Disc osteophyte complex results in moderate spinal canal narrowing at C5-C6. CTA HEAD: ANTERIOR CIRCULATION: No significant stenosis of the intracranial internal carotid, anterior cerebral, or middle cerebral arteries. No aneurysm. POSTERIOR CIRCULATION: No significant stenosis of the vertebral, basilar, or posterior cerebral arteries. No aneurysm. OTHER: No dural venous sinus thrombosis on this non-dedicated study. 1. No acute intracranial abnormality. 2. No significant stenosis, aneurysm, or arterial injury CTA of the head and neck. Personally reviewed Lab Studies, Imaging, and EKG.      Electronically signed by Luz Maria Young MD on 2/16/2023 at 12:15 AM

## 2023-02-16 NOTE — CARE COORDINATION
Pt noted for possible readmission. Pt was recently admitted 2/4-2/6/23 for chest pain. Pt has also had two ed visits with this being the third since admission. 2/4 for chest pain and 2/14 for chest pain. Pt was d/c with [address] [place] [address] Rd on 2/7/23 for skilled RN, diabetic education and PT/OT. PT has insurance, PCP and lives with son and daughter in law. Pt has a wheeled walker. Pt returns today after a syncopal episode. Pt's results still pending at this time. [address] [place] [address] Rd was set up for pt 2/7/23.

## 2023-02-16 NOTE — ED NOTES
Pt called out stating \"she feels funny. Pt placed on telemonitor. All vitals stable. Pt alert and oriented. Dr. Vivas made aware.       Willard Olszewski, RN  02/15/23 9749

## 2023-02-16 NOTE — ED TRIAGE NOTES
Pt presented to ED via EMS with complaint of dizziness and syncopal episode. Pt states she took her metformin and was sitting on the couch feeling lightheaded. When she stood up she passed out, doesn't know if she hit her head. Has some L shoulder pain. Per EMS blood sugar was 114.

## 2023-02-16 NOTE — PROGRESS NOTES
Occupational Therapy    Formerly [facility] ACUTE CARE OCCUPATIONAL THERAPY EVALUATION  Julissa Valdez, 1970, [id number], 2023    History  [place]:  The encounter diagnosis was Syncope and collapse. Patient  has a past medical history of Anxiety, COPD (chronic obstructive pulmonary disease) ([address] Hypoglycemia, Panic attacks, Primary osteoarthritis of right knee, Separation anxiety, and TIA (transient ischemic attack). Patient  has a past surgical history that includes Hysterectomy;  section; Tubal ligation; Cholecystectomy; Knee cartilage surgery; Total knee arthroplasty (Right, 2022); and Ovary removal.    Subjective:  Patient states:  \"I think I need some rehab. \".    Pain:  No.    Communication with other providers:  Handoff to RN  Restrictions: General Precautions, Fall Risk    Home Setup/Prior level of function  Social/Functional History  Lives With: Alone  Type of Home: Apartment  Home Layout: One level  Home Access: Stairs to enter with rails  Entrance Stairs - Number of Steps: 4  Entrance Stairs - Rails: Left  Bathroom Shower/Tub: Walk-in shower  Bathroom Toilet: Standard  Bathroom Equipment: Shower chair  Has the patient had two or more falls in the past year or any fall with injury in the past year?: Yes (1 recent fall due to dizziness)  ADL Assistance: [address] [place] [address] Avenue: Independent  Homemaking Responsibilities: Yes  Ambulation Assistance: Independent  Transfer Assistance: Independent  Active : No  Mode of Transportation: Car    Examination of body systems (includes body structures/functions, activity/participation limitations):  Observation:  Supine in bed upon arrival, agreeable to therapy   Vision:  Readers  Hearing:  [employer] [place]  Cardiopulmonary:  No 02 needs. BP normal throughout session, dizziness increasing w/ positional changes ~8 during toilet tranfser, decreased ~3 sitting upright in chair      Body Systems and functions:  ROM R/L:  WFL. Strength R/L:  4+/5,   Sensation: WFL  Tone: Normal  Coordination: WFL  Perception: WNL    Activities of Daily Living (ADLs):  Feeding: Stone  Grooming: CGA (washing hands in stand at sink)  UB bathing: Supervision  LB bathing: CGA (washing shanta area/buttocks in stand after toileting)  UB dressing: Supervision  LB dressing: CGA (threading underwear to prepare for toileting)  Toileting: CGA (toileted on standard commode this session, see LB bathing/dressing for details)    Cognitive and Psychosocial Functioning:  Overall cognitive status: WNL  Affect: Normal        Mobility:  Supine to sit:  SBA  Transfers: CGA from EOB up to RW  Sitting balance:  SBA. Standing balance:  CGA w/ RW. Functional Mobility CGA w/ RW to/from bathroom w/ increased dizziness ~8 during functional mobility, decreased ~3 sitting upright in chair  Toilet/Shower Transfers: DNT             AM-[facility] Daily Activity - Inpatient   How much help is needed for putting on and taking off regular lower body clothing?: A Little  How much help is needed for bathing (which includes washing, rinsing, drying)?: A Little  How much help is needed for toileting (which includes using toilet, bedpan, or urinal)?: A Little  How much help is needed for putting on and taking off regular upper body clothing?: None  How much help is needed for taking care of personal grooming?: A Little  How much help for eating meals?: None  [facility] Inpatient Daily Activity Raw Score: 20  AM-[facility] Inpatient ADL T-Scale Score : 42.03  ADL Inpatient CMS 0-100% Score: 38.32  ADL Inpatient CMS G-Code Modifier : CJ    Treatment:  Self Care Training:   Cues were given for safety, sequence, UE/LE placement, visual cues, and balance. Activities performed today included grooming, LB bathing/dressing, toileting, toilet transfer     Safety: patient left in chair with chair alarm, call light within reach, RN notified, gait belt used.     Assessment:  Pt is a 46 yo female admitted from home for syncope and collapse. Pt at baseline is Independent for ADLs Independent for high level IADLs and Independent for functional transfers/mobility. Pt currently presents w/ deficits in ADL and high level IADL independence, functional activity tolerance, dynamic sitting and standing balance and tolerance and functional transfers and BUE strength. Pt would benefit from continued acute care OT services w/ discharge to SNF  Complexity: Moderate  Prognosis: Good, no significant barriers to participation at this time. Occupational Therapy Plan  Times Per Week: 3x+  Times Per Day:  Once a day  Current Treatment Recommendations: Strengthening, ROM, Balance training, Functional mobility training, Endurance training, Pain management, Equipment evaluation, education, & procurement, Safety education & training, Patient/Caregiver education & training, Positioning, Home management training, Self-Care / ADL     Equipment: defer    Goals:  Pt goal: go home  Time Frame for STGs: discharge  Goal 1: Pt will perform UE ADLs Independent  Goal 2: Pt will perform LE ADLs Independent  Goal 3: Pt will perform toileting Independent  Goal 4: Pt will perform functional transfer w/ AD [place]  Goal 5: Pt will perform functional mobility w/ AD [place]  Goal 6: Pt will perform therex/theract in order to increase functional activity tolerance and dynamic standing balance    Treatment plan:  Pt will perform functional task in stand reaching in all 3 planes in order to increase dynamic standing balance and functional activity tolerance    Recommendations for NURSING activity: Up to chair for all 3 meals and up to standard commode for all toileting needs    Time:   Time in: 0926  Time out: 0950  Timed treatment minutes: 9 minutes  Total time: 24 minutes    Electronically signed by:    Gary HOPKINS/L [id number]  11:01 AM,2/16/2023

## 2023-02-16 NOTE — ED NOTES
Pt given 6oz of orange juice per Dr. Ch S, [facility].       Galina Senior RN  02/15/23 2304

## 2023-02-16 NOTE — PROGRESS NOTES
4 Eyes Skin Assessment     NAME:  Jazz Mills  YOB: 1970  MEDICAL RECORD NUMBER:  [id number]    The patient is being assessed for  Admission    I agree that One RN has performed a thorough Head to Toe Skin Assessment on the patient. ALL assessment sites listed below have been assessed. Areas assessed by both nurses:    Head, Face, Ears, Shoulders, Back, Chest, Arms, Elbows, Hands, Sacrum. Buttock, Coccyx, Ischium, and Legs. Feet and Heels        Does the Patient have a Wound?  No noted wound(s)       Feliberto Prevention initiated by RN: NA   Wound Care Orders initiated by RN: NA    Pressure Injury (Stage 3,4, Unstageable, DTI, NWPT, and Complex wounds) if present, place referral order by RN under : NA    New and Established Ostomies, if present place, referral order under : NA      Nurse 1 eSignature: Electronically signed by Yosvany Singh RN on 2/16/23 at 2:59 AM EST    **SHARE this note so that the co-signing nurse can place an eSignature**    Nurse 2 eSignature: Electronically signed by Nuzhat Calabrese RN on 2/16/23 at 3:09 AM EST

## 2023-02-16 NOTE — CONSULTS
Chart reviewed full note to follow                      Name:  Rachel Freeman /Age/Sex: 1970  (45 y.o. female)   MRN & CSN:  [id number] & [id number] Admission Date/Time: 2/15/2023  7:33 PM   Location:  [facility]3023- PCP: Quang Pope Day: 2          Referring physician:  Shamika Manning MD         Reason for consultation: Syncope        Thanks for referral.    Information source: Patient    CC; syncope      HPI:   Thank you for involving me in taking  care of Rachel Freeman who  is a 45 y. o.year  Old female  Presents with hypertension, diabetes, hyperlipidemia, hypothyroidism recent Cardiolite and echo unremarkable                    Past medical history:    has a past medical history of Anxiety, COPD (chronic obstructive pulmonary disease) ([address] Hypoglycemia, Panic attacks, Primary osteoarthritis of right knee, Separation anxiety, and TIA (transient ischemic attack). Past surgical history:   has a past surgical history that includes Hysterectomy;  section; Tubal ligation; Cholecystectomy; Knee cartilage surgery; Total knee arthroplasty (Right, 2022); and Ovary removal.  Social History:   reports that she has quit smoking. Her smoking use included cigarettes. She has a 30.00 pack-year smoking history. She has never used smokeless tobacco. She reports that she does not drink alcohol and does not use drugs. Family history:  family history includes Cancer in her father and mother.     No Known Allergies    aluminum & magnesium hydroxide-simethicone (MAALOX) 200-200-20 MG/5ML suspension 5 mL, Q6H PRN  atorvastatin (LIPITOR) tablet 40 mg, Nightly  FLUoxetine (PROZAC) capsule 20 mg, Daily  hydrALAZINE (APRESOLINE) tablet 25 mg, 4x Daily PRN  levothyroxine (SYNTHROID) tablet 50 mcg, Daily  metoprolol succinate (TOPROL XL) extended release tablet 25 mg, Daily  rOPINIRole (REQUIP) tablet 0.75 mg, Nightly  sodium chloride flush 0.9 % injection 5-40 mL, 2 times per day  sodium chloride flush 0.9 % injection 5-40 mL, PRN  0.9 % sodium chloride infusion, PRN  enoxaparin Sodium (LOVENOX) injection 30 mg, BID  acetaminophen (TYLENOL) tablet 650 mg, Q4H PRN  ondansetron (ZOFRAN-ODT) disintegrating tablet 4 mg, Q8H PRN   Or  ondansetron (ZOFRAN) injection 4 mg, Q6H PRN  insulin lispro (HUMALOG) injection vial 0-4 Units, TID WC  insulin lispro (HUMALOG) injection vial 0-4 Units, Nightly  glucose chewable tablet 16 g, PRN  dextrose bolus 10% 125 mL, PRN   Or  dextrose bolus 10% 250 mL, PRN  glucagon (rDNA) injection 1 mg, PRN  dextrose 10 % infusion, Continuous PRN  nicotine (NICODERM CQ) 7 MG/24HR 1 patch, Daily    Current Facility-Administered Medications   Medication Dose Route Frequency Provider Last Rate Last Admin    aluminum & magnesium hydroxide-simethicone (MAALOX) 200-200-20 MG/5ML suspension 5 mL  5 mL Oral Q6H PRN Bry Mccullough MD        atorvastatin (LIPITOR) tablet 40 mg  40 mg Oral Nightly Bry Mccullough MD   40 mg at 02/16/23 0239    FLUoxetine (PROZAC) capsule 20 mg  20 mg Oral Daily Bry Mccullough MD   20 mg at 02/16/23 1151    hydrALAZINE (APRESOLINE) tablet 25 mg  25 mg Oral 4x Daily PRN Bry Mccullough MD        levothyroxine (SYNTHROID) tablet 50 mcg  50 mcg Oral Daily Bry Mccullough MD   50 mcg at 02/16/23 9311    metoprolol succinate (TOPROL XL) extended release tablet 25 mg  25 mg Oral Daily Bry Mccullough MD        rOPINIRole (REQUIP) tablet 0.75 mg  0.75 mg Oral Nightly Bry Mccullough MD   0.75 mg at 02/16/23 0239    sodium chloride flush 0.9 % injection 5-40 mL  5-40 mL IntraVENous 2 times per day Bry Mccullough MD   10 mL at 02/16/23 0945    sodium chloride flush 0.9 % injection 5-40 mL  5-40 mL IntraVENous PRN Bry Mccullough MD        0.9 % sodium chloride infusion   IntraVENous PRN Bry Mccullough MD        enoxaparin Sodium (LOVENOX) injection 30 mg  30 mg SubCUTAneous BID Bry Mccullough MD   30 mg at 02/16/23 0943    acetaminophen (TYLENOL) tablet 650 mg  650 mg Oral Q4H PRN Romeo Craft MD        ondansetron (ZOFRAN-ODT) disintegrating tablet 4 mg  4 mg Oral Q8H PRN Romeo Craft MD        Or    ondansetron (ZOFRAN) injection 4 mg  4 mg IntraVENous Q6H PRN Romeo Craft MD        insulin lispro (HUMALOG) injection vial 0-4 Units  0-4 Units SubCUTAneous TID WC Romeo Craft MD        insulin lispro (HUMALOG) injection vial 0-4 Units  0-4 Units SubCUTAneous Nightly Romeo Craft MD        glucose chewable tablet 16 g  4 tablet Oral PRN Romeo Craft MD        dextrose bolus 10% 125 mL  125 mL IntraVENous PRN Romeo Craft MD        Or    dextrose bolus 10% 250 mL  250 mL IntraVENous PRN Romeo Craft MD        glucagon (rDNA) injection 1 mg  1 mg SubCUTAneous PRN Romeo Craft MD        dextrose 10 % infusion   IntraVENous Continuous PRN Romeo Craft MD        nicotine (NICODERM CQ) 7 MG/24HR 1 patch  1 patch TransDERmal Daily Delmar Salter MD         Review of Systems:  All 14 systems reviewed, all negative except for syncope    Physical Examination:    BP (!) 142/87   Pulse 78   Temp 98 °F (36.7 °C) (Oral)   Resp 19   Ht 5' 3\" (1.6 m)   Wt 242 lb (109.8 kg)   SpO2 95%   BMI 42.87 kg/m²      Wt Readings from Last 3 Encounters:   02/16/23 242 lb (109.8 kg)   02/14/23 245 lb (111.1 kg)   02/14/23 242 lb (109.8 kg)     Body mass index is 42.87 kg/m². General Appearance: Fair  Head: normocephalic     Eyes: normal, noninjected conjunctiva    ENT: normal mucosa, noninjected throat, normal     NECK: No JVP  No thyromegaly        Cardiovascular: No thrills palpated   Auscultation: Normal S1 and S2, no murmur   carotid bruit no   Abdominal Aorta no bruit    Respiratory:    Breath sounds clear    Extremities: No edema clubbing ,   no cyanosis    SKIN: Warm and well perfused, no pallor or cyanosis    Vascular exam:  Pedal Pulses: Palp bilaterally        Abdomen:  No masses or tenderness. No organomegaly noted.     Neurological:  Oriented to time, place, and person   No focal neurological deficit noted.   Psychiatric:normal mood, no anxiety    Lab Review   Recent Results (from the past 24 hour(s))   CBC with Auto Differential    Collection Time: 02/15/23  8:22 PM   Result Value Ref Range    WBC 11.1 (H) 4.0 - 10.5 K/CU MM    RBC 4.91 4.2 - 5.4 M/CU MM    Hemoglobin 14.2 12.5 - 16.0 GM/DL    Hematocrit 42.6 37 - 47 %    MCV 86.8 78 - 100 FL    MCH 28.9 27 - 31 PG    MCHC 33.3 32.0 - 36.0 %    RDW 13.0 11.7 - 14.9 %    Platelets 563 432 - 895 K/CU MM    MPV 12.4 (H) 7.5 - 11.1 FL    Differential Type AUTOMATED DIFFERENTIAL     Segs Relative 58.4 36 - 66 %    Lymphocytes % 31.9 24 - 44 %    Monocytes % 6.7 (H) 0 - 4 %    Eosinophils % 2.1 0 - 3 %    Basophils % 0.5 0 - 1 %    Segs Absolute 6.5 K/CU MM    Lymphocytes Absolute 3.5 K/CU MM    Monocytes Absolute 0.7 K/CU MM    Eosinophils Absolute 0.2 K/CU MM    Basophils Absolute 0.1 K/CU MM    Nucleated RBC % 0.0 %    Total Nucleated RBC 0.0 K/CU MM    Total Immature Neutrophil 0.04 K/CU MM    Immature Neutrophil % 0.4 0 - 0.43 %   Troponin    Collection Time: 02/15/23  8:22 PM   Result Value Ref Range    Troponin T <0.010 <0.01 NG/ML   Comprehensive Metabolic Panel    Collection Time: 02/15/23  8:22 PM   Result Value Ref Range    Sodium 140 135 - 145 MMOL/L    Potassium 3.7 3.5 - 5.1 MMOL/L    Chloride 104 99 - 110 mMol/L    CO2 23 21 - 32 MMOL/L    BUN 25 (H) 6 - 23 MG/DL    Creatinine 0.7 0.6 - 1.1 MG/DL    Est, Glom Filt Rate >60 >60 mL/min/1.73m2    Glucose 93 70 - 99 MG/DL    Calcium 9.5 8.3 - 10.6 MG/DL    Albumin 4.3 3.4 - 5.0 GM/DL    Total Protein 6.8 6.4 - 8.2 GM/DL    Total Bilirubin 0.6 0.0 - 1.0 MG/DL    ALT 54 (H) 10 - 40 U/L    AST 22 15 - 37 IU/L    Alkaline Phosphatase 70 40 - 129 IU/L    Anion Gap 13 4 - 16   Hemoglobin A1C    Collection Time: 02/15/23  8:22 PM   Result Value Ref Range    Hemoglobin A1C 6.2 4.2 - 6.3 %    eAG 131 mg/dL   EKG 12 Lead    Collection Time: 02/15/23  8:24 PM   Result Value Ref Range    Ventricular Rate 69 BPM    Atrial Rate 69 BPM    P-R Interval 178 ms    QRS Duration 62 ms    Q-T Interval 370 ms    QTc Calculation (Bazett) 396 ms    P Axis 62 degrees    R Axis 3 degrees    T Axis 11 degrees    Diagnosis       Normal sinus rhythm  Septal infarct , age undetermined  Abnormal ECG  When compared with ECG of 14-FEB-2023 15:43,  Septal infarct is now present  ST now depressed in Lateral leads  Nonspecific T wave abnormality now evident in Anterior leads     POCT Glucose    Collection Time: 02/15/23  8:52 PM   Result Value Ref Range    POC Glucose 94 70 - 99 MG/DL   POCT Glucose    Collection Time: 02/15/23 10:22 PM   Result Value Ref Range    POC Glucose 84 70 - 99 MG/DL   Lipid Panel    Collection Time: 02/16/23  1:00 AM   Result Value Ref Range    Triglycerides 112 <150 MG/DL    Cholesterol 94 <200 MG/DL    HDL 41 >40 MG/DL    LDL Calculated 31 <100 MG/DL   POCT Glucose    Collection Time: 02/16/23  2:35 AM   Result Value Ref Range    POC Glucose 92 70 - 99 MG/DL   POCT Glucose    Collection Time: 02/16/23  8:16 AM   Result Value Ref Range    POC Glucose 108 (H) 70 - 99 MG/DL   POCT Glucose    Collection Time: 02/16/23 11:03 AM   Result Value Ref Range    POC Glucose 104 (H) 70 - 99 MG/DL           Assessment/Recommendations:     -Syncope etiology unclear patient appears to be orthostatic given that she passed out after standing from reclining chair we will obtain orthostatics and a carotid ultrasound  -Hyperlipidemia on Lipitor 40 mg p.o. daily  -Hypertension Toprol-XL 25         Stacy Holbrook MD, 2/16/2023 3:28 PM       Please note this report has been partially produced using speech recognition software and may contain errors related to that system including errors in grammar, punctuation, and spelling, as well as words and phrases that may be inappropriate. If there are any questions or concerns please feel free to contact the dictating provider for clarification.

## 2023-02-16 NOTE — ED PROVIDER NOTES
EMERGENCY DEPARTMENT ENCOUNTER      CHIEF COMPLAINT:   Syncope    HPI: Everardo Paris is a 45 y.o. female who presents to the ED, via EMS, for evaluation after having a syncopal episode. The patient states that she was sitting down to eat her dinner and then took her metformin as usual.  She began to feel lightheaded. When she stood up she became significantly more lightheaded and passed out, collapsing to the floor. She is unsure if she hit her head. She does not know how long she was unconscious for. She states that she woke up on the floor and tried to crawl to the door to call for help. Nobody could hear her to come and help her. She was able to crawl back to the chair and get up into the chair and called 911. She denied any preceding chest pain or palpitations. However, she was having chest pain yesterday for which she was seen in this emergency department and had a reassuring work-up. She denies any associated shortness of breath. She denies a headache but states that she just does not feel right. She feels as though her brain is \"short circuiting\". Symptoms were constant but have since resolved. Symptoms are worse when standing up and better with rest.  She denies fevers, chills, cough, shortness of breath, nausea, vomiting, numbness, tingling, weakness or any other complaints. REVIEW OF SYSTEMS:   Constitutional: See HPI  Eyes:  Denies change in visual acuity  HENT:  Denies nasal congestion or sore throat  Respiratory:  Denies cough or shortness of breath  Cardiovascular:  Denies chest pain or edema  GI:  Denies abdominal pain, nausea, vomiting, bloody stools or diarrhea  :  Denies dysuria  Musculoskeletal:  Denies back pain or joint pain  Integument:  Denies rash  Neurologic: See HPI  \"Remaining review of systems reviewed and negative. I have reviewed the nursing triage documentation and agree unless otherwise noted below. \"      PAST MEDICAL HISTORY:   Past Medical History: Diagnosis Date    Anxiety     COPD (chronic obstructive pulmonary disease) (HCC)     Hypoglycemia     history    Panic attacks     Primary osteoarthritis of right knee     Separation anxiety     TIA (transient ischemic attack)        CURRENT MEDICATIONS:   Home medications reviewed. SURGICAL HISTORY:   Past Surgical History:   Procedure Laterality Date     SECTION      CHOLECYSTECTOMY      HYSTERECTOMY (CERVIX STATUS UNKNOWN)      KNEE CARTILAGE SURGERY      R knee     OVARY REMOVAL      TOTAL KNEE ARTHROPLASTY Right 2022    RIGHT KNEE TOTAL ARTHROPLASTY ROBOTIC performed by Annalee Cheng MD at [facility]:   Family History   Problem Relation Age of Onset    Cancer Mother     Cancer Father     Breast Cancer Neg Hx     Ovarian Cancer Neg Hx        SOCIAL HISTORY:   Social History     Socioeconomic History    Marital status:      Spouse name: Not on file    Number of children: Not on file    Years of education: Not on file    Highest education level: Not on file   Occupational History    Not on file   Tobacco Use    Smoking status: Former     Packs/day: 1.00     Years: 30.00     Pack years: 30.00     Types: Cigarettes    Smokeless tobacco: Never   Vaping Use    Vaping Use: Never used   Substance and Sexual Activity    Alcohol use: No     Comment: occasional    Drug use: Never    Sexual activity: Not Currently   Other Topics Concern    Not on file   Social History Narrative    Not on file     Social Determinants of Health     Financial Resource Strain: Not on file   Food Insecurity: Not on file   Transportation Needs: Not on file   Physical Activity: Not on file   Stress: Not on file   Social Connections: Not on file   Intimate Partner Violence: Not on file   Housing Stability: Not on file       ALLERGIES: Patient has no known allergies.     PHYSICAL EXAM:  VITAL SIGNS:   ED Triage Vitals [02/15/23 1938]   Enc Vitals Group      BP (!) 124/44      Heart Rate 64      Resp 16      Temp 98.1 °F (36.7 °C)      Temp Source Oral      SpO2 95 %      Weight       Height       Head Circumference       Peak Flow       Pain Score       Pain Loc       Pain Edu? Excl. in [address]? Constitutional:  Non-toxic appearance  HENT: Normocephalic, Atraumatic, Bilateral external ears normal, Oropharynx moist, No oral exudates, Nose normal.  Eyes:  PERRL, EOMI, Conjunctiva normal, No discharge. Neck: Normal range of motion, No tenderness, Supple, No stridor, No lymphadenopathy  Cardiovascular:  Normal heart rate, Normal rhythm  Pulmonary/Chest:  Normal breath sounds, No respiratory distress, No wheezing  Abdomen: Bowel sounds normal, Soft, No tenderness, No masses, No pulsatile masses  Extremities:  Normal range of motion, Intact distal pulses, No edema, No tenderness  Neurologic: Alert & oriented x 3, Speech clear, CN 2-12 intact, Normal motor function, Sensation intact to light touch throughout, No focal deficits  Skin:  Warm, Dry, No erythema, No rash      EKG Interpretation  Interpreted by me  Compared to 12/14/2020  Rhythm: normal sinus  Rate: normal 69  Axis: normal  Ectopy: none  Conduction: normal  ST Segments: no acute change  T Waves: no acute change  Clinical Impression: normal sinus rhythm, no acute chamge    Cardiac Monitor Strip Interpretation  Interpreted by me  Monitor strip interpreted for greater than 10 seconds  Rhythm: normal sinus  Rate: normal  Ectopy: none  ST Segments: normal      Radiology / Procedures:     CT HEAD WO CONTRAST (Final result)  Result time 02/15/23 23:34:49  Final result by Erich Holloway MD (02/15/23 23:34:49)                Impression:    1. No acute intracranial abnormality. 2. No significant stenosis, aneurysm, or arterial injury CTA of the head and   neck.              Narrative:    EXAMINATION:   CTA OF THE HEAD AND NECK WITH CONTRAST; CT OF THE HEAD WITHOUT CONTRAST   2/15/2023 11:03 pm:     TECHNIQUE:   CTA of the head and neck was performed with the administration of intravenous   contrast. Multiplanar reformatted images are provided for review. MIP images   are provided for review. Stenosis of the internal carotid arteries measured   using NASCET criteria. Automated exposure control, iterative reconstruction,   and/or weight based adjustment of the mA/kV was utilized to reduce the   radiation dose to as low as reasonably achievable.; CT of the head was   performed without the administration of intravenous contrast. Automated   exposure control, iterative reconstruction, and/or weight based adjustment of   the mA/kV was utilized to reduce the radiation dose to as low as reasonably   achievable. Noncontrast CT of the head with reconstructed 2-D images are also   provided for review. COMPARISON:   CT head 06/08/2013. Brain MRI 01/19/2011     HISTORY:   ORDERING SYSTEM PROVIDED HISTORY: Lightheaded, head feels funny   TECHNOLOGIST PROVIDED HISTORY:   Reason for exam:->Lightheaded, head feels funny   Has a \"code stroke\" or \"stroke alert\" been called? ->No   Decision Support Exception - unselect if not a suspected or confirmed   emergency medical condition->Emergency Medical Condition ([place])   Reason for Exam: Lightheaded, head feels funny; ORDERING SYSTEM PROVIDED   HISTORY: Head feels funny, lightheaded   TECHNOLOGIST PROVIDED HISTORY:   Has a \"code stroke\" or \"stroke alert\" been called? ->No   Reason for exam:->Head feels funny, lightheaded   Decision Support Exception - unselect if not a suspected or confirmed   emergency medical condition->Emergency Medical Condition (MA)   Is the patient pregnant?->No   Reason for Exam: Lightheaded, head feels funny     FINDINGS:     CT HEAD:     BRAIN/VENTRICLES:  No acute intracranial hemorrhage or extraaxial fluid   collection. Grey-white differentiation is maintained. No evidence of mass,   mass effect or midline shift. No evidence of hydrocephalus.    Scattered   hypoattenuating supratentorial white matter lesions, nonspecific but   consistent with mild chronic ischemic white matter changes. Left lateral   ventricle is mildly larger than the right, similar to prior. ORBITS: The visualized portion of the orbits demonstrate no acute abnormality. SINUSES:  The visualized paranasal sinuses and mastoid air cells demonstrate   no acute abnormality. SOFT TISSUES/SKULL: No acute abnormality of the visualized skull or soft   tissues. CTA NECK:     AORTIC ARCH/ARCH VESSELS: No dissection or arterial injury. No significant   stenosis of the brachiocephalic or subclavian arteries. CAROTID ARTERIES: No dissection, arterial injury, or hemodynamically   significant stenosis by NASCET criteria. Retropharyngeal course of both   cervical ICAs. VERTEBRAL ARTERIES: No dissection, arterial injury, or significant stenosis. SOFT TISSUES: The lung apices are clear. No cervical or superior mediastinal   lymphadenopathy. The larynx and pharynx are unremarkable. No acute   abnormality of the salivary and thyroid glands. BONES: No acute osseous abnormality. Disc osteophyte complex results in   moderate spinal canal narrowing at C5-C6. CTA HEAD:     ANTERIOR CIRCULATION: No significant stenosis of the intracranial internal   carotid, anterior cerebral, or middle cerebral arteries. No aneurysm. POSTERIOR CIRCULATION: No significant stenosis of the vertebral, basilar, or   posterior cerebral arteries. No aneurysm. OTHER: No dural venous sinus thrombosis on this non-dedicated study. CTA HEAD NECK W CONTRAST (Final result)  Result time 02/15/23 23:34:49  Final result by Estevan Lauren MD (02/15/23 23:34:49)                Impression:    1. No acute intracranial abnormality. 2. No significant stenosis, aneurysm, or arterial injury CTA of the head and   neck.              Narrative:    EXAMINATION:   CTA OF THE HEAD AND NECK WITH CONTRAST; CT OF THE HEAD WITHOUT CONTRAST 2/15/2023 11:03 pm:     TECHNIQUE:   CTA of the head and neck was performed with the administration of intravenous   contrast. Multiplanar reformatted images are provided for review. MIP images   are provided for review. Stenosis of the internal carotid arteries measured   using NASCET criteria. Automated exposure control, iterative reconstruction,   and/or weight based adjustment of the mA/kV was utilized to reduce the   radiation dose to as low as reasonably achievable.; CT of the head was   performed without the administration of intravenous contrast. Automated   exposure control, iterative reconstruction, and/or weight based adjustment of   the mA/kV was utilized to reduce the radiation dose to as low as reasonably   achievable. Noncontrast CT of the head with reconstructed 2-D images are also   provided for review. COMPARISON:   CT head 06/08/2013. Brain MRI 01/19/2011     HISTORY:   ORDERING SYSTEM PROVIDED HISTORY: Lightheaded, head feels funny   TECHNOLOGIST PROVIDED HISTORY:   Reason for exam:->Lightheaded, head feels funny   Has a \"code stroke\" or \"stroke alert\" been called? ->No   Decision Support Exception - unselect if not a suspected or confirmed   emergency medical condition->Emergency Medical Condition (MA)   Reason for Exam: Lightheaded, head feels funny; ORDERING SYSTEM PROVIDED   HISTORY: Head feels funny, lightheaded   TECHNOLOGIST PROVIDED HISTORY:   Has a \"code stroke\" or \"stroke alert\" been called? ->No   Reason for exam:->Head feels funny, lightheaded   Decision Support Exception - unselect if not a suspected or confirmed   emergency medical condition->Emergency Medical Condition (MA)   Is the patient pregnant?->No   Reason for Exam: Lightheaded, head feels funny     FINDINGS:     CT HEAD:     BRAIN/VENTRICLES:  No acute intracranial hemorrhage or extraaxial fluid   collection. Grey-white differentiation is maintained. No evidence of mass,   mass effect or midline shift.   No evidence of hydrocephalus. Scattered   hypoattenuating supratentorial white matter lesions, nonspecific but   consistent with mild chronic ischemic white matter changes. Left lateral   ventricle is mildly larger than the right, similar to prior. ORBITS: The visualized portion of the orbits demonstrate no acute abnormality. SINUSES:  The visualized paranasal sinuses and mastoid air cells demonstrate   no acute abnormality. SOFT TISSUES/SKULL: No acute abnormality of the visualized skull or soft   tissues. CTA NECK:     AORTIC ARCH/ARCH VESSELS: No dissection or arterial injury. No significant   stenosis of the brachiocephalic or subclavian arteries. CAROTID ARTERIES: No dissection, arterial injury, or hemodynamically   significant stenosis by NASCET criteria. Retropharyngeal course of both   cervical ICAs. VERTEBRAL ARTERIES: No dissection, arterial injury, or significant stenosis. SOFT TISSUES: The lung apices are clear. No cervical or superior mediastinal   lymphadenopathy. The larynx and pharynx are unremarkable. No acute   abnormality of the salivary and thyroid glands. BONES: No acute osseous abnormality. Disc osteophyte complex results in   moderate spinal canal narrowing at C5-C6. CTA HEAD:     ANTERIOR CIRCULATION: No significant stenosis of the intracranial internal   carotid, anterior cerebral, or middle cerebral arteries. No aneurysm. POSTERIOR CIRCULATION: No significant stenosis of the vertebral, basilar, or   posterior cerebral arteries. No aneurysm. OTHER: No dural venous sinus thrombosis on this non-dedicated study.                Labs Reviewed   CBC WITH AUTO DIFFERENTIAL - Abnormal; Notable for the following components:       Result Value    WBC 11.1 (*)     MPV 12.4 (*)     Monocytes % 6.7 (*)     All other components within normal limits   COMPREHENSIVE METABOLIC PANEL - Abnormal; Notable for the following components:    BUN 25 (*)     ALT 54 (*) All other components within normal limits   TROPONIN       ED COURSE & MEDICAL DECISION MAKING:  Jose Elias Correia is a 45 y.o. female who presents to the ED, via EMS, for evaluation after having a syncopal episode. The patient states that she was sitting down to eat her dinner and then took her metformin as usual.  She began to feel lightheaded. When she stood up she became significantly more lightheaded and passed out, collapsing to the floor. She is unsure if she hit her head. She does not know how long she was unconscious for. She states that she woke up on the floor and tried to crawl to the door to call for help. Nobody could hear her to come and help her. She was able to crawl back to the chair and get up into the chair and called 911. She denied any preceding chest pain or palpitations. However, she was having chest pain yesterday for which she was seen in this emergency department and had a reassuring work-up. She denies any associated shortness of breath. She denies a headache but states that she just does not feel right. She feels as though her brain is \"short circuiting\". Symptoms were constant but have since resolved. Symptoms are worse when standing up and better with rest.  She denies fevers, chills, cough, shortness of breath, nausea, vomiting, numbness, tingling, weakness or any other complaints. History from : Patient    Limitations to history : None    Chronic conditions affecting care: None    Social Determinants : None    Records Reviewed : Other ED visit notes from 2/14/2023    On exam, the patient is afebrile and nontoxic appearing. She is hemodynamically stable and neurologically intact. Orthostatics are negative, however when the patient stood up she felt lightheaded and as though she was going to pass out. Physical exam is unremarkable. EKG shows a normal sinus rhythm with no ST elevation or depression.  Labs are obtained and are significant for mild leukocytosis with no other clinically significant lab abnormalities. The patient was treated with medications as below. Patient was given the following medications:  Medications   0.9 % sodium chloride bolus (1000 mLs IntraVENous Stopped 2/15/23 3081)          Diagnosis and Differential Diagnosis:  I suspect that the patient had a syncopal episode. The etiology is not entirely clear. I have a low suspicion for seizure, concussion, arrhythmia, psychosis, overdose, cardiac or respiratory arrest, status epilepticus, meningitis, or sepsis. Disposition Considerations: The patient continues to be symptomatic. She states she became lightheaded and almost passed out when she moved from the CT scan to the cot. Therefore, I recommended admission to the hospital and the patient was agreeable. I discussed the case with the hospitalist who will admit the patient for further treatment and care. The patient is currently in stable condition awaiting admission. I am the Primary Clinician of Record. Clinical Impression:  1. Syncope and collapse        Disposition referral (if applicable):  [address] [place] [address] Rd  [phone]          Disposition medications (if applicable):  Current Discharge Medication List            Comment: Please note this report has been produced using speech recognition software and may contain errors related to that system including errors in grammar, punctuation, and spelling, as well as words and phrases that may be inappropriate. If there are any questions or concerns please feel free to contact the dictating provider for clarification.         Jacki Mart MD  02/18/23 2104

## 2023-02-16 NOTE — PROGRESS NOTES
Physical Therapy  [facility] ACUTE CARE PHYSICAL THERAPY EVALUATION  Cheryl Hebert, 1970, 3023/3023-A, 2023    History  [place]:  The encounter diagnosis was Syncope and collapse.  Patient  has a past medical history of Anxiety, COPD (chronic obstructive pulmonary disease) ([facility]), Hypoglycemia, Panic attacks, Primary osteoarthritis of right knee, Separation anxiety, and TIA (transient ischemic attack).  Patient  has a past surgical history that includes Hysterectomy;  section; Tubal ligation; Cholecystectomy; Knee cartilage surgery; Total knee arthroplasty (Right, 2022); and Ovary removal.    Assessment:  Pt presents 1 day s/p admission for dizziness, syncope at home ; recent admit  -  for chest pain, 2 ER visits since for similar issues. Pt is from home alone, in 1 level apt w/ 4 [facility] PLOF, no AD prior. Pt is primarily limited by balance 2/2 dizziness, additional deficits include impaired strength, endurance, functional mobility. Recurrent syncopal episodes leading to falls, weakness impacting volume of activity she can complete, endurance limiting distance. Recommending SNF.    Complexity: Moderate  Prognosis: Good, deconditioning, dizziness, balance  Plan 2-3+/week, 1 week  Equipment: pend to next LOC    Recommendations for NURSING mobility: SPT    Subjective:  Patient states:  \"Oh I'm dizzy all the time.\"    Pain:  denies.    Communication with other providers:  Handoff to RN  Restrictions: fall risk; general precautions    Home Setup/Prior level of function  Social/Functional History  Lives With: Alone  Type of Home: Apartment  Home Layout: One level  Home Access: Stairs to enter with rails  Entrance Stairs - Number of Steps: 4  Entrance Stairs - Rails: Left  Bathroom Shower/Tub: Walk-in shower  Bathroom Toilet: Standard  Bathroom Equipment: Shower chair  Has the patient had two or more falls in the past year or any fall with injury in the past year?: Yes (1  recent fall due to dizziness)  ADL Assistance: Independent  Homemaking Assistance: Independent  Homemaking Responsibilities: Yes  Ambulation Assistance: Independent  Transfer Assistance: Independent  Active : No  Mode of Transportation: Car    Examination of body systems (includes body structures/functions, activity/participation limitations):  Observation:  Supine, awake, alert, agreeable. She is pleasant and dizzy t/o ; impacting ability to complete OOB. Supine /63, seated /64, standing /79. Tele, pulse ox, BP cuff in place upon arrival  Posture: good  Vision:  glasses all the time  Hearing:  WFL  Cardiopulmonary:  WFL  Cognition: A&O x 4 ; alert, follows commands w/o difficulty, attention intact, memory appears intact, good safety awareness, no cues needed for sequencing/setup, no cues needed for initiation, good insight into deficits    Musculoskeletal  ROM R/L: AROM WFL. Strength R/L:  grossly 4/5  Sensation: WFL  Tone: normotonic  Coordination: WFL  Proprioception: WFL    Mobility:  Supine to sit:  CGA  Transfers: min A for steady during completion  Sitting balance:  good. Standing balance:  fair.     Gait: 0 ft of unassisted ambulation using FWW ; limited by dizziness, [place] -, inc ALFREDA, moderate UE reliance, endurance limiting distance, reciprocal and steady, very guarded in quality and hesitant w/ fear of falling ; 20 ft at [facility] 6 Clicks Inpatient Mobility:  AM-PAC Inpatient Mobility Raw Score : 17    Goals:  Pt Goals: return to PLOF  Short Term Goals  Time Frame for Short Term Goals: 1 week  Short Term Goal 1: pt will complete bed mobility at mod ind  Short Term Goal 2: pt will complete transfers at sup level  Short Term Goal 3: pt will ambulate 150 ft using LRAD at sup level  Short Term Goal 4: pt will demonstrate minimal dizziness and presyncopal s/s allowing for OOB performed at SBA       Treatment plan:  Bed mobility, functional mobility, transfers, balance, gait, TA, TX, strength activities, neuro    Treatment:  Gait Training:  Cues were given for safety, sequence, device management, balance, posture, awareness, path.       Positioned for comfort and pressure relief  Safety: patient left in chair, call light within reach, RN notified, gait belt used, all needs met    Time:   Time in: 1332  Time out: 1354  Timed treatment minutes: 12  Total time + eval: 22    Electronically signed by:    Mathew Baer PT, DPT  2/16/2023, 3:13 PM

## 2023-02-16 NOTE — ED NOTES
ED TO INPATIENT SBAR HANDOFF    Patient Name: Jazz Mills   :  1970  45 y.o. MRN:  [id number]  Preferred Name  Cherise David  ED Room #:  ED26/ED-26  Family/Caregiver Present no   Restraints no   Sitter no   Sepsis Risk Score Sepsis Risk Score: 0.48    Situation  Code Status: Prior No additional code details. Allergies: Patient has no known allergies. Weight: No data found. Arrived from: home  Chief Complaint:   Chief Complaint   Patient presents with    Loss of Consciousness     Pt states she feels lightheaded and passed out when she stood up from the couch      Hospital Problem/Diagnosis:  Active Problems:    * No active hospital problems. *  Resolved Problems:    * No resolved hospital problems. *    Imaging:   CT HEAD WO CONTRAST   Final Result   1. No acute intracranial abnormality. 2. No significant stenosis, aneurysm, or arterial injury CTA of the head and   neck. CTA HEAD NECK W CONTRAST   Final Result   1. No acute intracranial abnormality. 2. No significant stenosis, aneurysm, or arterial injury CTA of the head and   neck. Abnormal labs:   Abnormal Labs Reviewed   CBC WITH AUTO DIFFERENTIAL - Abnormal; Notable for the following components:       Result Value    WBC 11.1 (*)     MPV 12.4 (*)     Monocytes % 6.7 (*)     All other components within normal limits   COMPREHENSIVE METABOLIC PANEL - Abnormal; Notable for the following components:    BUN 25 (*)     ALT 54 (*)     All other components within normal limits     Critical values: no     Abnormal Assessment Findings: dizzy upon standing.  Otherwise normal exam.     Background  History:   Past Medical History:   Diagnosis Date    Anxiety     COPD (chronic obstructive pulmonary disease) (HCC)     Hypoglycemia     history    Panic attacks     Primary osteoarthritis of right knee     Separation anxiety     TIA (transient ischemic attack)        Assessment    Vitals/MEWS: MEWS Score: 1  Level of Consciousness: Alert (0)   Vitals:    02/15/23 2124 02/15/23 2127 02/15/23 2230 02/15/23 2345   BP: 137/64 129/67 (!) 120/56 112/69   Pulse: 73 74 66 64   Resp:    20   Temp:    98 °F (36.7 °C)   TempSrc:    Oral   SpO2: 96% 96% 97% 96%     FiO2 (%): room air  O2 Flow Rate: O2 Device: None (Room air)    Cardiac Rhythm: SR  Pain Assessment: 0/10 [] Verbal [] Earlie Cuca Scale  Pain Scale: Pain Assessment  Pain Assessment: None - Denies Pain  Last documented pain score (0-10 scale)    Last documented pain medication administered: n.a  Mental Status: oriented  NIH Score: NIH     C-SSRS: Risk of Suicide: No Risk  Bedside swallow:    [place] Coma Scale (GCS): Aryan Coma Scale  Eye Opening: Spontaneous  Best Verbal Response: Oriented  Best Motor Response: Obeys commands  Aryan Coma Scale Score: 15  Active LDA's:   Peripheral IV 02/15/23 Right Antecubital (Active)   Site Assessment Clean, dry & intact 02/15/23 2025   Line Status Blood return noted 02/15/23 2025   Phlebitis Assessment No symptoms 02/15/23 2025   Infiltration Assessment 0 02/15/23 2025     PO Status: Regular  Pertinent or High Risk Medications/Drips: no   o If Yes, please provide details: n/a  Pending Blood Product Administration: no     You may also review the ED PT Care Timeline found under the Summary Nursing Index tab. Recommendation    Pending orders see chart review  Plan for Discharge (if known): Additional Comments:  Pt calls out frequently stating that she \"doesn't feel right\". All vitals and blood sugars have been stable every time. Orthostatic vitals signs completed and were negative. Pt strong and able to walk, but suggest a standby assist to ensure safety.    If any further questions, please call Sending RN at [phone]    Electronically signed by: Electronically signed by Emily Baptiste RN on 2/15/2023 at 11:48 PM       Emily Baptiste RN  02/15/23 7233

## 2023-02-17 ENCOUNTER — TELEPHONE (OUTPATIENT)
Dept: CARDIOLOGY CLINIC | Age: 53
End: 2023-02-17

## 2023-02-17 PROBLEM — F41.1 GAD (GENERALIZED ANXIETY DISORDER): Status: ACTIVE | Noted: 2023-02-17

## 2023-02-17 LAB
25(OH)D3 SERPL-MCNC: 12.64 NG/ML
ALBUMIN SERPL-MCNC: 4.1 GM/DL (ref 3.4–5)
ALP BLD-CCNC: 66 IU/L (ref 40–128)
ALT SERPL-CCNC: 47 U/L (ref 10–40)
ANION GAP SERPL CALCULATED.3IONS-SCNC: 10 MMOL/L (ref 4–16)
AST SERPL-CCNC: 17 IU/L (ref 15–37)
BASOPHILS ABSOLUTE: 0 K/CU MM
BASOPHILS RELATIVE PERCENT: 0.4 % (ref 0–1)
BILIRUB SERPL-MCNC: 0.6 MG/DL (ref 0–1)
BUN SERPL-MCNC: 15 MG/DL (ref 6–23)
CALCIUM SERPL-MCNC: 9.5 MG/DL (ref 8.3–10.6)
CHLORIDE BLD-SCNC: 107 MMOL/L (ref 99–110)
CO2: 25 MMOL/L (ref 21–32)
CREAT SERPL-MCNC: 0.6 MG/DL (ref 0.6–1.1)
DIFFERENTIAL TYPE: ABNORMAL
EOSINOPHILS ABSOLUTE: 0.2 K/CU MM
EOSINOPHILS RELATIVE PERCENT: 2.2 % (ref 0–3)
FOLATE SERPL-MCNC: 6.7 NG/ML (ref 3.1–17.5)
GFR SERPL CREATININE-BSD FRML MDRD: >60 ML/MIN/1.73M2
GLUCOSE BLD-MCNC: 103 MG/DL (ref 70–99)
GLUCOSE BLD-MCNC: 104 MG/DL (ref 70–99)
GLUCOSE BLD-MCNC: 107 MG/DL (ref 70–99)
GLUCOSE BLD-MCNC: 107 MG/DL (ref 70–99)
GLUCOSE BLD-MCNC: 99 MG/DL (ref 70–99)
GLUCOSE SERPL-MCNC: 99 MG/DL (ref 70–99)
HCT VFR BLD CALC: 43.7 % (ref 37–47)
HEMOGLOBIN: 14.1 GM/DL (ref 12.5–16)
IMMATURE NEUTROPHIL %: 0.4 % (ref 0–0.43)
LYMPHOCYTES ABSOLUTE: 3 K/CU MM
LYMPHOCYTES RELATIVE PERCENT: 29.9 % (ref 24–44)
MCH RBC QN AUTO: 28.6 PG (ref 27–31)
MCHC RBC AUTO-ENTMCNC: 32.3 % (ref 32–36)
MCV RBC AUTO: 88.6 FL (ref 78–100)
MONOCYTES ABSOLUTE: 0.8 K/CU MM
MONOCYTES RELATIVE PERCENT: 7.8 % (ref 0–4)
NUCLEATED RBC %: 0 %
PDW BLD-RTO: 13 % (ref 11.7–14.9)
PHOSPHORUS: 4.7 MG/DL (ref 2.5–4.9)
PLATELET # BLD: 163 K/CU MM (ref 140–440)
PMV BLD AUTO: 12.4 FL (ref 7.5–11.1)
POTASSIUM SERPL-SCNC: 4.1 MMOL/L (ref 3.5–5.1)
RBC # BLD: 4.93 M/CU MM (ref 4.2–5.4)
SEGMENTED NEUTROPHILS ABSOLUTE COUNT: 5.9 K/CU MM
SEGMENTED NEUTROPHILS RELATIVE PERCENT: 59.3 % (ref 36–66)
SODIUM BLD-SCNC: 142 MMOL/L (ref 135–145)
TOTAL IMMATURE NEUTOROPHIL: 0.04 K/CU MM
TOTAL NUCLEATED RBC: 0 K/CU MM
TOTAL PROTEIN: 5.9 GM/DL (ref 6.4–8.2)
VITAMIN B-12: 404.8 PG/ML (ref 211–911)
WBC # BLD: 9.9 K/CU MM (ref 4–10.5)

## 2023-02-17 PROCEDURE — G0378 HOSPITAL OBSERVATION PER HR: HCPCS

## 2023-02-17 PROCEDURE — 82306 VITAMIN D 25 HYDROXY: CPT

## 2023-02-17 PROCEDURE — 96372 THER/PROPH/DIAG INJ SC/IM: CPT

## 2023-02-17 PROCEDURE — 6370000000 HC RX 637 (ALT 250 FOR IP): Performed by: PSYCHIATRY & NEUROLOGY

## 2023-02-17 PROCEDURE — 6370000000 HC RX 637 (ALT 250 FOR IP): Performed by: STUDENT IN AN ORGANIZED HEALTH CARE EDUCATION/TRAINING PROGRAM

## 2023-02-17 PROCEDURE — 2580000003 HC RX 258: Performed by: STUDENT IN AN ORGANIZED HEALTH CARE EDUCATION/TRAINING PROGRAM

## 2023-02-17 PROCEDURE — 99232 SBSQ HOSP IP/OBS MODERATE 35: CPT | Performed by: INTERNAL MEDICINE

## 2023-02-17 PROCEDURE — 82962 GLUCOSE BLOOD TEST: CPT

## 2023-02-17 PROCEDURE — APPSS30 APP SPLIT SHARED TIME 16-30 MINUTES

## 2023-02-17 PROCEDURE — 82746 ASSAY OF FOLIC ACID SERUM: CPT

## 2023-02-17 PROCEDURE — 80053 COMPREHEN METABOLIC PANEL: CPT

## 2023-02-17 PROCEDURE — 85025 COMPLETE CBC W/AUTO DIFF WBC: CPT

## 2023-02-17 PROCEDURE — 6360000002 HC RX W HCPCS: Performed by: STUDENT IN AN ORGANIZED HEALTH CARE EDUCATION/TRAINING PROGRAM

## 2023-02-17 PROCEDURE — 84100 ASSAY OF PHOSPHORUS: CPT

## 2023-02-17 PROCEDURE — 36415 COLL VENOUS BLD VENIPUNCTURE: CPT

## 2023-02-17 PROCEDURE — 82607 VITAMIN B-12: CPT

## 2023-02-17 RX ORDER — VITAMIN B COMPLEX
1000 TABLET ORAL DAILY
Status: DISCONTINUED | OUTPATIENT
Start: 2023-02-17 | End: 2023-02-18 | Stop reason: HOSPADM

## 2023-02-17 RX ORDER — LORAZEPAM 0.5 MG/1
0.5 TABLET ORAL 2 TIMES DAILY PRN
Status: DISCONTINUED | OUTPATIENT
Start: 2023-02-17 | End: 2023-02-18 | Stop reason: HOSPADM

## 2023-02-17 RX ADMIN — FLUOXETINE 20 MG: 20 CAPSULE ORAL at 20:22

## 2023-02-17 RX ADMIN — SODIUM CHLORIDE, PRESERVATIVE FREE 10 ML: 5 INJECTION INTRAVENOUS at 09:22

## 2023-02-17 RX ADMIN — SODIUM CHLORIDE, PRESERVATIVE FREE 10 ML: 5 INJECTION INTRAVENOUS at 20:23

## 2023-02-17 RX ADMIN — MICONAZOLE NITRATE: 2 POWDER TOPICAL at 09:22

## 2023-02-17 RX ADMIN — LEVOTHYROXINE SODIUM 50 MCG: 50 TABLET ORAL at 06:03

## 2023-02-17 RX ADMIN — Medication 1000 UNITS: at 11:47

## 2023-02-17 RX ADMIN — HYDROXYZINE HYDROCHLORIDE 25 MG: 25 TABLET, FILM COATED ORAL at 12:20

## 2023-02-17 RX ADMIN — LORAZEPAM 0.5 MG: 0.5 TABLET ORAL at 22:32

## 2023-02-17 RX ADMIN — ATORVASTATIN CALCIUM 40 MG: 40 TABLET, FILM COATED ORAL at 20:22

## 2023-02-17 RX ADMIN — HYDROXYZINE HYDROCHLORIDE 25 MG: 25 TABLET, FILM COATED ORAL at 06:06

## 2023-02-17 RX ADMIN — ROPINIROLE HYDROCHLORIDE 0.75 MG: 0.25 TABLET, FILM COATED ORAL at 20:22

## 2023-02-17 RX ADMIN — ENOXAPARIN SODIUM 30 MG: 100 INJECTION SUBCUTANEOUS at 20:20

## 2023-02-17 RX ADMIN — ENOXAPARIN SODIUM 30 MG: 100 INJECTION SUBCUTANEOUS at 09:21

## 2023-02-17 RX ADMIN — MICONAZOLE NITRATE: 2 POWDER TOPICAL at 20:31

## 2023-02-17 NOTE — DISCHARGE INSTR - COC
Continuity of Care Form    Patient Name: Kirstie Farrell   :  1970  MRN:  [id number]    Admit date:  2/15/2023  Discharge date:  ***    Code Status Order: Full Code   Advance Directives:     Admitting Physician:  Luz Maria Young MD  PCP: Maria Esther Redman MD    Discharging Nurse: [facility] Unit/Room#: 5632/4978-V  Discharging Unit Phone Number: ***    Emergency Contact:   Extended Emergency Contact Information  Primary Emergency Contact: fern rain  Home Phone: [phone]  Relation: Child    Past Surgical History:  Past Surgical History:   Procedure Laterality Date     SECTION      CHOLECYSTECTOMY      HYSTERECTOMY (CERVIX STATUS UNKNOWN)      KNEE CARTILAGE SURGERY      R knee     OVARY REMOVAL      TOTAL KNEE ARTHROPLASTY Right 2022    RIGHT KNEE TOTAL ARTHROPLASTY ROBOTIC performed by Millie Rojas MD at [address] [place] [address]         Immunization History:   Immunization History   Administered Date(s) Administered    COVID-19, 0 [facility] border, Primary or Immunocompromised, (age 12y+), IM, 100 mcg/0.5mL 2021, 10/29/2021       Active Problems:  Patient Active Problem List   Diagnosis Code    Chest pain at rest R07.9    Left sided numbness R20.0    Bipolar 1 disorder (HCC) F31.9    Tobacco abuse Z72.0    SOB (shortness of breath) R06.02    Excessive daytime sleepiness G47.19    SOB (shortness of breath) on exertion R06.02    Anemia D64.9    Restless leg syndrome G25.81    ROXI (obstructive sleep apnea) G47.33    Primary osteoarthritis of right knee M17.11    S/P total knee replacement, right Z96.651    Syncope and collapse R55       Isolation/Infection:   Isolation            No Isolation          Patient Infection Status       Infection Onset Added Last Indicated Last Indicated By Review Planned Expiration Resolved Resolved By    None active    Resolved    COVID-19 (Rule Out) 23 Respiratory Panel, Molecular, with COVID-19 (Restricted: peds pts or suitable admitted adults) (Ordered)   02/01/23 Rule-Out Test Resulted    COVID-19 (Rule Out) 02/16/22 02/16/22 02/16/22 COVID-19 (Ordered)   02/16/22 Rule-Out Test Resulted    MRSA  03/08/13 03/08/13 Ace Landin RN   11/27/17 Cortez Nicolas, LPN    wound 4/1/87            Nurse Assessment:  Last Vital Signs: /69   Pulse 61   Temp 98 °F (36.7 °C) (Oral)   Resp 19   Ht 5' 3\" (1.6 m)   Wt 242 lb (109.8 kg)   SpO2 95%   BMI 42.87 kg/m²     Last documented pain score (0-10 scale):    Last Weight:   Wt Readings from Last 1 Encounters:   02/16/23 242 lb (109.8 kg)     Mental Status:  oriented and alert    IV Access:  - None    Nursing Mobility/ADLs:  Walking   Assisted  Transfer  Assisted  Bathing  Assisted  Dressing  Assisted  Toileting  Assisted  Feeding  Independent  Med Admin  Assisted  Med Delivery   whole    Wound Care Documentation and Therapy:  Incision 02/21/22 Knee Anterior;Right (Active)   Number of days: 360        Elimination:  Continence: Bowel: Yes  Bladder: Yes  Urinary Catheter: None   Colostomy/Ileostomy/Ileal Conduit: No       Date of Last BM: 02/17/23    No intake or output data in the 24 hours ending 02/17/23 0954  No intake/output data recorded. Safety Concerns:     History of Falls (last 30 days)    Impairments/Disabilities:      None      Patient's personal belongings (please select all that are sent with patient):  None    RN SIGNATURE:  Electronically signed by Yaa Beatty RN on 2/18/23 at 9:53 AM EST        PHYSICIAN SECTION    Prognosis: Fair    Condition at Discharge: Stable    Rehab Potential (if transferring to Rehab):  Fair    Recommended Labs or Other Treatments After Discharge: glucose monitoring, PT/OT    Nutrition Therapy:  Current Nutrition Therapy:   - Oral Diet:  Carb Control 5 carbs/meal (2000kcals/day)    Routes of Feeding: Oral  Liquids: No Restrictions  Daily Fluid Restriction: no  Last Modified Barium Swallow with Video (Video Swallowing Test): not done    Treatments at the Time of Hospital Discharge:   Respiratory Treatments: prn  Oxygen Therapy:  is not on home oxygen therapy. Ventilator:    - No ventilator support    Rehab Therapies: Physical Therapy and Occupational Therapy  Weight Bearing Status/Restrictions: No weight bearing restrictions  Other Medical Equipment (for information only, NOT a DME order):  cane and walker  Other Treatments:     Physician Certification: I certify the above information and transfer of Piper Harris  is necessary for the continuing treatment of the diagnosis listed and that she requires [facility] for greater 30 days.      Update Admission H&P: No change in H&P    PHYSICIAN SIGNATURE:  Electronically signed by Stephen Chávez MD on 2/18/23 at 12:45 PM EST

## 2023-02-17 NOTE — CARE COORDINATION
CM contacted by [facility] approved and they can accept today if medically ready. PS to Dr. Quigley Courts to update, rapid covid requested. 3:54 PM   CM updated [facility] Pt skye good through 8/00. Dr. Quigley Courts updated. Discharging RN-  Pt accepted to [facility]. At discharge, please set up transport with [facility] [phone]. Call report to [phone].   Fax discharge summary/AVS to [phone]

## 2023-02-17 NOTE — PROGRESS NOTES
Pt to wear 30 day event monitor will call for monitor when she transfers to [facility]

## 2023-02-17 NOTE — CONSULTS
Psychiatric Consult  [place]  [id number]  2/15/2023  02/17/23      ID: Patient is a 45 y.o. female    CC: my anxiety medications is not working and my anxiety is getting worst    HPI:  Pt is a 46 yo  female who presents for exacerbation of anxiety. Pt noted recent exacarbation of anxiety but feels her current medications need adjusted. Pt noted she currently feels safe and comfortable on the unit. Pt was in agreement with treatment team.  Pt was polite and cordial during the interview process. Pt noted she is doing Frann Session today. \"  Pt noted she is sleeping \"okay. ..about 7 hours last night. \"  Pt noted her apptetite is okay. Pt rated her depresssion a \"1,\" on a scale of zero to ten with ten being the worst and zero being none. Pt rated her anxiety a \"6,\" on the same scale. Pt denied any thoughts to harm herself or anyone else. Pt denied any auditory or visiual hallucintations.       Pt denied any hx of seizures, TBIs, Hep C or HIV  No TD noted, AIMS=0,     Pt denied any hx of previous inpt psychiatric admissions  Pt denied any previous suicide attempts  Pt denied any family hx of suicides  Pt denied any family mental health hx    Pt denied any hx of abuse trauma or neglect, physical sexual or emotional.      Alcohol: denies any current  Street drugs: denies any current  Tobacco: denies any current  Caffeine: 2-3 per day      Past Psychiatric History:   See note above       Family Psychiatric History:   Family History   Problem Relation Age of Onset    Cancer Mother     Cancer Father     Breast Cancer Neg Hx     Ovarian Cancer Neg Hx         Allergies:  No Known Allergies     OBJECTIVE  Vital Signs:  Vitals:    02/17/23 1514   BP: (!) 111/51   Pulse: 62   Resp: 16   Temp: 98.2 °F (36.8 °C)   SpO2: 95%       Labs:  Recent Results (from the past 48 hour(s))   CBC with Auto Differential    Collection Time: 02/15/23  8:22 PM   Result Value Ref Range    WBC 11.1 (H) 4.0 - 10.5 K/CU MM    RBC 4. 91 4.2 - 5.4 M/CU MM    Hemoglobin 14.2 12.5 - 16.0 GM/DL    Hematocrit 42.6 37 - 47 %    MCV 86.8 78 - 100 FL    MCH 28.9 27 - 31 PG    MCHC 33.3 32.0 - 36.0 %    RDW 13.0 11.7 - 14.9 %    Platelets 811 185 - 017 K/CU MM    MPV 12.4 (H) 7.5 - 11.1 FL    Differential Type AUTOMATED DIFFERENTIAL     Segs Relative 58.4 36 - 66 %    Lymphocytes % 31.9 24 - 44 %    Monocytes % 6.7 (H) 0 - 4 %    Eosinophils % 2.1 0 - 3 %    Basophils % 0.5 0 - 1 %    Segs Absolute 6.5 K/CU MM    Lymphocytes Absolute 3.5 K/CU MM    Monocytes Absolute 0.7 K/CU MM    Eosinophils Absolute 0.2 K/CU MM    Basophils Absolute 0.1 K/CU MM    Nucleated RBC % 0.0 %    Total Nucleated RBC 0.0 K/CU MM    Total Immature Neutrophil 0.04 K/CU MM    Immature Neutrophil % 0.4 0 - 0.43 %   Troponin    Collection Time: 02/15/23  8:22 PM   Result Value Ref Range    Troponin T <0.010 <0.01 NG/ML   Comprehensive Metabolic Panel    Collection Time: 02/15/23  8:22 PM   Result Value Ref Range    Sodium 140 135 - 145 MMOL/L    Potassium 3.7 3.5 - 5.1 MMOL/L    Chloride 104 99 - 110 mMol/L    CO2 23 21 - 32 MMOL/L    BUN 25 (H) 6 - 23 MG/DL    Creatinine 0.7 0.6 - 1.1 MG/DL    Est, Glom Filt Rate >60 >60 mL/min/1.73m2    Glucose 93 70 - 99 MG/DL    Calcium 9.5 8.3 - 10.6 MG/DL    Albumin 4.3 3.4 - 5.0 GM/DL    Total Protein 6.8 6.4 - 8.2 GM/DL    Total Bilirubin 0.6 0.0 - 1.0 MG/DL    ALT 54 (H) 10 - 40 U/L    AST 22 15 - 37 IU/L    Alkaline Phosphatase 70 40 - 129 IU/L    Anion Gap 13 4 - 16   Hemoglobin A1C    Collection Time: 02/15/23  8:22 PM   Result Value Ref Range    Hemoglobin A1C 6.2 4.2 - 6.3 %    eAG 131 mg/dL   EKG 12 Lead    Collection Time: 02/15/23  8:24 PM   Result Value Ref Range    Ventricular Rate 69 BPM    Atrial Rate 69 BPM    P-R Interval 178 ms    QRS Duration 62 ms    Q-T Interval 370 ms    QTc Calculation (Bazett) 396 ms    P Axis 62 degrees    R Axis 3 degrees    T Axis 11 degrees    Diagnosis       Normal sinus rhythm  Normal ECG  When compared with ECG of 14-FEB-2023 15:43,  No significant change was found  Confirmed by Carolyn Gan MD, Norm Dues ([id number]) on 2/16/2023 10:27:12 PM     POCT Glucose    Collection Time: 02/15/23  8:52 PM   Result Value Ref Range    POC Glucose 94 70 - 99 MG/DL   POCT Glucose    Collection Time: 02/15/23 10:22 PM   Result Value Ref Range    POC Glucose 84 70 - 99 MG/DL   Lipid Panel    Collection Time: 02/16/23  1:00 AM   Result Value Ref Range    Triglycerides 112 <150 MG/DL    Cholesterol 94 <200 MG/DL    HDL 41 >40 MG/DL    LDL Calculated 31 <100 MG/DL   POCT Glucose    Collection Time: 02/16/23  2:35 AM   Result Value Ref Range    POC Glucose 92 70 - 99 MG/DL   POCT Glucose    Collection Time: 02/16/23  8:16 AM   Result Value Ref Range    POC Glucose 108 (H) 70 - 99 MG/DL   POCT Glucose    Collection Time: 02/16/23 11:03 AM   Result Value Ref Range    POC Glucose 104 (H) 70 - 99 MG/DL   Echocardiogram complete 2D with doppler with color    Collection Time: 02/16/23  2:20 PM   Result Value Ref Range    Left Ventricular Ejection Fraction 60     LVEF MODALITY ECHO    Troponin    Collection Time: 02/16/23  2:41 PM   Result Value Ref Range    Troponin T <0.010 <0.01 NG/ML   POCT Glucose    Collection Time: 02/16/23  5:17 PM   Result Value Ref Range    POC Glucose 109 (H) 70 - 99 MG/DL   POCT Glucose    Collection Time: 02/16/23  7:42 PM   Result Value Ref Range    POC Glucose 108 (H) 70 - 99 MG/DL   Vitamin B12 & Folate    Collection Time: 02/17/23  2:10 AM   Result Value Ref Range    Vitamin B-12 404.8 211 - 911 pg/ml    Folate 6.7 3.1 - 17.5 NG/ML   Vitamin D 25 Hydroxy    Collection Time: 02/17/23  2:10 AM   Result Value Ref Range    Vit D, 25-Hydroxy 12.64 (L) >20 NG/ML   CBC with Auto Differential    Collection Time: 02/17/23  2:10 AM   Result Value Ref Range    WBC 9.9 4.0 - 10.5 K/CU MM    RBC 4.93 4.2 - 5.4 M/CU MM    Hemoglobin 14.1 12.5 - 16.0 GM/DL    Hematocrit 43.7 37 - 47 %    MCV 88.6 78 - 100 FL    MCH 28.6 27 - 31 PG    MCHC 32.3 32.0 - 36.0 %    RDW 13.0 11.7 - 14.9 %    Platelets 553 072 - 275 K/CU MM    MPV 12.4 (H) 7.5 - 11.1 FL    Differential Type AUTOMATED DIFFERENTIAL     Segs Relative 59.3 36 - 66 %    Lymphocytes % 29.9 24 - 44 %    Monocytes % 7.8 (H) 0 - 4 %    Eosinophils % 2.2 0 - 3 %    Basophils % 0.4 0 - 1 %    Segs Absolute 5.9 K/CU MM    Lymphocytes Absolute 3.0 K/CU MM    Monocytes Absolute 0.8 K/CU MM    Eosinophils Absolute 0.2 K/CU MM    Basophils Absolute 0.0 K/CU MM    Nucleated RBC % 0.0 %    Total Nucleated RBC 0.0 K/CU MM    Total Immature Neutrophil 0.04 K/CU MM    Immature Neutrophil % 0.4 0 - 0.43 %   Comprehensive Metabolic Panel w/ Reflex to MG    Collection Time: 02/17/23  2:10 AM   Result Value Ref Range    Sodium 142 135 - 145 MMOL/L    Potassium 4.1 3.5 - 5.1 MMOL/L    Chloride 107 99 - 110 mMol/L    CO2 25 21 - 32 MMOL/L    BUN 15 6 - 23 MG/DL    Creatinine 0.6 0.6 - 1.1 MG/DL    Est, Glom Filt Rate >60 >60 mL/min/1.73m2    Glucose 99 70 - 99 MG/DL    Calcium 9.5 8.3 - 10.6 MG/DL    Albumin 4.1 3.4 - 5.0 GM/DL    Total Protein 5.9 (L) 6.4 - 8.2 GM/DL    Total Bilirubin 0.6 0.0 - 1.0 MG/DL    ALT 47 (H) 10 - 40 U/L    AST 17 15 - 37 IU/L    Alkaline Phosphatase 66 40 - 128 IU/L    Anion Gap 10 4 - 16   Phosphorus    Collection Time: 02/17/23  2:10 AM   Result Value Ref Range    Phosphorus 4.7 2.5 - 4.9 MG/DL   POCT Glucose    Collection Time: 02/17/23  2:13 AM   Result Value Ref Range    POC Glucose 103 (H) 70 - 99 MG/DL   POCT Glucose    Collection Time: 02/17/23  8:03 AM   Result Value Ref Range    POC Glucose 107 (H) 70 - 99 MG/DL   POCT Glucose    Collection Time: 02/17/23 11:02 AM   Result Value Ref Range    POC Glucose 99 70 - 99 MG/DL   POCT Glucose    Collection Time: 02/17/23  4:06 PM   Result Value Ref Range    POC Glucose 107 (H) 70 - 99 MG/DL       Review of Systems:  Reports of no current cardiovascular, respiratory, gastrointestinal, genitourinary, integumentary, neurological, muscuoskeletal, or immunological symptoms today. PSYCHIATRIC: See HPI above. Review of Systems:  Reports of no current cardiovascular, respiratory, gastrointestinal, genitourinary, integumentary, neurological, muscuoskeletal, or immunological symptoms today. PSYCHIATRIC: See HPI above. Neurologic examination:  Mental status: The patient is alert, attentive, and oriented. Speech is clear and fluent with good repetition, comprehension, and naming. She recalls 3/3 objects at 5 minutes. PSYCHIATRIC EXAMINATION / MENTAL STATUS EXAM         General appearance: [x] appears age, []  appears older than stated age,               [x]  adequately dressed and groomed, [] disheveled,               [x]  in no acute distress, [] appears mildly distressed, [] other           MUSCULOSKELETAL:   Gait:   [] normal, [] antalgic, [] unsteady, [x] gait not evaluated   Station:             [] erect, [] sitting, [x] recumbent, [] other        Strength/tone:  [x] muscle strength and tone appear consistent with age and                                        condition     [] atrophy      [] abnormal movements  PSYCHIATRIC:    Appearance: appears stated age. alert and oriented to person, place, time & situation. no acute distress. Adequate grooming and hygeine. Good eye contact. No prominent physical abnormalities. Attitude: Manner is cooperative and pleasant  Motor: No psychomotor agitation, retardation or abnormal movements noted  Speech: Clearly articulated; normal rate, volume, tone & amount. Language: intact understanding and production  Mood: okay  Affect: euthymic, full range, non-labile, congruent with mood and content of speech  Thought Production: Spontaneous. Thought Form: Coherent, linear, logical & goal-directed. No tangentiality or circumstantiality. No flight of ideas or loosening of associations.   Thought Content/Perceptions: No IRMA, no AVH, no delusion  Insight: fair  Judgment fair  Memory: Immediate, recent, and remote appear intact, though not formally tested. Attention: maintained throughout interview  Fund of knowledge: Average  Gait/Balance: WNL/WNL           Impression:   SARANYA    Problem List:   <principal problem not specified>    Pt does not require inpt psychiatric hospitalization at this time, pt to follow up with out pt mental health upon discharge once medically cleared. Plan:  1. Reviewed treatment plan with patient including medication risks, benefits, side effects. Obtained informed consent for treatment. 2. Psychiatric management:medication initiation and titration, recommend outpt mental health follow up, safe and theraputic environment. 3. Status of problem/condition: ?Improving  4. Medical co[facility]morbidities: [facility] appreciate assistance  5. Legal Status: voluntary  6. The treatment team reviewed with the patient the diagnosis and treatment recommendations to include the risks, benefits, and side effects of chosen medications. 7. The patient verbalized understanding and agreed with the treatment regimen as outlined above. 8. Medical records, Labs, Diagnotic tests reviewed  9. Interval History. 10. Review current labs  11. Continue current medications- start ativan 0.5 mg PO BID for anxiety  12. Supportive Therapy Provided  13. Pt had an opportunity to ask questions and address concerns  14. Pt encouraged to continue outpt  Therapy. 15. Pt was in agreement with treatment plan. 16. The risks benefits and side effects of medications were discussed with the patient, including alternatives and no treatment.

## 2023-02-17 NOTE — PROGRESS NOTES
[facility] PHYSICAL REHABILITATION [facility] [facility],Third [address]  Phone: [phone]    Fax [phone]                  Kaylin Garcia MD, Arnold Davis MD, Catalina Mantilla MD, MD Hanh Kenney MD Tamera Breaker, MD Dr. León Marks MD, APRN      Meir Ponce, APRHARI Duke, APRN    Blanca Bradley, APRN  Pauline Aguiar PA-C    CARDIOLOGY  NOTE      Name:  Hunter Mckinney /Age/Sex: 1970  (45 y.o. female)   MRN & CSN:  [id number] & [id number] Admission Date/Time: 2/15/2023  7:33 PM   Location:  [address] [place] [address] PCP: John Muñoz, [address] [place] [address] Parminder Day: 3    - Cardiology consult is for: Syncope      ASSESSMENT/ PLAN:  Syncope and collapse  -Recent Cardiolite and echo unremarkable  -Orthostatic blood pressure negative for orthostatic hypotension however symptoms fairly consistent with it.  -Advised compression stockings, hydration, smoking cessation products  -Patient scheduled to have event monitor  Hypertension   -On metoprolol 25 mg daily  Hyperlipidemia  -On Lipitor 40 mg daily  Type 2 diabetes mellitus  Hypothyroidism  -Per primary care      Cardiology will sign off, please call with any questions. Subjective:  Isaias Fernando is a 45 y. o.year old     Patient feeling well overall today without any acute concerns at this time. Patient likely needing placement for rehab. Excepted to Juan    Objective: Temperature:  Current - Temp: 98.2 °F (36.8 °C);  Max - Temp  Av.3 °F (36.8 °C)  Min: 98 °F (36.7 °C)  Max: 98.8 °F (37.1 °C)    Respiratory Rate : Resp  Av.3  Min: 15  Max: 19    Pulse Range: Pulse  Av.1  Min: 57  Max: 66    Blood Presuure Range:  Systolic (92UYT), HSS:694 , Min:99 , OHW:629   ; Diastolic (81HGZ), GLB:17, Min:48, Max:78      Pulse ox Range: SpO2  Av.5 %  Min: 93 %  Max: 95 %    24hr I & O:    Intake/Output Summary (Last 24 hours) at 2023 1524  Last data filed at 2023 1426  Gross per 24 hour   Intake 900 ml   Output --   Net 900 ml         BP (!) 111/51   Pulse 62   Temp 98.2 °F (36.8 °C) (Oral)   Resp 16   Ht 5' 3\" (1.6 m)   Wt 242 lb (109.8 kg)   SpO2 95%   BMI 42.87 kg/m²         TELEMETRY: Sinus      has a past medical history of Anxiety, COPD (chronic obstructive pulmonary disease) ([address] Hypoglycemia, Panic attacks, Primary osteoarthritis of right knee, Separation anxiety, and TIA (transient ischemic attack). has a past surgical history that includes Hysterectomy;  section; Tubal ligation; Cholecystectomy; Knee cartilage surgery; Total knee arthroplasty (Right, 2022); and Ovary removal.    Physical Exam  Constitutional:       General: She is not in acute distress. Appearance: She is not diaphoretic. HENT:      Head: Normocephalic and atraumatic. Right Ear: External ear normal.      Left Ear: External ear normal.      Nose: Nose normal.      Mouth/Throat:      Mouth: Mucous membranes are moist.   Eyes:      Extraocular Movements: Extraocular movements intact. Comments: Pupils equal and round   Neck:      Vascular: No carotid bruit. Cardiovascular:      Rate and Rhythm: Normal rate and regular rhythm. Pulses: Normal pulses. Heart sounds: S1 normal and S2 normal. No murmur heard. No friction rub. No gallop. Pulmonary:      Effort: Pulmonary effort is normal. No respiratory distress. Breath sounds: Normal breath sounds. No rales. Chest:      Chest wall: No tenderness. Abdominal:      Palpations: Abdomen is soft. Tenderness: There is no abdominal tenderness. Musculoskeletal:      Right lower leg: No edema. Left lower leg: No edema. Skin:     General: Skin is warm and dry. Capillary Refill: Capillary refill takes less than 2 seconds. Findings: No rash.       Comments: Skin turgor brisk   Neurological:      Mental Status: She is alert and oriented to person, place, and time. Mental status is at baseline. Psychiatric:         Behavior: Behavior is cooperative. Thought Content: Thought content normal.         Judgment: Judgment normal.        Medications:    Vitamin D  1,000 Units Oral Daily    nicotine  1 patch TransDERmal Daily    atorvastatin  40 mg Oral Nightly    FLUoxetine  20 mg Oral Daily    levothyroxine  50 mcg Oral Daily    metoprolol succinate  25 mg Oral Daily    rOPINIRole  0.75 mg Oral Nightly    sodium chloride flush  5-40 mL IntraVENous 2 times per day    enoxaparin  30 mg SubCUTAneous BID    insulin lispro  0-4 Units SubCUTAneous TID WC    insulin lispro  0-4 Units SubCUTAneous Nightly    miconazole   Topical BID      sodium chloride      dextrose       aluminum & magnesium hydroxide-simethicone, hydrALAZINE, sodium chloride flush, sodium chloride, acetaminophen, ondansetron **OR** ondansetron, glucose, dextrose bolus **OR** dextrose bolus, glucagon (rDNA), dextrose, hydrOXYzine HCl    Lab Data:  CBC:   Recent Labs     02/14/23  1551 02/15/23  2022 02/17/23  0210   WBC 11.0* 11.1* 9.9   HGB 13.5 14.2 14.1   HCT 41.0 42.6 43.7   MCV 87.8 86.8 88.6    179 163     BMP:   Recent Labs     02/14/23  1551 02/15/23  2022 02/17/23  0210    140 142   K 3.9 3.7 4.1    104 107   CO2 23 23 25   PHOS  --   --  4.7   BUN 16 25* 15   CREATININE 0.6 0.7 0.6     LIVER PROFILE:   Recent Labs     02/14/23  1551 02/15/23  2022 02/17/23  0210   AST 29 22 17   ALT 63* 54* 47*   BILITOT 0.6 0.6 0.6   ALKPHOS 73 70 66     PT/INR: No results for input(s): PROTIME, INR in the last 72 hours. APTT: No results for input(s): APTT in the last 72 hours. BNP:  No results for input(s): BNP in the last 72 hours.   TROPONIN:   Recent Labs     02/14/23  1957 02/15/23  2022 02/16/23  1441   TROPONINT <0.010 <0.010 <0.010     Labs, consult, tests reviewed          Wyatt Fletcher PA-C, 2/17/2023 3:24 PM    I have seen ,spoken to  and examined this patient personally, independently of the RHONA. I have reviewed the hospital care given to date and reviewed all pertinent labs and imaging. I have spoken with patient, nursing staff and provided written and verbal instructions . The above note has been reviewed     CARDIOLOGY ATTENDING ADDENDUM    HPI:  I have reviewed the above HPI  And agree with above     Pulse Range: Pulse  Av.1  Min: 57  Max: 66    Blood Presuure Range:  Systolic (71CKG), YTP:214 , Min:99 , CBH:990   ; Diastolic (37HQV), KOX:82, Min:48, Max:78      Pulse ox Range: SpO2  Av.5 %  Min: 93 %  Max: 95 %    24hr I & O:    Intake/Output Summary (Last 24 hours) at 2023 1555  Last data filed at 2023 1426  Gross per 24 hour   Intake 900 ml   Output --   Net 900 ml         BP (!) 111/51   Pulse 62   Temp 98.2 °F (36.8 °C) (Oral)   Resp 16   Ht 5' 3\" (1.6 m)   Wt 242 lb (109.8 kg)   SpO2 95%   BMI 42.87 kg/m²       Physical Exam:  General:  Awake, alert, NAD  Head:normal  Eye:normal  Neck:  No JVD   Chest:  Clear to auscultation, respiration easy  Cardiovascular:  RRR S1S2  Abdomen:   nontender  Extremities:  tr edema  Pulses; palpable  Neuro: grossly normal      MEDICAL DECISION MAKING;    Pt assessed , chart reviewed, patient examined examined , all available data was reviewed, following is the plan which was discussed with RHONA as well:    -Syncope etiology unclear patient appears to be orthostatic given that she passed out after standing from reclining chair we will obtain orthostatics and a carotid ultrasound  -Hyperlipidemia on Lipitor 40 mg p.o. daily  -Hypertension Toprol-XL 25    Event monitor outpatient  We will follow-up as needed             Jazz Cabezas MD [facility]

## 2023-02-18 VITALS
HEIGHT: 63 IN | RESPIRATION RATE: 16 BRPM | OXYGEN SATURATION: 98 % | WEIGHT: 242 LBS | SYSTOLIC BLOOD PRESSURE: 111 MMHG | BODY MASS INDEX: 42.88 KG/M2 | HEART RATE: 56 BPM | TEMPERATURE: 97.6 F | DIASTOLIC BLOOD PRESSURE: 61 MMHG

## 2023-02-18 LAB
ALBUMIN SERPL-MCNC: 4.3 GM/DL (ref 3.4–5)
ALP BLD-CCNC: 71 IU/L (ref 40–128)
ALT SERPL-CCNC: 53 U/L (ref 10–40)
ANION GAP SERPL CALCULATED.3IONS-SCNC: 10 MMOL/L (ref 4–16)
AST SERPL-CCNC: 23 IU/L (ref 15–37)
BASOPHILS ABSOLUTE: 0.1 K/CU MM
BASOPHILS RELATIVE PERCENT: 0.5 % (ref 0–1)
BILIRUB SERPL-MCNC: 0.6 MG/DL (ref 0–1)
BUN SERPL-MCNC: 15 MG/DL (ref 6–23)
CALCIUM SERPL-MCNC: 9.9 MG/DL (ref 8.3–10.6)
CHLORIDE BLD-SCNC: 105 MMOL/L (ref 99–110)
CO2: 27 MMOL/L (ref 21–32)
CREAT SERPL-MCNC: 0.7 MG/DL (ref 0.6–1.1)
DIFFERENTIAL TYPE: ABNORMAL
EOSINOPHILS ABSOLUTE: 0.2 K/CU MM
EOSINOPHILS RELATIVE PERCENT: 2.1 % (ref 0–3)
GFR SERPL CREATININE-BSD FRML MDRD: >60 ML/MIN/1.73M2
GLUCOSE BLD-MCNC: 154 MG/DL (ref 70–99)
GLUCOSE BLD-MCNC: 98 MG/DL (ref 70–99)
GLUCOSE BLD-MCNC: 99 MG/DL (ref 70–99)
GLUCOSE SERPL-MCNC: 94 MG/DL (ref 70–99)
HCT VFR BLD CALC: 43 % (ref 37–47)
HEMOGLOBIN: 14 GM/DL (ref 12.5–16)
IMMATURE NEUTROPHIL %: 0.3 % (ref 0–0.43)
LYMPHOCYTES ABSOLUTE: 3.5 K/CU MM
LYMPHOCYTES RELATIVE PERCENT: 31.8 % (ref 24–44)
MCH RBC QN AUTO: 28.9 PG (ref 27–31)
MCHC RBC AUTO-ENTMCNC: 32.6 % (ref 32–36)
MCV RBC AUTO: 88.7 FL (ref 78–100)
MONOCYTES ABSOLUTE: 0.9 K/CU MM
MONOCYTES RELATIVE PERCENT: 8.5 % (ref 0–4)
NUCLEATED RBC %: 0 %
PDW BLD-RTO: 13 % (ref 11.7–14.9)
PLATELET # BLD: 181 K/CU MM (ref 140–440)
PMV BLD AUTO: 12.5 FL (ref 7.5–11.1)
POTASSIUM SERPL-SCNC: 4.1 MMOL/L (ref 3.5–5.1)
RBC # BLD: 4.85 M/CU MM (ref 4.2–5.4)
SARS-COV-2 RDRP RESP QL NAA+PROBE: NOT DETECTED
SEGMENTED NEUTROPHILS ABSOLUTE COUNT: 6.2 K/CU MM
SEGMENTED NEUTROPHILS RELATIVE PERCENT: 56.8 % (ref 36–66)
SODIUM BLD-SCNC: 142 MMOL/L (ref 135–145)
SOURCE: NORMAL
TOTAL IMMATURE NEUTOROPHIL: 0.03 K/CU MM
TOTAL NUCLEATED RBC: 0 K/CU MM
TOTAL PROTEIN: 6 GM/DL (ref 6.4–8.2)
WBC # BLD: 10.9 K/CU MM (ref 4–10.5)

## 2023-02-18 PROCEDURE — 2580000003 HC RX 258: Performed by: STUDENT IN AN ORGANIZED HEALTH CARE EDUCATION/TRAINING PROGRAM

## 2023-02-18 PROCEDURE — 6370000000 HC RX 637 (ALT 250 FOR IP): Performed by: PSYCHIATRY & NEUROLOGY

## 2023-02-18 PROCEDURE — 6370000000 HC RX 637 (ALT 250 FOR IP): Performed by: STUDENT IN AN ORGANIZED HEALTH CARE EDUCATION/TRAINING PROGRAM

## 2023-02-18 PROCEDURE — 87635 SARS-COV-2 COVID-19 AMP PRB: CPT

## 2023-02-18 PROCEDURE — 36415 COLL VENOUS BLD VENIPUNCTURE: CPT

## 2023-02-18 PROCEDURE — 6360000002 HC RX W HCPCS: Performed by: STUDENT IN AN ORGANIZED HEALTH CARE EDUCATION/TRAINING PROGRAM

## 2023-02-18 PROCEDURE — 85025 COMPLETE CBC W/AUTO DIFF WBC: CPT

## 2023-02-18 PROCEDURE — 82962 GLUCOSE BLOOD TEST: CPT

## 2023-02-18 PROCEDURE — 80053 COMPREHEN METABOLIC PANEL: CPT

## 2023-02-18 PROCEDURE — G0378 HOSPITAL OBSERVATION PER HR: HCPCS

## 2023-02-18 PROCEDURE — 96372 THER/PROPH/DIAG INJ SC/IM: CPT

## 2023-02-18 PROCEDURE — 94761 N-INVAS EAR/PLS OXIMETRY MLT: CPT

## 2023-02-18 RX ORDER — HYDROXYZINE HYDROCHLORIDE 25 MG/1
25 TABLET, FILM COATED ORAL EVERY 6 HOURS PRN
Qty: 40 TABLET | Refills: 0 | Status: SHIPPED | OUTPATIENT
Start: 2023-02-18 | End: 2023-02-28

## 2023-02-18 RX ORDER — LORAZEPAM 0.5 MG/1
0.5 TABLET ORAL 2 TIMES DAILY PRN
Qty: 14 TABLET | Refills: 0 | Status: SHIPPED | OUTPATIENT
Start: 2023-02-18 | End: 2023-02-25

## 2023-02-18 RX ADMIN — LEVOTHYROXINE SODIUM 50 MCG: 50 TABLET ORAL at 05:46

## 2023-02-18 RX ADMIN — MICONAZOLE NITRATE: 2 POWDER TOPICAL at 08:47

## 2023-02-18 RX ADMIN — Medication 1000 UNITS: at 08:49

## 2023-02-18 RX ADMIN — ENOXAPARIN SODIUM 30 MG: 100 INJECTION SUBCUTANEOUS at 08:48

## 2023-02-18 RX ADMIN — LORAZEPAM 0.5 MG: 0.5 TABLET ORAL at 10:40

## 2023-02-18 RX ADMIN — SODIUM CHLORIDE, PRESERVATIVE FREE 10 ML: 5 INJECTION INTRAVENOUS at 08:48

## 2023-02-18 NOTE — PLAN OF CARE
Problem: Discharge Planning  Goal: Discharge to home or other facility with appropriate resources  2/18/2023 0936 by Cristi Damian RN  Outcome: Completed  2/18/2023 0450 by Caren Villa RN  Outcome: Progressing     Problem: Safety - Adult  Goal: Free from fall injury  2/18/2023 0936 by Cristi Damian RN  Outcome: Completed  2/18/2023 0450 by Caren Villa RN  Outcome: Progressing

## 2023-02-18 NOTE — DISCHARGE INSTRUCTIONS
You will benefit from compression stockings, hydration, smoking cessation products. Follow up with cardiology outpatient for an event monitor.   Take ativan for anxiety as needed

## 2023-02-18 NOTE — PROGRESS NOTES
V2.0  [facility] Hospitalist Progress Note      Name:  Joon Pineda /Age/Sex: 1970  (45 y.o. female)   MRN & CSN:  [id number] & [id number] Encounter Date/Time: 2023 8:15 PM EST    Location:  [address] PCP: Blayne Abdul MD       Hospital Day: 3    Assessment and Plan:   Joon Pineda is a 45 y.o. female with pmh of hypertension, DM 2, neuropathy, RLS, hypothyroidism who presents with syncope with a prodrome of positional LH immediately after she stood up from her couch where she was watching TV. Does not know for certain how long she was out for, does believe it was a few minutes. Reports that she woke up and crawled to the door, yelled out for help however nobody responded that she activated EMS. Was examined in the ED a day prior for chest pain, work-up negative and was discharged home. VSS, labs unremarkable except WBC 11.1. Troponin negative. EKG without acute ischemic changes. Glucose on presentation 94 but 84 later when patient was having symptoms. Hemoglobin A1c 6.2, lipid profile normal.  TSH normal.  Orthostats negative x2. CT head and CTA negative. CXR negative 2 days prior. Plan:    Syncope -w/o negative, suspect secondary to extreme anxiety  Generalized weakness  Vitamin D deficiency -initiated vitamin D supplements  Generalized anxiety disorder  -TTE and CTA head/neck unrevealing  -Cardiology signed off  -Psychiatry assessed patient and recommended Ativan 0.5 mg twice daily  -Maintain telemetry: No rhythm abnormalities noted  -PT/OT, placement to SNF on discharge    Atypical chest pain  Troponin negative, EKG without acute ST-T changes.   Stress test earlier this month negative.  -Cardiology assessed patient and recommended no further work-up after TTE unrevealing    Hypertension -continue home meds  Diabetes mellitus type 2 -hold home metformin, LDSSI, Accu-Cheks, hypoglycemia protocol  Hypothyroidism -TSH normal, continue home Synthroid  Restless leg syndrome -continue ropinirole  Hyperlipidemia -continue statin      Diet ADULT DIET; Regular; 5 carb choices (75 gm/meal)   DVT Prophylaxis [x] Lovenox, []  Heparin, [] SCDs, [] Ambulation,  [] Eliquis, [] Xarelto  [] Coumadin   Code Status Full Code   Disposition From: Home  Expected Disposition: SNF  Estimated Date of Discharge: Tomorrow  Patient requires continued admission due to uncontrolled anxiety   Surrogate Decision Maker/ POA      Subjective:     Chief Complaint: Loss of Consciousness (Pt states she feels lightheaded and passed out when she stood up from the couch )       Patient reports that she continues to feel lightheaded and anxious. Feels that her anxiety is debilitating and possibly causing all her symptoms including the lightheadedness. Reports that she is anxious about everything including her diagnosis of diabetes as well as health and other aspects of her life. When I inquired further about why she is anxious about her diabetes, she reports that she is scared that her sugar will drop thus she has been living the floor of her house open every day since her diagnosis. Review of Systems:    Review of Systems    Negative except as above. Objective: Intake/Output Summary (Last 24 hours) at 2/17/2023 2015  Last data filed at 2/17/2023 1426  Gross per 24 hour   Intake 900 ml   Output --   Net 900 ml        Vitals:   Vitals:    02/17/23 2000   BP: 121/68   Pulse: 72   Resp: 13   Temp: 97.8 °F (36.6 °C)   SpO2: 96%       Physical Exam:     General: NAD  Eyes: EOMI  ENT: neck supple  Cardiovascular: Regular rate. Respiratory: Clear to auscultation  Gastrointestinal: Soft, non tender  Genitourinary: no suprapubic tenderness  Musculoskeletal: No edema  Skin: warm, dry  Neuro: Alert. Psych: Mood appropriate.      Medications:   Medications:    Vitamin D  1,000 Units Oral Daily    nicotine  1 patch TransDERmal Daily    atorvastatin  40 mg Oral Nightly    FLUoxetine  20 mg Oral Daily    levothyroxine 50 mcg Oral Daily    metoprolol succinate  25 mg Oral Daily    rOPINIRole  0.75 mg Oral Nightly    sodium chloride flush  5-40 mL IntraVENous 2 times per day    enoxaparin  30 mg SubCUTAneous BID    insulin lispro  0-4 Units SubCUTAneous TID WC    insulin lispro  0-4 Units SubCUTAneous Nightly    miconazole   Topical BID      Infusions:    sodium chloride      dextrose       PRN Meds: LORazepam, 0.5 mg, BID PRN  aluminum & magnesium hydroxide-simethicone, 5 mL, Q6H PRN  hydrALAZINE, 25 mg, 4x Daily PRN  sodium chloride flush, 5-40 mL, PRN  sodium chloride, , PRN  acetaminophen, 650 mg, Q4H PRN  ondansetron, 4 mg, Q8H PRN   Or  ondansetron, 4 mg, Q6H PRN  glucose, 4 tablet, PRN  dextrose bolus, 125 mL, PRN   Or  dextrose bolus, 250 mL, PRN  glucagon (rDNA), 1 mg, PRN  dextrose, , Continuous PRN  hydrOXYzine HCl, 25 mg, Q6H PRN        Labs      Recent Results (from the past 24 hour(s))   Vitamin B12 & Folate    Collection Time: 02/17/23  2:10 AM   Result Value Ref Range    Vitamin B-12 404.8 211 - 911 pg/ml    Folate 6.7 3.1 - 17.5 NG/ML   Vitamin D 25 Hydroxy    Collection Time: 02/17/23  2:10 AM   Result Value Ref Range    Vit D, 25-Hydroxy 12.64 (L) >20 NG/ML   CBC with Auto Differential    Collection Time: 02/17/23  2:10 AM   Result Value Ref Range    WBC 9.9 4.0 - 10.5 K/CU MM    RBC 4.93 4.2 - 5.4 M/CU MM    Hemoglobin 14.1 12.5 - 16.0 GM/DL    Hematocrit 43.7 37 - 47 %    MCV 88.6 78 - 100 FL    MCH 28.6 27 - 31 PG    MCHC 32.3 32.0 - 36.0 %    RDW 13.0 11.7 - 14.9 %    Platelets 828 387 - 418 K/CU MM    MPV 12.4 (H) 7.5 - 11.1 FL    Differential Type AUTOMATED DIFFERENTIAL     Segs Relative 59.3 36 - 66 %    Lymphocytes % 29.9 24 - 44 %    Monocytes % 7.8 (H) 0 - 4 %    Eosinophils % 2.2 0 - 3 %    Basophils % 0.4 0 - 1 %    Segs Absolute 5.9 K/CU MM    Lymphocytes Absolute 3.0 K/CU MM    Monocytes Absolute 0.8 K/CU MM    Eosinophils Absolute 0.2 K/CU MM    Basophils Absolute 0.0 K/CU MM    Nucleated RBC % 0.0 %    Total Nucleated RBC 0.0 K/CU MM    Total Immature Neutrophil 0.04 K/CU MM    Immature Neutrophil % 0.4 0 - 0.43 %   Comprehensive Metabolic Panel w/ Reflex to MG    Collection Time: 02/17/23  2:10 AM   Result Value Ref Range    Sodium 142 135 - 145 MMOL/L    Potassium 4.1 3.5 - 5.1 MMOL/L    Chloride 107 99 - 110 mMol/L    CO2 25 21 - 32 MMOL/L    BUN 15 6 - 23 MG/DL    Creatinine 0.6 0.6 - 1.1 MG/DL    Est, Glom Filt Rate >60 >60 mL/min/1.73m2    Glucose 99 70 - 99 MG/DL    Calcium 9.5 8.3 - 10.6 MG/DL    Albumin 4.1 3.4 - 5.0 GM/DL    Total Protein 5.9 (L) 6.4 - 8.2 GM/DL    Total Bilirubin 0.6 0.0 - 1.0 MG/DL    ALT 47 (H) 10 - 40 U/L    AST 17 15 - 37 IU/L    Alkaline Phosphatase 66 40 - 128 IU/L    Anion Gap 10 4 - 16   Phosphorus    Collection Time: 02/17/23  2:10 AM   Result Value Ref Range    Phosphorus 4.7 2.5 - 4.9 MG/DL   POCT Glucose    Collection Time: 02/17/23  2:13 AM   Result Value Ref Range    POC Glucose 103 (H) 70 - 99 MG/DL   POCT Glucose    Collection Time: 02/17/23  8:03 AM   Result Value Ref Range    POC Glucose 107 (H) 70 - 99 MG/DL   POCT Glucose    Collection Time: 02/17/23 11:02 AM   Result Value Ref Range    POC Glucose 99 70 - 99 MG/DL   POCT Glucose    Collection Time: 02/17/23  4:06 PM   Result Value Ref Range    POC Glucose 107 (H) 70 - 99 MG/DL        Imaging/Diagnostics Last 24 Hours   CT HEAD WO CONTRAST    Result Date: 2/15/2023  EXAMINATION: CTA OF THE HEAD AND NECK WITH CONTRAST; CT OF THE HEAD WITHOUT CONTRAST 2/15/2023 11:03 pm: TECHNIQUE: CTA of the head and neck was performed with the administration of intravenous contrast. Multiplanar reformatted images are provided for review. MIP images are provided for review. Stenosis of the internal carotid arteries measured using NASCET criteria.  Automated exposure control, iterative reconstruction, and/or weight based adjustment of the mA/kV was utilized to reduce the radiation dose to as low as reasonably achievable.; CT of the head was performed without the administration of intravenous contrast. Automated exposure control, iterative reconstruction, and/or weight based adjustment of the mA/kV was utilized to reduce the radiation dose to as low as reasonably achievable. Noncontrast CT of the head with reconstructed 2-D images are also provided for review. COMPARISON: CT head 06/08/2013. Brain MRI 01/19/2011 HISTORY: ORDERING SYSTEM PROVIDED HISTORY: Lightheaded, head feels funny TECHNOLOGIST PROVIDED HISTORY: Reason for exam:->Lightheaded, head feels funny Has a \"code stroke\" or \"stroke alert\" been called? ->No Decision Support Exception - unselect if not a suspected or confirmed emergency medical condition->Emergency Medical Condition (MA) Reason for Exam: Lightheaded, head feels funny; ORDERING SYSTEM PROVIDED HISTORY: Head feels funny, lightheaded TECHNOLOGIST PROVIDED HISTORY: Has a \"code stroke\" or \"stroke alert\" been called? ->No Reason for exam:->Head feels funny, lightheaded Decision Support Exception - unselect if not a suspected or confirmed emergency medical condition->Emergency Medical Condition (MA) Is the patient pregnant?->No Reason for Exam: Lightheaded, head feels funny FINDINGS: CT HEAD: BRAIN/VENTRICLES:  No acute intracranial hemorrhage or extraaxial fluid collection. Grey-white differentiation is maintained. No evidence of mass, mass effect or midline shift. No evidence of hydrocephalus. Scattered hypoattenuating supratentorial white matter lesions, nonspecific but consistent with mild chronic ischemic white matter changes. Left lateral ventricle is mildly larger than the right, similar to prior. ORBITS: The visualized portion of the orbits demonstrate no acute abnormality. SINUSES:  The visualized paranasal sinuses and mastoid air cells demonstrate no acute abnormality. SOFT TISSUES/SKULL: No acute abnormality of the visualized skull or soft tissues.  CTA NECK: AORTIC ARCH/ARCH VESSELS: No dissection or arterial injury. No significant stenosis of the brachiocephalic or subclavian arteries. CAROTID ARTERIES: No dissection, arterial injury, or hemodynamically significant stenosis by NASCET criteria. Retropharyngeal course of both cervical ICAs. VERTEBRAL ARTERIES: No dissection, arterial injury, or significant stenosis. SOFT TISSUES: The lung apices are clear. No cervical or superior mediastinal lymphadenopathy. The larynx and pharynx are unremarkable. No acute abnormality of the salivary and thyroid glands. BONES: No acute osseous abnormality. Disc osteophyte complex results in moderate spinal canal narrowing at C5-C6. CTA HEAD: ANTERIOR CIRCULATION: No significant stenosis of the intracranial internal carotid, anterior cerebral, or middle cerebral arteries. No aneurysm. POSTERIOR CIRCULATION: No significant stenosis of the vertebral, basilar, or posterior cerebral arteries. No aneurysm. OTHER: No dural venous sinus thrombosis on this non-dedicated study. 1. No acute intracranial abnormality. 2. No significant stenosis, aneurysm, or arterial injury CTA of the head and neck. CTA HEAD NECK W CONTRAST    Result Date: 2/15/2023  EXAMINATION: CTA OF THE HEAD AND NECK WITH CONTRAST; CT OF THE HEAD WITHOUT CONTRAST 2/15/2023 11:03 pm: TECHNIQUE: CTA of the head and neck was performed with the administration of intravenous contrast. Multiplanar reformatted images are provided for review. MIP images are provided for review. Stenosis of the internal carotid arteries measured using NASCET criteria.  Automated exposure control, iterative reconstruction, and/or weight based adjustment of the mA/kV was utilized to reduce the radiation dose to as low as reasonably achievable.; CT of the head was performed without the administration of intravenous contrast. Automated exposure control, iterative reconstruction, and/or weight based adjustment of the mA/kV was utilized to reduce the radiation dose to as low as reasonably achievable. Noncontrast CT of the head with reconstructed 2-D images are also provided for review. COMPARISON: CT head 06/08/2013. Brain MRI 01/19/2011 HISTORY: ORDERING SYSTEM PROVIDED HISTORY: Lightheaded, head feels funny TECHNOLOGIST PROVIDED HISTORY: Reason for exam:->Lightheaded, head feels funny Has a \"code stroke\" or \"stroke alert\" been called? ->No Decision Support Exception - unselect if not a suspected or confirmed emergency medical condition->Emergency Medical Condition (MA) Reason for Exam: Lightheaded, head feels funny; ORDERING SYSTEM PROVIDED HISTORY: Head feels funny, lightheaded TECHNOLOGIST PROVIDED HISTORY: Has a \"code stroke\" or \"stroke alert\" been called? ->No Reason for exam:->Head feels funny, lightheaded Decision Support Exception - unselect if not a suspected or confirmed emergency medical condition->Emergency Medical Condition (MA) Is the patient pregnant?->No Reason for Exam: Lightheaded, head feels jacques FINDINGS: CT HEAD: BRAIN/VENTRICLES:  No acute intracranial hemorrhage or extraaxial fluid collection. Grey-white differentiation is maintained. No evidence of mass, mass effect or midline shift. No evidence of hydrocephalus. Scattered hypoattenuating supratentorial white matter lesions, nonspecific but consistent with mild chronic ischemic white matter changes. Left lateral ventricle is mildly larger than the right, similar to prior. ORBITS: The visualized portion of the orbits demonstrate no acute abnormality. SINUSES:  The visualized paranasal sinuses and mastoid air cells demonstrate no acute abnormality. SOFT TISSUES/SKULL: No acute abnormality of the visualized skull or soft tissues. CTA NECK: AORTIC ARCH/ARCH VESSELS: No dissection or arterial injury. No significant stenosis of the brachiocephalic or subclavian arteries. CAROTID ARTERIES: No dissection, arterial injury, or hemodynamically significant stenosis by NASCET criteria. Retropharyngeal course of both cervical ICAs. VERTEBRAL ARTERIES: No dissection, arterial injury, or significant stenosis. SOFT TISSUES: The lung apices are clear. No cervical or superior mediastinal lymphadenopathy. The larynx and pharynx are unremarkable. No acute abnormality of the salivary and thyroid glands. BONES: No acute osseous abnormality. Disc osteophyte complex results in moderate spinal canal narrowing at C5-C6. CTA HEAD: ANTERIOR CIRCULATION: No significant stenosis of the intracranial internal carotid, anterior cerebral, or middle cerebral arteries. No aneurysm. POSTERIOR CIRCULATION: No significant stenosis of the vertebral, basilar, or posterior cerebral arteries. No aneurysm. OTHER: No dural venous sinus thrombosis on this non-dedicated study. 1. No acute intracranial abnormality. 2. No significant stenosis, aneurysm, or arterial injury CTA of the head and neck.        Electronically signed by Marguerite Henry MD on 2/17/2023 at 8:15 PM

## 2023-02-18 NOTE — DISCHARGE SUMMARY
V2.0  Discharge Summary    Name:  Pb Piper /Age/Sex: 1970 (45 y.o. female)   Admit Date: 2/15/2023  Discharge Date: 23    MRN & CSN:  [id number] & [id number] Encounter Date and Time 23 12:45 PM EST    Attending:  Hema Marie MD Discharging Provider: Hema Marie MD       Hospital Course:     Brief HPI: Pb Piper is a 45 y.o. female with pmh of hypertension, DM 2, neuropathy, RLS, hypothyroidism who presents with syncope with a prodrome of positional LH immediately after she stood up from her couch where she was watching TV. Does not know for certain how long she was out for, does believe it was a few minutes. Reports that she woke up and crawled to the door, yelled out for help however nobody responded that she activated EMS. Was examined in the ED a day prior for chest pain, work-up negative and was discharged home. VSS, labs unremarkable except WBC 11.1. Troponin negative. EKG without acute ischemic changes. Glucose on presentation 94 but 84 later when patient was having symptoms. Hemoglobin A1c 6.2, lipid profile normal.  TSH normal.  Orthostats negative x2. CT head and CTA negative. CXR negative 2 days prior.         Brief Problem Based Course:     Syncope -w/o negative, suspect secondary to combination of transient hypotension and extreme anxiety  Generalized weakness  Vitamin D deficiency -initiated vitamin D supplements  Generalized anxiety disorder -significantly improved at discharge  -TTE and CTA head/neck unrevealing  -No rhythm abnormalities noted on telemetry however HR was consistently lower limit of normal without metoprolol, discontinued metoprolol  -Psychiatry assessed patient and recommended Ativan 0.5 mg BID prn  -Compression stockings, hydration, smoking cessation, anxiety control  -SNF on discharge, continue PT/OT  -F/u with cardiology outpatient for heart monitor  -LH resolved and anxiety well controlled with medication changes at discharge     Atypical chest pain -self resolved, likely anxiety related  Troponin negative, EKG without acute ST-T changes. Stress test earlier this month negative.  -Cardiology assessed patient and recommended no further work-up after TTE unrevealing  -Treat anxiety as above     Hypertension -stopped metoprolol in the setting of soft BP with LH and syncope as well as HR low 60s  Diabetes mellitus type 2 -stopped home metformin as pt's glucose borderline without this, reassess outpatient and reinitiate as appropriate  Hypothyroidism -TSH normal, continue home Synthroid  Restless leg syndrome -continue ropinirole  Hyperlipidemia -continue statin    Tobacco abuse -counseled on cessation, continue nicotine patches      The patient expressed appropriate understanding of, and agreement with the discharge recommendations, medications, and plan. Consults this admission:  IP CONSULT TO CARDIOLOGY  IP CONSULT TO PSYCHIATRY    Discharge Diagnosis:   Syncope and collapse    Discharge Instruction:     Follow up appointments: PCP, cardiology  Primary care physician: Lj Bynum MD within 2 weeks  Diet: diabetic diet   Activity: activity as tolerated, fall precautions  Disposition: Discharged to:   []Home, []C, [x]SNF, []Acute Rehab, []Hospice  Condition on discharge: Stable  Labs and Tests to be Followed up as an outpatient by PCP or Specialist:     Discharge Medications:        Medication List        START taking these medications      LORazepam 0.5 MG tablet  Commonly known as: ATIVAN  Take 1 tablet by mouth 2 times daily as needed for Anxiety for up to 7 days.  Max Daily Amount: 1 mg     nicotine 7 MG/24HR  Commonly known as: NICODERM CQ  Place 1 patch onto the skin daily            CONTINUE taking these medications      atorvastatin 40 MG tablet  Commonly known as: LIPITOR  Take 1 tablet by mouth nightly     * FLUoxetine 40 MG capsule  Commonly known as: PROzac     * FLUoxetine 20 MG capsule  Commonly known as: PROZAC      Sterile Alcohol Prep Pads     hydrALAZINE 25 MG tablet  Commonly known as: APRESOLINE  Notes to patient: Give if SBP>180     levothyroxine 50 MCG tablet  Commonly known as: SYNTHROID     meloxicam 15 MG tablet  Commonly known as: MOBIC  Notes to patient: Take 1 daily     OneTouch Delica Lancets 33G Misc     OneTouch Verio Flex System w/Device Kit     OneTouch Verio strip  Generic drug: blood glucose test strips     rOPINIRole 0.25 MG tablet  Commonly known as: REQUIP  TAKE 3 TABLETS BY MOUTH 2 HOURS BEFORE BED     vitamin D3 25 MCG (1000 UT) Tabs tablet  Commonly known as: CHOLECALCIFEROL           * This list has 2 medication(s) that are the same as other medications prescribed for you. Read the directions carefully, and ask your doctor or other care provider to review them with you.                STOP taking these medications      cyclobenzaprine 10 MG tablet  Commonly known as: FLEXERIL     metFORMIN 500 MG tablet  Commonly known as: GLUCOPHAGE     metoprolol succinate 25 MG extended release tablet  Commonly known as: TOPROL XL            ASK your doctor about these medications      aluminum & magnesium hydroxide-simethicone 200-200-20 MG/5ML Susp suspension  Commonly known as: MAALOX  Take 5 mLs by mouth every 6 hours as needed for Indigestion     gabapentin 300 MG capsule  Commonly known as: NEURONTIN     omeprazole 20 MG delayed release capsule  Commonly known as: PRILOSEC  Take 2 capsules by mouth Daily     Spiriva Respimat 2.5 MCG/ACT Aers inhaler  Generic drug: tiotropium               Where to Get Your Medications        These medications were sent to [place], [place] - [address] [facility] [phone] - F [phone]  [address] [facility] [address]      Phone: [phone]   nicotine 7 MG/24HR       You can get these medications from any pharmacy    Bring a paper prescription for each of these medications  LORazepam 0.5 MG tablet        Objective  Findings at Discharge:   /61   Pulse 56   Temp 97.6 °F (36.4 °C) (Oral)   Resp 16   Ht 5' 3\" (1.6 m)   Wt 242 lb (109.8 kg)   SpO2 98%   BMI 42.87 kg/m²       Physical Exam:     General: NAD  Eyes: EOMI  ENT: neck supple  Cardiovascular: Regular rate. Respiratory: Clear to auscultation  Gastrointestinal: Soft, non tender  Genitourinary: no suprapubic tenderness  Musculoskeletal: No edema  Skin: warm, dry  Neuro: Alert. Psych: Mood appropriate        Labs and Imaging   CT HEAD WO CONTRAST    Result Date: 2/15/2023  EXAMINATION: CTA OF THE HEAD AND NECK WITH CONTRAST; CT OF THE HEAD WITHOUT CONTRAST 2/15/2023 11:03 pm: TECHNIQUE: CTA of the head and neck was performed with the administration of intravenous contrast. Multiplanar reformatted images are provided for review. MIP images are provided for review. Stenosis of the internal carotid arteries measured using NASCET criteria. Automated exposure control, iterative reconstruction, and/or weight based adjustment of the mA/kV was utilized to reduce the radiation dose to as low as reasonably achievable.; CT of the head was performed without the administration of intravenous contrast. Automated exposure control, iterative reconstruction, and/or weight based adjustment of the mA/kV was utilized to reduce the radiation dose to as low as reasonably achievable. Noncontrast CT of the head with reconstructed 2-D images are also provided for review. COMPARISON: CT head 06/08/2013. Brain MRI 01/19/2011 HISTORY: ORDERING SYSTEM PROVIDED HISTORY: Lightheaded, head feels funny TECHNOLOGIST PROVIDED HISTORY: Reason for exam:->Lightheaded, head feels funny Has a \"code stroke\" or \"stroke alert\" been called? ->No Decision Support Exception - unselect if not a suspected or confirmed emergency medical condition->Emergency Medical Condition (MA) Reason for Exam: Lightheaded, head feels funny; ORDERING SYSTEM PROVIDED HISTORY: Head feels funny, lightheaded TECHNOLOGIST PROVIDED HISTORY: Has a \"code stroke\" or \"stroke alert\" been called? ->No Reason for exam:->Head feels funny, lightheaded Decision Support Exception - unselect if not a suspected or confirmed emergency medical condition->Emergency Medical Condition (MA) Is the patient pregnant?->No Reason for Exam: Lightheaded, head feels funny FINDINGS: CT HEAD: BRAIN/VENTRICLES:  No acute intracranial hemorrhage or extraaxial fluid collection. Grey-white differentiation is maintained. No evidence of mass, mass effect or midline shift. No evidence of hydrocephalus. Scattered hypoattenuating supratentorial white matter lesions, nonspecific but consistent with mild chronic ischemic white matter changes. Left lateral ventricle is mildly larger than the right, similar to prior. ORBITS: The visualized portion of the orbits demonstrate no acute abnormality. SINUSES:  The visualized paranasal sinuses and mastoid air cells demonstrate no acute abnormality. SOFT TISSUES/SKULL: No acute abnormality of the visualized skull or soft tissues. CTA NECK: AORTIC ARCH/ARCH VESSELS: No dissection or arterial injury. No significant stenosis of the brachiocephalic or subclavian arteries. CAROTID ARTERIES: No dissection, arterial injury, or hemodynamically significant stenosis by NASCET criteria. Retropharyngeal course of both cervical ICAs. VERTEBRAL ARTERIES: No dissection, arterial injury, or significant stenosis. SOFT TISSUES: The lung apices are clear. No cervical or superior mediastinal lymphadenopathy. The larynx and pharynx are unremarkable. No acute abnormality of the salivary and thyroid glands. BONES: No acute osseous abnormality. Disc osteophyte complex results in moderate spinal canal narrowing at C5-C6. CTA HEAD: ANTERIOR CIRCULATION: No significant stenosis of the intracranial internal carotid, anterior cerebral, or middle cerebral arteries. No aneurysm.  POSTERIOR CIRCULATION: No significant stenosis of the vertebral, basilar, or posterior cerebral arteries. No aneurysm. OTHER: No dural venous sinus thrombosis on this non-dedicated study. 1. No acute intracranial abnormality. 2. No significant stenosis, aneurysm, or arterial injury CTA of the head and neck. XR CHEST PORTABLE    Result Date: 2/14/2023  EXAMINATION: ONE XRAY VIEW OF THE CHEST 2/14/2023 3:49 pm COMPARISON: 02/07/2023 HISTORY: ORDERING SYSTEM PROVIDED HISTORY: chest pain TECHNOLOGIST PROVIDED HISTORY: Reason for exam:->chest pain Reason for Exam: chest pain FINDINGS: No lung infiltrate or consolidation. No pneumothorax or pleural effusion. Heart size is normal.     No acute abnormality. CTA HEAD NECK W CONTRAST    Result Date: 2/15/2023  EXAMINATION: CTA OF THE HEAD AND NECK WITH CONTRAST; CT OF THE HEAD WITHOUT CONTRAST 2/15/2023 11:03 pm: TECHNIQUE: CTA of the head and neck was performed with the administration of intravenous contrast. Multiplanar reformatted images are provided for review. MIP images are provided for review. Stenosis of the internal carotid arteries measured using NASCET criteria. Automated exposure control, iterative reconstruction, and/or weight based adjustment of the mA/kV was utilized to reduce the radiation dose to as low as reasonably achievable.; CT of the head was performed without the administration of intravenous contrast. Automated exposure control, iterative reconstruction, and/or weight based adjustment of the mA/kV was utilized to reduce the radiation dose to as low as reasonably achievable. Noncontrast CT of the head with reconstructed 2-D images are also provided for review. COMPARISON: CT head 06/08/2013. Brain MRI 01/19/2011 HISTORY: ORDERING SYSTEM PROVIDED HISTORY: Lightheaded, head feels funny TECHNOLOGIST PROVIDED HISTORY: Reason for exam:->Lightheaded, head feels funny Has a \"code stroke\" or \"stroke alert\" been called? ->No Decision Support Exception - unselect if not a suspected or confirmed emergency medical condition->Emergency Medical Condition (MA) Reason for Exam: Lightheaded, head feels funny; ORDERING SYSTEM PROVIDED HISTORY: Head feels funny, lightheaded TECHNOLOGIST PROVIDED HISTORY: Has a \"code stroke\" or \"stroke alert\" been called? ->No Reason for exam:->Head feels funny, lightheaded Decision Support Exception - unselect if not a suspected or confirmed emergency medical condition->Emergency Medical Condition (MA) Is the patient pregnant?->No Reason for Exam: Lightheaded, head feels funny FINDINGS: CT HEAD: BRAIN/VENTRICLES:  No acute intracranial hemorrhage or extraaxial fluid collection. Grey-white differentiation is maintained. No evidence of mass, mass effect or midline shift. No evidence of hydrocephalus. Scattered hypoattenuating supratentorial white matter lesions, nonspecific but consistent with mild chronic ischemic white matter changes. Left lateral ventricle is mildly larger than the right, similar to prior. ORBITS: The visualized portion of the orbits demonstrate no acute abnormality. SINUSES:  The visualized paranasal sinuses and mastoid air cells demonstrate no acute abnormality. SOFT TISSUES/SKULL: No acute abnormality of the visualized skull or soft tissues. CTA NECK: AORTIC ARCH/ARCH VESSELS: No dissection or arterial injury. No significant stenosis of the brachiocephalic or subclavian arteries. CAROTID ARTERIES: No dissection, arterial injury, or hemodynamically significant stenosis by NASCET criteria. Retropharyngeal course of both cervical ICAs. VERTEBRAL ARTERIES: No dissection, arterial injury, or significant stenosis. SOFT TISSUES: The lung apices are clear. No cervical or superior mediastinal lymphadenopathy. The larynx and pharynx are unremarkable. No acute abnormality of the salivary and thyroid glands. BONES: No acute osseous abnormality. Disc osteophyte complex results in moderate spinal canal narrowing at C5-C6.  CTA HEAD: ANTERIOR CIRCULATION: No significant stenosis of the intracranial internal carotid, anterior cerebral, or middle cerebral arteries. No aneurysm. POSTERIOR CIRCULATION: No significant stenosis of the vertebral, basilar, or posterior cerebral arteries. No aneurysm. OTHER: No dural venous sinus thrombosis on this non-dedicated study. 1. No acute intracranial abnormality. 2. No significant stenosis, aneurysm, or arterial injury CTA of the head and neck. CBC:   Recent Labs     02/15/23  2022 02/17/23  0210 02/18/23  0100   WBC 11.1* 9.9 10.9*   HGB 14.2 14.1 14.0    163 181     BMP:    Recent Labs     02/15/23  2022 02/17/23  0210 02/18/23  0100    142 142   K 3.7 4.1 4.1    107 105   CO2 23 25 27   BUN 25* 15 15   CREATININE 0.7 0.6 0.7   GLUCOSE 93 99 94     Hepatic:   Recent Labs     02/15/23  2022 02/17/23  0210 02/18/23  0100   AST 22 17 23   ALT 54* 47* 53*   BILITOT 0.6 0.6 0.6   ALKPHOS 70 66 71     Lipids:   Lab Results   Component Value Date/Time    CHOL 94 02/16/2023 01:00 AM    HDL 41 02/16/2023 01:00 AM    TRIG 112 02/16/2023 01:00 AM     Hemoglobin A1C:   Lab Results   Component Value Date/Time    LABA1C 6.2 02/15/2023 08:22 PM     TSH: No results found for: TSH  Troponin:   Lab Results   Component Value Date/Time    TROPONINT <0.010 02/16/2023 02:41 PM    TROPONINT <0.010 02/15/2023 08:22 PM    TROPONINT <0.010 02/14/2023 07:57 PM     Lactic Acid: No results for input(s): LACTA in the last 72 hours. BNP: No results for input(s): PROBNP in the last 72 hours.   UA:  Lab Results   Component Value Date/Time    NITRU NEGATIVE 01/29/2023 06:43 PM    NITRU NEGATIVE 06/08/2013 06:40 PM    COLORU YELLOW 01/29/2023 06:43 PM    WBCUA 3 01/29/2023 06:43 PM    RBCUA NONE SEEN 01/29/2023 06:43 PM    MUCUS RARE 02/16/2022 10:15 AM    TRICHOMONAS NONE SEEN 01/29/2023 06:43 PM    BACTERIA NEGATIVE 01/29/2023 06:43 PM    CLARITYU CLEAR 01/29/2023 06:43 PM    SPECGRAV 1.010 01/29/2023 06:43 PM    LEUKOCYTESUR NEGATIVE 01/29/2023 06:43 PM UROBILINOGEN 0.2 01/29/2023 06:43 PM    BILIRUBINUR NEGATIVE 01/29/2023 06:43 PM    BLOODU TRACE 01/29/2023 06:43 PM    GLUCOSEU neg 06/08/2013 06:40 PM    GLUCOSEU NEGATIVE 06/08/2013 06:40 PM    KETUA NEGATIVE 01/29/2023 06:43 PM     Urine Cultures: No results found for: Clary Pattee  Blood Cultures: No results found for: BC  No results found for: BLOODCULT2  Organism:   Lab Results   Component Value Date/Time    [facility] MRSA 03/04/2013 12:45 AM       Time Spent Discharging patient 35 minutes    Electronically signed by Arline Boyd MD on 2/18/2023 at 12:45 PM

## 2023-02-20 ENCOUNTER — TELEPHONE (OUTPATIENT)
Dept: CARDIOLOGY CLINIC | Age: 53
End: 2023-02-20

## 2023-02-21 ENCOUNTER — HOSPITAL ENCOUNTER (OUTPATIENT)
Age: 53
Setting detail: SPECIMEN
Discharge: HOME | End: 2023-02-21

## 2023-02-21 LAB
ALBUMIN SERPL-MCNC: 4.2 GM/DL (ref 3.4–5)
ALP BLD-CCNC: 69 IU/L (ref 40–129)
ALT SERPL-CCNC: 58 U/L (ref 10–40)
ANION GAP SERPL CALCULATED.3IONS-SCNC: 9 MMOL/L (ref 4–16)
AST SERPL-CCNC: 24 IU/L (ref 15–37)
BASOPHILS ABSOLUTE: 0.1 K/CU MM
BASOPHILS RELATIVE PERCENT: 0.6 % (ref 0–1)
BILIRUB SERPL-MCNC: 0.7 MG/DL (ref 0–1)
BUN SERPL-MCNC: 14 MG/DL (ref 6–23)
CALCIUM SERPL-MCNC: 9.3 MG/DL (ref 8.3–10.6)
CHLORIDE BLD-SCNC: 105 MMOL/L (ref 99–110)
CO2: 29 MMOL/L (ref 21–32)
CREAT SERPL-MCNC: 0.7 MG/DL (ref 0.6–1.1)
DIFFERENTIAL TYPE: ABNORMAL
EOSINOPHILS ABSOLUTE: 0.2 K/CU MM
EOSINOPHILS RELATIVE PERCENT: 2.5 % (ref 0–3)
GFR SERPL CREATININE-BSD FRML MDRD: >60 ML/MIN/1.73M2
GLUCOSE SERPL-MCNC: 82 MG/DL (ref 70–99)
HCT VFR BLD CALC: 42.5 % (ref 37–47)
HEMOGLOBIN: 13.8 GM/DL (ref 12.5–16)
IMMATURE NEUTROPHIL %: 0.5 % (ref 0–0.43)
LYMPHOCYTES ABSOLUTE: 2.5 K/CU MM
LYMPHOCYTES RELATIVE PERCENT: 28.1 % (ref 24–44)
MCH RBC QN AUTO: 29.1 PG (ref 27–31)
MCHC RBC AUTO-ENTMCNC: 32.5 % (ref 32–36)
MCV RBC AUTO: 89.7 FL (ref 78–100)
MONOCYTES ABSOLUTE: 0.8 K/CU MM
MONOCYTES RELATIVE PERCENT: 9.1 % (ref 0–4)
NUCLEATED RBC %: 0 %
PDW BLD-RTO: 13.2 % (ref 11.7–14.9)
PLATELET # BLD: 160 K/CU MM (ref 140–440)
PMV BLD AUTO: 12.1 FL (ref 7.5–11.1)
POTASSIUM SERPL-SCNC: 4.3 MMOL/L (ref 3.5–5.1)
RBC # BLD: 4.74 M/CU MM (ref 4.2–5.4)
SEGMENTED NEUTROPHILS ABSOLUTE COUNT: 5.2 K/CU MM
SEGMENTED NEUTROPHILS RELATIVE PERCENT: 59.2 % (ref 36–66)
SODIUM BLD-SCNC: 143 MMOL/L (ref 135–145)
TOTAL IMMATURE NEUTOROPHIL: 0.04 K/CU MM
TOTAL NUCLEATED RBC: 0 K/CU MM
TOTAL PROTEIN: 6.1 GM/DL (ref 6.4–8.2)
WBC # BLD: 8.7 K/CU MM (ref 4–10.5)

## 2023-02-21 PROCEDURE — 85025 COMPLETE CBC W/AUTO DIFF WBC: CPT

## 2023-02-21 PROCEDURE — 36415 COLL VENOUS BLD VENIPUNCTURE: CPT

## 2023-02-21 PROCEDURE — 80053 COMPREHEN METABOLIC PANEL: CPT

## 2023-02-24 ENCOUNTER — TELEPHONE (OUTPATIENT)
Dept: GASTROENTEROLOGY | Age: 53
End: 2023-02-24

## 2023-02-24 NOTE — TELEPHONE ENCOUNTER
I called and spoke with the patient regarding making a new patient appt with Dr. Trenton [place] due to the hospital referring her for possible gastritis or esophagitis as a source of her chest pain. Patient reported that she is currently in [address] [place] [address] and she will call to make the appt once she is released to go back home.

## 2023-03-06 ENCOUNTER — APPOINTMENT (OUTPATIENT)
Dept: GENERAL RADIOLOGY | Age: 53
End: 2023-03-06
Payer: MEDICARE

## 2023-03-06 ENCOUNTER — HOSPITAL ENCOUNTER (EMERGENCY)
Age: 53
Discharge: HOME | End: 2023-03-06
Attending: EMERGENCY MEDICINE
Payer: MEDICARE

## 2023-03-06 VITALS
OXYGEN SATURATION: 96 % | RESPIRATION RATE: 19 BRPM | HEIGHT: 63 IN | DIASTOLIC BLOOD PRESSURE: 58 MMHG | TEMPERATURE: 96.7 F | BODY MASS INDEX: 42.52 KG/M2 | WEIGHT: 240 LBS | HEART RATE: 73 BPM | SYSTOLIC BLOOD PRESSURE: 122 MMHG

## 2023-03-06 DIAGNOSIS — R07.9 CHEST PAIN, UNSPECIFIED TYPE: Primary | ICD-10-CM

## 2023-03-06 LAB
ALBUMIN SERPL-MCNC: 4.4 GM/DL (ref 3.4–5)
ALP BLD-CCNC: 70 IU/L (ref 40–129)
ALT SERPL-CCNC: 59 U/L (ref 10–40)
ANION GAP SERPL CALCULATED.3IONS-SCNC: 12 MMOL/L (ref 4–16)
AST SERPL-CCNC: 26 IU/L (ref 15–37)
BASOPHILS ABSOLUTE: 0.1 K/CU MM
BASOPHILS RELATIVE PERCENT: 0.6 % (ref 0–1)
BILIRUB SERPL-MCNC: 0.7 MG/DL (ref 0–1)
BUN SERPL-MCNC: 24 MG/DL (ref 6–23)
CALCIUM SERPL-MCNC: 9.7 MG/DL (ref 8.3–10.6)
CHLORIDE BLD-SCNC: 104 MMOL/L (ref 99–110)
CO2: 26 MMOL/L (ref 21–32)
CREAT SERPL-MCNC: 0.6 MG/DL (ref 0.6–1.1)
DIFFERENTIAL TYPE: ABNORMAL
EOSINOPHILS ABSOLUTE: 0.2 K/CU MM
EOSINOPHILS RELATIVE PERCENT: 2.1 % (ref 0–3)
GFR SERPL CREATININE-BSD FRML MDRD: >60 ML/MIN/1.73M2
GLUCOSE SERPL-MCNC: 114 MG/DL (ref 70–99)
HCT VFR BLD CALC: 43.9 % (ref 37–47)
HEMOGLOBIN: 14.8 GM/DL (ref 12.5–16)
IMMATURE NEUTROPHIL %: 0.6 % (ref 0–0.43)
LYMPHOCYTES ABSOLUTE: 1.7 K/CU MM
LYMPHOCYTES RELATIVE PERCENT: 18.4 % (ref 24–44)
MAGNESIUM: 2 MG/DL (ref 1.8–2.4)
MCH RBC QN AUTO: 29.2 PG (ref 27–31)
MCHC RBC AUTO-ENTMCNC: 33.7 % (ref 32–36)
MCV RBC AUTO: 86.8 FL (ref 78–100)
MONOCYTES ABSOLUTE: 0.9 K/CU MM
MONOCYTES RELATIVE PERCENT: 9.8 % (ref 0–4)
NUCLEATED RBC %: 0 %
PDW BLD-RTO: 13.2 % (ref 11.7–14.9)
PLATELET # BLD: 192 K/CU MM (ref 140–440)
PMV BLD AUTO: 11.7 FL (ref 7.5–11.1)
POTASSIUM SERPL-SCNC: 4 MMOL/L (ref 3.5–5.1)
RBC # BLD: 5.06 M/CU MM (ref 4.2–5.4)
SEGMENTED NEUTROPHILS ABSOLUTE COUNT: 6.2 K/CU MM
SEGMENTED NEUTROPHILS RELATIVE PERCENT: 68.5 % (ref 36–66)
SODIUM BLD-SCNC: 142 MMOL/L (ref 135–145)
TOTAL IMMATURE NEUTOROPHIL: 0.05 K/CU MM
TOTAL NUCLEATED RBC: 0 K/CU MM
TOTAL PROTEIN: 6.9 GM/DL (ref 6.4–8.2)
TROPONIN T: <0.01 NG/ML
TROPONIN T: <0.01 NG/ML
WBC # BLD: 9 K/CU MM (ref 4–10.5)

## 2023-03-06 PROCEDURE — 99285 EMERGENCY DEPT VISIT HI MDM: CPT

## 2023-03-06 PROCEDURE — 80053 COMPREHEN METABOLIC PANEL: CPT

## 2023-03-06 PROCEDURE — 83735 ASSAY OF MAGNESIUM: CPT

## 2023-03-06 PROCEDURE — 6370000000 HC RX 637 (ALT 250 FOR IP): Performed by: EMERGENCY MEDICINE

## 2023-03-06 PROCEDURE — 84484 ASSAY OF TROPONIN QUANT: CPT

## 2023-03-06 PROCEDURE — 71045 X-RAY EXAM CHEST 1 VIEW: CPT

## 2023-03-06 PROCEDURE — 85025 COMPLETE CBC W/AUTO DIFF WBC: CPT

## 2023-03-06 PROCEDURE — 93005 ELECTROCARDIOGRAM TRACING: CPT | Performed by: EMERGENCY MEDICINE

## 2023-03-06 RX ORDER — MAGNESIUM HYDROXIDE/ALUMINUM HYDROXICE/SIMETHICONE 120; 1200; 1200 MG/30ML; MG/30ML; MG/30ML
30 SUSPENSION ORAL ONCE
Status: COMPLETED | OUTPATIENT
Start: 2023-03-06 | End: 2023-03-06

## 2023-03-06 RX ORDER — FAMOTIDINE 20 MG/1
20 TABLET, FILM COATED ORAL ONCE
Status: COMPLETED | OUTPATIENT
Start: 2023-03-06 | End: 2023-03-06

## 2023-03-06 RX ADMIN — ALUMINUM HYDROXIDE, MAGNESIUM HYDROXIDE, AND SIMETHICONE 30 ML: 200; 200; 20 SUSPENSION ORAL at 18:27

## 2023-03-06 RX ADMIN — FAMOTIDINE 20 MG: 20 TABLET, FILM COATED ORAL at 18:27

## 2023-03-06 ASSESSMENT — HEART SCORE: ECG: 0

## 2023-03-06 NOTE — ED PROVIDER NOTES
[address] ENCOUNTER      Pt Name: Rachel Freeman  MRN: [id number]  [place] 1970  Date of evaluation: 3/6/2023  Provider: Kelly Schafer MD  Insurance:  Payor: [facility] MEDICARE / Plan: Jaime Nipple COMPLETE / Product Type: *No Product type* /   Current Code Status   Code Status: Prior     CHIEF COMPLAINT       Chief Complaint   Patient presents with    Chest Pain    Loss of Consciousness         HISTORY OF PRESENT ILLNESS    HPI    Nursing Notes were reviewed. This is a 52 y.o. female who presents to the emergency department with left-sided chest pain for the approximately 2 to 3 hours. Patient says she been having worsening generalized malaise and weakness over the past 1 to 2 days she says that her breathing appears to be worse when she is at home and improves when she leaves the house. Patient says she was recently discharged from a nursing home and lives in a home where she has concerns about her living conditions. She says she has been sleeping in her car because she is not comfortable sleeping in the house. She says she has been having left-sided chest pain for the past 2 hours. She describes nausea but no vomiting. She denies diarrhea. She denies abdominal pain. She denies new cough. The patient says that she thinks that her living conditions of possible \"mold \"all affecting her brain and as a result, she says that she had a syncopal episode earlier today. The patient says that she passed out at home. She says that she suddenly felt tired and sat down and then woke up approximately 5 to 10 minutes later. After the episode of syncope, the patient says that she called 911 and the ambulance brought her to the emergency department.     PAST MEDICAL HISTORY     Past Medical History:   Diagnosis Date    Anxiety     COPD (chronic obstructive pulmonary disease) (HCC)     Hypoglycemia     history Panic attacks     Primary osteoarthritis of right knee     Separation anxiety     TIA (transient ischemic attack)          SURGICAL HISTORY       Past Surgical History:   Procedure Laterality Date     SECTION      CHOLECYSTECTOMY      HYSTERECTOMY (CERVIX STATUS UNKNOWN)      KNEE CARTILAGE SURGERY      R knee     OVARY REMOVAL      TOTAL KNEE ARTHROPLASTY Right 2022    RIGHT KNEE TOTAL ARTHROPLASTY ROBOTIC performed by Leila Acuna MD at [address] [place] [address]       Discharge Medication List as of 3/6/2023  9:04 PM        CONTINUE these medications which have NOT CHANGED    Details   nicotine (NICODERM CQ) 7 MG/24HR Place 1 patch onto the skin daily, Disp-14 patch, R-0Normal      omeprazole (PRILOSEC) 20 MG delayed release capsule Take 2 capsules by mouth Daily, Disp-60 capsule, R-0Normal      aluminum & magnesium hydroxide-simethicone (MAALOX) 200-200-20 MG/5ML SUSP suspension Take 5 mLs by mouth every 6 hours as needed for Indigestion, Disp-355 mL, R-0Normal      atorvastatin (LIPITOR) 40 MG tablet Take 1 tablet by mouth nightly, Disp-30 tablet, R-3Normal      hydrALAZINE (APRESOLINE) 25 MG tablet Take 25 mg by mouth 4 times daily as neededHistorical Med      !! FLUoxetine (PROZAC) 20 MG capsule Take 1 capsule by mouth dailyHistorical Med      rOPINIRole (REQUIP) 0.25 MG tablet TAKE 3 TABLETS BY MOUTH 2 HOURS BEFORE BED, Disp-90 tablet, R-1Normal      gabapentin (NEURONTIN) 300 MG capsule 300 mg at bedtime. Historical Med      !!  FLUoxetine (PROZAC) 40 MG capsule at bedtime Historical Med      ONETOUCH VERIO strip test 2 - 3 times a day by Transdermal route as directed, DAWHistorical Med      OneTouch Delica Lancets 61R MISC test UP TO THREE TIMES DAILYHistorical Med      Blood Glucose Monitoring Suppl (ONETOUCH VERIO FLEX SYSTEM) w/Device KIT USE AS DIRECTEDHistorical Med      Alcohol Swabs ( STERILE ALCOHOL PREP) PADS take by Topical route every dayHistorical Med      SPIRIVA RESPIMAT 2.5 MCG/ACT AERS inhaler R-2, DAWHistorical Med      Cholecalciferol (VITAMIN D3) 25 MCG (1000 UT) TABS R-2Historical Med      levothyroxine (SYNTHROID) 50 MCG tablet at bedtime , R-2Historical Med      meloxicam (MOBIC) 15 MG tablet R-2Historical Med       !! - Potential duplicate medications found. Please discuss with provider. ALLERGIES     Patient has no known allergies. FAMILY HISTORY       Family History   Problem Relation Age of Onset    Cancer Mother     Cancer Father     Breast Cancer Neg Hx     Ovarian Cancer Neg Hx           SOCIAL HISTORY       Social History     Socioeconomic History    Marital status:      Spouse name: None    Number of children: None    Years of education: None    Highest education level: None   Tobacco Use    Smoking status: Former     Packs/day: 1.00     Years: 30.00     Pack years: 30.00     Types: Cigarettes    Smokeless tobacco: Never   Vaping Use    Vaping Use: Never used   Substance and Sexual Activity    Alcohol use: No     Comment: occasional    Drug use: Never    Sexual activity: Not Currently       PHYSICAL EXAM       ED Triage Vitals [03/06/23 1621]   BP Temp Temp Source Heart Rate Resp SpO2 Height Weight   122/61 (!) 96.7 °F (35.9 °C) Oral 82 14 95 % 5' 3\" (1.6 m) 240 lb (108.9 kg)       Physical Exam  Vitals and nursing note reviewed. Constitutional:       Appearance: Normal appearance. She is obese. HENT:      Head: Normocephalic and atraumatic. Nose: Nose normal.      Mouth/Throat:      Mouth: Mucous membranes are moist.   Eyes:      Extraocular Movements: Extraocular movements intact. Cardiovascular:      Rate and Rhythm: Normal rate and regular rhythm. Pulmonary:      Effort: Pulmonary effort is normal.      Breath sounds: Normal breath sounds. Abdominal:      Palpations: Abdomen is soft. Musculoskeletal:         General: Normal range of motion. Skin:     General: Skin is warm and dry. Neurological:      General: No focal deficit present. Mental Status: She is alert and oriented to person, place, and time. Vitals:    Vitals:    03/06/23 1621 03/06/23 1826 03/06/23 1845 03/06/23 2105   BP: 122/61 (!) 116/58  (!) 122/58   Pulse: 82 80 89 73   Resp: 14 14 19    Temp: (!) 96.7 °F (35.9 °C)      TempSrc: Oral      SpO2: 95% 95% 95% 96%   Weight: 240 lb (108.9 kg)      Height: 5' 3\" (1.6 m)          DIAGNOSTIC RESULTS     EKG: All EKG's are interpreted by the Emergency Department Physician who either signs or Co-signs this chart in the absence of a cardiologist.    Sinus rhythm. Infrequent PVCs. Ventricular rate of 86. KS is 138. QRS is 86. QTc is 464. There are no abnormal ST elevations or depressions. PVCs are new when compared to prior EKG from February 15, 2023. RADIOLOGY:   Non-plain film images such as CT, Ultrasound and MRI are read by the radiologist. Plain radiographic images are visualized and preliminarily interpreted by the emergency physician with the below findings:    Interpretation per the Radiologist below, if available at the time of this note:    XR CHEST PORTABLE   Final Result   No acute process. LABS:  Labs Reviewed   CBC WITH AUTO DIFFERENTIAL - Abnormal; Notable for the following components:       Result Value    MPV 11.7 (*)     Segs Relative 68.5 (*)     Lymphocytes % 18.4 (*)     Monocytes % 9.8 (*)     Immature Neutrophil % 0.6 (*)     All other components within normal limits   COMPREHENSIVE METABOLIC PANEL - Abnormal; Notable for the following components:    BUN 24 (*)     Glucose 114 (*)     ALT 59 (*)     All other components within normal limits   TROPONIN   MAGNESIUM   TROPONIN       All other labs were within normal range or not returned as of this dictation.     MEDICAL DECISION MAKING     Medications   aluminum & magnesium hydroxide-simethicone (MAALOX) 200-200-20 MG/5ML suspension 30 mL (30 mLs Oral Given 3/6/23 1827)   famotidine (PEPCID) tablet 20 mg (20 mg Oral Given 3/6/23 1827)             [place] Coma Scale  Eye Opening: Spontaneous  Best Verbal Response: Oriented  Best Motor Response: Obeys commands  [place] Coma Scale Score: 15     Heart Score for chest pain patients  History: Slightly Suspicious  ECG: Normal  Patient Age: > 39 and < 65 years  *Risk factors for Atherosclerotic disease: Cigarette smoking  Risk Factors: 1 or 2 risk factors  Troponin: < 1X normal limit  Heart Score Total: 2               CIWA Assessment  BP: (!) 122/58  Heart Rate: 68                 MDM  This is a 52 y.o. female who presents to the emergency department with left-sided chest pain for the approximately 2 to 3 hours. Patient says she been having worsening generalized malaise and weakness over the past 1 to 2 days she says that her breathing appears to be worse when she is at home and improves when she leaves the house. Patient says she was recently discharged from a nursing home and lives in a home where she has concerns about her living conditions. She says she has been sleeping in her car because she is not comfortable sleeping in the house. She says she has been having left-sided chest pain for the past 2 hours. She describes nausea but no vomiting. She denies diarrhea. She denies abdominal pain. She denies new cough. The patient says that she thinks that her living conditions of possible \"mold \"all affecting her brain and as a result, she says that she had a syncopal episode earlier today. The patient says that she passed out at home. She says that she suddenly felt tired and sat down and then woke up approximately 5 to 10 minutes later. After the episode of syncope, the patient says that she called 911 and the ambulance brought her to the emergency department. Review of medical records:  Review of the patient's internal medicine records indicates that she was recently admitted to the hospital and discharged on February 18.   At that time, the patient presented with similar symptoms including syncope, generalized weakness and atypical chest pain as evaluated by cardiology. Patient was also evaluated by cardiology on her last admission and the chest pain spontaneously resolved. They recommended no further work-up after a TTE was completed. Patient was discharged to a skilled nursing facility at that time. Sodium and potassium are normal indicating a low likelihood of hypo or hypernatremia, as well as hypo or hyperkalemia. BUN and creatinine are normal indicating a low likelihood of acute kidney injury or renal failure. Liver function enzymes are normal indicating a low likelihood of acute cholecystitis or hepatitis. Hemoglobin hematocrit are normal indicating no evidence of significant anemia. ECG shows no ST elevations and cardiac enzymes are normal indicating a low likelihood of STEMI or NSTEMI    Chest x-ray was completed and shows no evidence of significant intrathoracic pathology including no evidence of pneumonia, pneumothorax, and a low likelihood of aortic disease. Patient's heart score is 2. She was recently admitted to the hospital, evaluated by cardiology for similar symptoms less than a month ago and there was no definitive cardiac disease risk identified that warranted further work-up. There is no clear indication for admission at this time. I feel the patient is safe for discharge home with follow-up with her outpatient providers as well as cardiology. The patient had concerns about her living conditions and I did have the case management team evaluate her and discuss options. At this time, the patient does not meet the criteria for nursing home placement and she will need to follow-up with outpatient resources for her living situation. Clinical Diagnoses Addressed  1.  Chest pain, unspecified type      CONSULTS:  IP CONSULT TO CASE MANAGEMENT    PROCEDURES:  Unless otherwise noted below, none Procedures      FINAL IMPRESSION      1. Chest pain, unspecified type          DISPOSITION/PLAN   DISPOSITION Decision To Discharge 03/06/2023 09:01:50 PM      PATIENT REFERRED TO:  Ivette Santillan MD  [address]  [id number]  [facility]  [phone]    Call in 1 day      Jonnie Dee MD  [address] [address]  [phone]    Call in 1 day      DISCHARGE MEDICATIONS:  Discharge Medication List as of 3/6/2023  9:04 PM        Controlled Substances Monitoring:     No flowsheet data found.     Pat Will MD (electronically signed)  Attending Emergency Physician            Pat Will MD  03/06/23 1585

## 2023-03-07 NOTE — CARE COORDINATION
CM consult per Dr Mik Aquino to assist with d/c planning. Review of pt chart pt has [facility] Medicare Dual, PCP Lulu Olivo. CM consult is is for SNF placement. CM visited pt who is alert and oriented, sitting up one leg on floor and other bent on bed. CM introduced self and reason for visit. Pt wants to go to SNF rehab, CM ask what she was needing SNF rehab for as she had confirmed she is ambulatory but states she just got out of [facility] for rehab and she was able to ambulate then too. Pt states she lives in an apt that has mold. Pt states she gets anxious when she enters the apt feel fine when she go outside. CM did state it sounded like she may need some  assistance due to her anxiety. Pt states she has a [place] [address] counselor, and they just increased her Prozac dosage. Pt also states she stopped smoking and stopped drinking pepsi 41 days ago and that is also a stressor     CM suggest she contact the Health Department about the mold to see if they can test for black mold. Pt states she called them/health department today, no answer, left a message. Pt wants to return to: 1)[facility]  2) [facility]  3)[facility]    Update to Dr Mik Aquino, he will complete her evaluation, states she is here for CP, may meet admission.  ROHANRN/CM

## 2023-03-08 ENCOUNTER — HOSPITAL ENCOUNTER (EMERGENCY)
Age: 53
Discharge: PSYCHIATRIC OR SUBSTANCE USE FACILITY | End: 2023-03-09
Attending: EMERGENCY MEDICINE
Payer: MEDICARE

## 2023-03-08 DIAGNOSIS — R45.850 HOMICIDAL IDEATIONS: Primary | ICD-10-CM

## 2023-03-08 LAB
ACETAMINOPHEN LEVEL: <5 UG/ML (ref 15–30)
ALCOHOL SCREEN SERUM: <0.01 %WT/VOL
AMPHETAMINES: NEGATIVE
ANION GAP SERPL CALCULATED.3IONS-SCNC: 12 MMOL/L (ref 4–16)
BARBITURATE SCREEN URINE: NEGATIVE
BASOPHILS ABSOLUTE: 0.1 K/CU MM
BASOPHILS RELATIVE PERCENT: 0.5 % (ref 0–1)
BENZODIAZEPINE SCREEN, URINE: NEGATIVE
BUN SERPL-MCNC: 18 MG/DL (ref 6–23)
CALCIUM SERPL-MCNC: 10.1 MG/DL (ref 8.3–10.6)
CANNABINOID SCREEN URINE: NEGATIVE
CHLORIDE BLD-SCNC: 101 MMOL/L (ref 99–110)
CO2: 25 MMOL/L (ref 21–32)
COCAINE METABOLITE: NEGATIVE
CREAT SERPL-MCNC: 0.6 MG/DL (ref 0.6–1.1)
DIFFERENTIAL TYPE: ABNORMAL
DOSE AMOUNT: ABNORMAL
DOSE AMOUNT: ABNORMAL
DOSE TIME: ABNORMAL
DOSE TIME: ABNORMAL
EOSINOPHILS ABSOLUTE: 0.2 K/CU MM
EOSINOPHILS RELATIVE PERCENT: 2.2 % (ref 0–3)
GFR SERPL CREATININE-BSD FRML MDRD: >60 ML/MIN/1.73M2
GLUCOSE BLD-MCNC: 99 MG/DL (ref 70–99)
GLUCOSE SERPL-MCNC: 112 MG/DL (ref 70–99)
HCT VFR BLD CALC: 43.9 % (ref 37–47)
HEMOGLOBIN: 14.5 GM/DL (ref 12.5–16)
IMMATURE NEUTROPHIL %: 0.5 % (ref 0–0.43)
LYMPHOCYTES ABSOLUTE: 2.3 K/CU MM
LYMPHOCYTES RELATIVE PERCENT: 22.4 % (ref 24–44)
MCH RBC QN AUTO: 28.9 PG (ref 27–31)
MCHC RBC AUTO-ENTMCNC: 33 % (ref 32–36)
MCV RBC AUTO: 87.5 FL (ref 78–100)
MONOCYTES ABSOLUTE: 0.7 K/CU MM
MONOCYTES RELATIVE PERCENT: 6.9 % (ref 0–4)
NUCLEATED RBC %: 0 %
OPIATES, URINE: NEGATIVE
OXYCODONE: NEGATIVE
PDW BLD-RTO: 13.1 % (ref 11.7–14.9)
PHENCYCLIDINE, URINE: NEGATIVE
PLATELET # BLD: 198 K/CU MM (ref 140–440)
PMV BLD AUTO: 11.5 FL (ref 7.5–11.1)
POTASSIUM SERPL-SCNC: 3.6 MMOL/L (ref 3.5–5.1)
RAPID INFLUENZA  B AGN: NEGATIVE
RAPID INFLUENZA A AGN: NEGATIVE
RBC # BLD: 5.02 M/CU MM (ref 4.2–5.4)
SALICYLATE LEVEL: <0.3 MG/DL (ref 15–30)
SARS-COV-2 RDRP RESP QL NAA+PROBE: NOT DETECTED
SEGMENTED NEUTROPHILS ABSOLUTE COUNT: 6.9 K/CU MM
SEGMENTED NEUTROPHILS RELATIVE PERCENT: 67.5 % (ref 36–66)
SODIUM BLD-SCNC: 138 MMOL/L (ref 135–145)
SOURCE: NORMAL
TOTAL IMMATURE NEUTOROPHIL: 0.05 K/CU MM
TOTAL NUCLEATED RBC: 0 K/CU MM
WBC # BLD: 10.2 K/CU MM (ref 4–10.5)

## 2023-03-08 PROCEDURE — 84484 ASSAY OF TROPONIN QUANT: CPT

## 2023-03-08 PROCEDURE — G0480 DRUG TEST DEF 1-7 CLASSES: HCPCS

## 2023-03-08 PROCEDURE — 87635 SARS-COV-2 COVID-19 AMP PRB: CPT

## 2023-03-08 PROCEDURE — 93005 ELECTROCARDIOGRAM TRACING: CPT | Performed by: EMERGENCY MEDICINE

## 2023-03-08 PROCEDURE — 82962 GLUCOSE BLOOD TEST: CPT

## 2023-03-08 PROCEDURE — 87804 INFLUENZA ASSAY W/OPTIC: CPT

## 2023-03-08 PROCEDURE — 80048 BASIC METABOLIC PNL TOTAL CA: CPT

## 2023-03-08 PROCEDURE — 80307 DRUG TEST PRSMV CHEM ANLYZR: CPT

## 2023-03-08 PROCEDURE — 99285 EMERGENCY DEPT VISIT HI MDM: CPT

## 2023-03-08 PROCEDURE — 85025 COMPLETE CBC W/AUTO DIFF WBC: CPT

## 2023-03-08 PROCEDURE — 6370000000 HC RX 637 (ALT 250 FOR IP): Performed by: EMERGENCY MEDICINE

## 2023-03-08 RX ORDER — MAGNESIUM HYDROXIDE/ALUMINUM HYDROXICE/SIMETHICONE 120; 1200; 1200 MG/30ML; MG/30ML; MG/30ML
30 SUSPENSION ORAL ONCE
Status: COMPLETED | OUTPATIENT
Start: 2023-03-08 | End: 2023-03-08

## 2023-03-08 RX ORDER — LORAZEPAM 0.5 MG/1
0.5 TABLET ORAL EVERY 6 HOURS PRN
COMMUNITY

## 2023-03-08 RX ORDER — FLUOXETINE 10 MG/1
20 CAPSULE ORAL DAILY
Status: DISCONTINUED | OUTPATIENT
Start: 2023-03-09 | End: 2023-03-08

## 2023-03-08 RX ORDER — ROPINIROLE 0.25 MG/1
0.75 TABLET, FILM COATED ORAL 3 TIMES DAILY
Status: DISCONTINUED | OUTPATIENT
Start: 2023-03-08 | End: 2023-03-09 | Stop reason: HOSPADM

## 2023-03-08 RX ORDER — FLUOXETINE 10 MG/1
20 CAPSULE ORAL DAILY
Status: DISCONTINUED | OUTPATIENT
Start: 2023-03-08 | End: 2023-03-09 | Stop reason: HOSPADM

## 2023-03-08 RX ORDER — HYDROXYZINE HYDROCHLORIDE 25 MG/1
TABLET, FILM COATED ORAL
COMMUNITY
Start: 2023-02-18

## 2023-03-08 RX ORDER — HYDROXYZINE PAMOATE 25 MG/1
25 CAPSULE ORAL ONCE
Status: COMPLETED | OUTPATIENT
Start: 2023-03-08 | End: 2023-03-08

## 2023-03-08 RX ORDER — FAMOTIDINE 20 MG/1
20 TABLET, FILM COATED ORAL ONCE
Status: COMPLETED | OUTPATIENT
Start: 2023-03-08 | End: 2023-03-08

## 2023-03-08 RX ORDER — VITAMIN B COMPLEX
1000 TABLET ORAL DAILY
Status: DISCONTINUED | OUTPATIENT
Start: 2023-03-09 | End: 2023-03-08

## 2023-03-08 RX ORDER — VITAMIN B COMPLEX
1000 TABLET ORAL DAILY
Status: DISCONTINUED | OUTPATIENT
Start: 2023-03-08 | End: 2023-03-09 | Stop reason: HOSPADM

## 2023-03-08 RX ORDER — LORAZEPAM 1 MG/1
1 TABLET ORAL ONCE
Status: COMPLETED | OUTPATIENT
Start: 2023-03-08 | End: 2023-03-08

## 2023-03-08 RX ADMIN — LORAZEPAM 1 MG: 1 TABLET ORAL at 23:30

## 2023-03-08 RX ADMIN — HYDROXYZINE PAMOATE 25 MG: 25 CAPSULE ORAL at 23:31

## 2023-03-08 RX ADMIN — Medication 1000 UNITS: at 23:30

## 2023-03-08 RX ADMIN — ALUMINUM HYDROXIDE, MAGNESIUM HYDROXIDE, AND SIMETHICONE 30 ML: 200; 200; 20 SUSPENSION ORAL at 23:32

## 2023-03-08 RX ADMIN — ROPINIROLE HYDROCHLORIDE 0.75 MG: 0.25 TABLET, FILM COATED ORAL at 23:33

## 2023-03-08 RX ADMIN — FLUOXETINE HYDROCHLORIDE 20 MG: 10 CAPSULE ORAL at 23:30

## 2023-03-08 RX ADMIN — FAMOTIDINE 20 MG: 20 TABLET, FILM COATED ORAL at 23:29

## 2023-03-08 ASSESSMENT — SLEEP AND FATIGUE QUESTIONNAIRES: AVERAGE NUMBER OF SLEEP HOURS: 3

## 2023-03-08 ASSESSMENT — LIFESTYLE VARIABLES
HOW MANY STANDARD DRINKS CONTAINING ALCOHOL DO YOU HAVE ON A TYPICAL DAY: PATIENT DOES NOT DRINK
HOW OFTEN DO YOU HAVE A DRINK CONTAINING ALCOHOL: NEVER

## 2023-03-08 NOTE — ED NOTES
Chief Complaint:      IRMA    Provisional Diagnosis:   Hx anxiety per record  Hx bipolar d/o per record   Suicidal Ideation  Homicidal Ideation     Risk, Psychosocial and Contextual Factors: (homeless, lack of social support etc.):       Current  Treatment:     Had an appointment with [facility] tomorrow but didn't think she could make it     Present Suicidal Behavior:    Verbal:  SI without plan  Attempt:  Denies     Access to Weapons:  Household items     C-SSRS Current Suicide Risk: Low, Moderate or High:      High risk     Past Suicidal Behavior:    Verbal:  Denies   Attempts:  Denies     Self-Injurious/Self-Mutilation: (Specify)  Denies     Traumatic Event Within Past 2 Weeks: (Specify)   Denies     Current Abuse:  (Specify)  Denies     Legal: (Specify)  Denies     Violence: (Specify)  Denies     Protective Factors:    Supportive family     Housing:  Has a home but has recently been sleeping in her car because she thinks her home has black mold     Risk Factors:   Hx anxiety per record  Hx bipolar d/o per record   Suicidal Ideation  Homicidal Ideation     Clinical Summary:    Pt presents to ED for [facility]. Pt 2 daughters at bedside, pt gives consent for them to remain at bedside during assessment. States that she just found out in January that she is a diabetic, she quite smoking and had to change her diet. States she lost 30lbs since then and has had worsening depression and anxiety. States having thoughts of hurting others and increased irritability. States suicidal ideation without plan. Has a home but has been sleeping in her car instead because she thinks her home has black mold. States that every time she goes inside she feels like she is going to [employer] and then when she goes outside she is calm.     SI without plan  HI  Denies AV  AO4  States her appetite is poor and the changes in her diet and recent weight loss has been concerning her   States her sleep is poor and restless since she is sleeping in the car, approximately 3-4 hours of sleep total  Denies drug, alcohol, or tobacco use     Pt calm, cooperative, and friendly   Questionable insight and judgement, daughters have home key are are going to check out the house and pt concerns   Good eye contact    MSW discussed with pt the need for blood and urine and covid/flu swabs. Explained that if nothing is medically concerning then we are going to move forward with inpatient psychiatric admission. Pt verbalized understanding and agreement. States she does not feel safe to be discharged at this time. Level of Care Disposition:      Consulted with medical provider. Patient is medically stabilized. Consulted with patients RN about abnormalities or medical concerns. No abnormalities or medical concerns noted. Consulted with Dr Rocky Jack. Patient to be admitted to inpatient psychiatric unit for safety, stability, observation, and medication management.             JEFF Saleh  03/08/23 5147

## 2023-03-08 NOTE — ED NOTES
[phone] Once all labs are complete and pt is medically clear please reach out to [facility] for assistance with placement.       JEFF Lemon  03/08/23 4072

## 2023-03-08 NOTE — ED PROVIDER NOTES
[address] ENCOUNTER      Pt Name: Pancho Dyer  MRN: [id number]  [place] 1970  Date of evaluation: 3/8/2023  Provider: Mary Nazario MD  Insurance:  Payor: Isabell Speak / Plan: Nanci Mendoza COMPLETE / Product Type: *No Product type* /   Current Code Status   Code Status: Prior     CHIEF COMPLAINT       Chief Complaint   Patient presents with    Homicidal     States having thoughts of hurting others for a few days now. Suicidal         HISTORY OF PRESENT ILLNESS    HPI    Nursing Notes were reviewed. I was the primary medical provider for this patient. This is a 52 y.o. female who presents to the emergency department with Van ideation. The patient states she is having worsening general thoughts of causing harm to others. She has no specific target or individual that she wants to harm but says that she feels like she could kill someone. Patient has no new somatic complaints. I have relayed this patient on  due to ongoing chest pain. The patient had a work-up and evaluation with laboratory testing for her chest pain. Her heart score was 2 at that time and the patient was ultimately discharged home. Patient says that she still having that generalized chest pain but not having any new symptoms from her prior visit.     PAST MEDICAL HISTORY     Past Medical History:   Diagnosis Date    Anxiety     COPD (chronic obstructive pulmonary disease) (HCC)     Hypoglycemia     history    Panic attacks     Primary osteoarthritis of right knee     Separation anxiety     TIA (transient ischemic attack)          SURGICAL HISTORY       Past Surgical History:   Procedure Laterality Date     SECTION      CHOLECYSTECTOMY      HYSTERECTOMY (CERVIX STATUS UNKNOWN)      KNEE CARTILAGE SURGERY      R knee     OVARY REMOVAL      TOTAL KNEE ARTHROPLASTY Right 2022    RIGHT KNEE TOTAL ARTHROPLASTY ROBOTIC performed by Eric Nolen MD at [address]       Previous Medications    ALCOHOL SWABS (HM STERILE ALCOHOL PREP) PADS    take by Topical route every day    ALUMINUM & MAGNESIUM HYDROXIDE-SIMETHICONE (MAALOX) 200-200-20 MG/5ML SUSP SUSPENSION    Take 5 mLs by mouth every 6 hours as needed for Indigestion    ATORVASTATIN (LIPITOR) 40 MG TABLET    Take 1 tablet by mouth nightly    BLOOD GLUCOSE MONITORING SUPPL (ONETOUCH VERIO FLEX SYSTEM) W/DEVICE KIT    USE AS DIRECTED    CHOLECALCIFEROL (VITAMIN D3) 25 MCG (1000 UT) TABS        FLUOXETINE (PROZAC) 20 MG CAPSULE    Take 1 capsule by mouth daily    FLUOXETINE (PROZAC) 40 MG CAPSULE    at bedtime     GABAPENTIN (NEURONTIN) 300 MG CAPSULE    300 mg at bedtime. HYDRALAZINE (APRESOLINE) 25 MG TABLET    Take 25 mg by mouth 4 times daily as needed    HYDROXYZINE HCL (ATARAX) 25 MG TABLET    Take 1 tablet by mouth every 6 hours as needed for Itching AVOID ALCOHOL/CAUSES DROWSINESS    LEVOTHYROXINE (SYNTHROID) 50 MCG TABLET    at bedtime     LORAZEPAM (ATIVAN) 0.5 MG TABLET    Take 0.5 mg by mouth every 6 hours as needed for Anxiety. MELOXICAM (MOBIC) 15 MG TABLET        NICOTINE (NICODERM CQ) 7 MG/24HR    Place 1 patch onto the skin daily    OMEPRAZOLE (PRILOSEC) 20 MG DELAYED RELEASE CAPSULE    Take 2 capsules by mouth Daily    ONETOUCH DELICA LANCETS 12S MISC    test UP TO THREE TIMES DAILY    ONETOUCH VERIO STRIP    test 2 - 3 times a day by Transdermal route as directed    ROPINIROLE (REQUIP) 0.25 MG TABLET    TAKE 3 TABLETS BY MOUTH 2 HOURS BEFORE BED    SPIRIVA RESPIMAT 2.5 MCG/ACT AERS INHALER           ALLERGIES     Patient has no known allergies.     FAMILY HISTORY       Family History   Problem Relation Age of Onset    Cancer Mother     Cancer Father     Breast Cancer Neg Hx     Ovarian Cancer Neg Hx           SOCIAL HISTORY       Social History     Socioeconomic History    Marital status:      Spouse name: None    Number of children: None    Years of education: None    Highest education level: None   Tobacco Use    Smoking status: Former     Packs/day: 1.00     Years: 30.00     Pack years: 30.00     Types: Cigarettes    Smokeless tobacco: Never   Vaping Use    Vaping Use: Never used   Substance and Sexual Activity    Alcohol use: No     Comment: occasional    Drug use: Never    Sexual activity: Not Currently       PHYSICAL EXAM       ED Triage Vitals [03/08/23 1652]   BP Temp Temp src Heart Rate Resp SpO2 Height Weight   (!) 133/56 97.8 °F (36.6 °C) -- 93 18 98 % -- --       Physical Exam  Vitals reviewed. Constitutional:       Appearance: Normal appearance. She is obese. HENT:      Head: Normocephalic and atraumatic. Nose: Nose normal.      Mouth/Throat:      Mouth: Mucous membranes are moist.   Eyes:      Extraocular Movements: Extraocular movements intact. Cardiovascular:      Rate and Rhythm: Normal rate and regular rhythm. Pulmonary:      Effort: Pulmonary effort is normal.   Musculoskeletal:         General: Normal range of motion. Skin:     General: Skin is warm and dry. Capillary Refill: Capillary refill takes less than 2 seconds. Neurological:      Mental Status: She is alert and oriented to person, place, and time. Psychiatric:         Thought Content: Thought content includes homicidal ideation. Thought content does not include homicidal plan. Vitals:    Vitals:    03/08/23 1652   BP: (!) 133/56   Pulse: 93   Resp: 18   Temp: 97.8 °F (36.6 °C)   SpO2: 98%       DIAGNOSTIC RESULTS     EKG: All EKG's are interpreted by the Emergency Department Physician who either signs or Co-signs this chart in the absence of a cardiologist.    Normal sinus rhythm. Ventricular rate of 72. AK is 140. QRS is 92. QTc is 431. There are no abnormal ST elevations or depressions. There is no ectopy.   There is no significant change from prior EKG from February 2023.    RADIOLOGY:   Non-plain film images such as CT, Ultrasound and MRI are read by the radiologist. Plain radiographic images are visualized and preliminarily interpreted by the emergency physician with the below findings:    Interpretation per the Radiologist below, if available at the time of this note:    No orders to display       LABS:  [address] - Abnormal; Notable for the following components:       Result Value    Glucose 112 (*)     All other components within normal limits   CBC WITH AUTO DIFFERENTIAL - Abnormal; Notable for the following components:    MPV 11.5 (*)     Segs Relative 67.5 (*)     Lymphocytes % 22.4 (*)     Monocytes % 6.9 (*)     Immature Neutrophil % 0.5 (*)     All other components within normal limits   ACETAMINOPHEN LEVEL - Abnormal; Notable for the following components:    Acetaminophen Level <5.0 (*)     All other components within normal limits   SALICYLATE LEVEL - Abnormal; Notable for the following components:    Salicylate Lvl <6.7 (*)     All other components within normal limits   COVID-19, RAPID   RAPID INFLUENZA A/B ANTIGENS   ETHANOL   URINE DRUG SCREEN   TROPONIN   POCT GLUCOSE       All other labs were within normal range or not returned as of this dictation.     MEDICAL DECISION MAKING     Medications   hydrOXYzine pamoate (VISTARIL) capsule 25 mg (has no administration in time range)   LORazepam (ATIVAN) tablet 1 mg (has no administration in time range)   aluminum & magnesium hydroxide-simethicone (MAALOX) 200-200-20 MG/5ML suspension 30 mL (has no administration in time range)   famotidine (PEPCID) tablet 20 mg (has no administration in time range)             Aryan Coma Scale  Eye Opening: Spontaneous  Best Verbal Response: Oriented  Best Motor Response: Obeys commands  [place] Coma Scale Score: 15                     WA Assessment  BP: (!) 133/56  Heart Rate: 93                 MDM  I was the primary medical provider for this patient. This is a 52 y.o. female who presents to the emergency department with Van ideation. The patient states she is having worsening general thoughts of causing harm to others. She has no specific target or individual that she wants to harm but says that she feels like she could kill someone. Patient has no new somatic complaints. I have relayed this patient on March 6 due to ongoing chest pain. The patient had a work-up and evaluation with laboratory testing for her chest pain. Her heart score was 2 at that time and the patient was ultimately discharged home. Patient says that she still having that generalized chest pain but not having any new symptoms from her prior visit. From an emergency medicine standpoint, the patient is medically clear for psychiatric evaluation and admission if deemed appropriate    Psychiatric evaluation is still pending, this dictation. The patient will be signed out to Dr. Christian Freed for final disposition. Please see his documentation for the final impression and plan on this patient. Clinical Diagnoses Addressed  1. Homicidal ideations              CONSULTS:  IP CONSULT TO PSYCHIATRY    PROCEDURES:  Unless otherwise noted below, none     Procedures      FINAL IMPRESSION      1. Homicidal ideations          DISPOSITION/PLAN   DISPOSITION  03/08/2023 08:00:54 PM      PATIENT REFERRED TO:  No follow-up provider specified. DISCHARGE MEDICATIONS:  New Prescriptions    No medications on file     Controlled Substances Monitoring:     No flowsheet data found.     Sherry Price MD (electronically signed)  Attending Emergency Physician            Sherry Price MD  03/08/23 2044

## 2023-03-08 NOTE — ED NOTES
Pt states she is homicidal and suicidal but does not have a plan currently. Pt states she has been feeling this way for a few days. Family states pt has a house but the pt has been sleeping in her car at night due to thinking their is mold inside. Pt is a/o and cooperative.   Sitter 1:1 at bedside  Kirstie Barbour RN  03/08/23 0406 [facility] Bob, RN  03/08/23 4591

## 2023-03-08 NOTE — ED NOTES
Sarita Guallpa : [phone] or [phone]    Home [employer]er : [phone] (would like to be called first)    Son : [phone]    Coit Nicks : [phone]     Jaki Breath  03/08/23 1721

## 2023-03-09 VITALS
SYSTOLIC BLOOD PRESSURE: 126 MMHG | DIASTOLIC BLOOD PRESSURE: 68 MMHG | OXYGEN SATURATION: 99 % | TEMPERATURE: 98.3 F | HEART RATE: 78 BPM | RESPIRATION RATE: 18 BRPM

## 2023-03-09 LAB
EKG ATRIAL RATE: 86 BPM
EKG DIAGNOSIS: NORMAL
EKG P AXIS: 57 DEGREES
EKG P-R INTERVAL: 138 MS
EKG Q-T INTERVAL: 388 MS
EKG QRS DURATION: 86 MS
EKG QTC CALCULATION (BAZETT): 464 MS
EKG R AXIS: -17 DEGREES
EKG T AXIS: 23 DEGREES
EKG VENTRICULAR RATE: 86 BPM
GLUCOSE BLD-MCNC: 90 MG/DL (ref 70–99)
GLUCOSE BLD-MCNC: 90 MG/DL (ref 70–99)
TROPONIN T: <0.01 NG/ML

## 2023-03-09 PROCEDURE — 82962 GLUCOSE BLOOD TEST: CPT

## 2023-03-09 PROCEDURE — 93010 ELECTROCARDIOGRAM REPORT: CPT | Performed by: INTERNAL MEDICINE

## 2023-03-09 PROCEDURE — 6370000000 HC RX 637 (ALT 250 FOR IP): Performed by: EMERGENCY MEDICINE

## 2023-03-09 RX ORDER — NICOTINE 21 MG/24HR
1 PATCH, TRANSDERMAL 24 HOURS TRANSDERMAL ONCE
Status: DISCONTINUED | OUTPATIENT
Start: 2023-03-09 | End: 2023-03-09

## 2023-03-09 NOTE — ED NOTES
5674 Call from [facility], pt accepted at [address] Physician: Dr Chanell Gomez: Jamie Deaner: [phone] is scheduling transport and states they already have received  the pink slip fax     2200 Call from [facility], pt is actually accepted to [address] ETA 1005    1003 Call from pt daughter asking to speak to pt. MSW transferred call to a mobile phone and gave it to pt.      JEFF Lemon  03/09/23 1003

## 2023-03-09 NOTE — ED NOTES
Patient is calm and cooperative at this time. Patient is resting with eyes closed. Patient is in green gown and has a sitter at bedside. Will continue to monitor.       Jj Mcduffie RN  03/09/23 4253

## 2023-03-09 NOTE — ED NOTES
Took over care for this patient. Patient is calm and cooperative at this time. Patient is resting with eyes closed. Patient is in green gown and has a sitter at bedside. Will continue to monitor.       Kris Fierro RN  03/09/23 9117

## 2023-03-09 NOTE — ED PROVIDER NOTES
Kirstie Farrell was checked out to me by Dr. Arabella Chase. Please see his/her initial documentation for details of the patient's ED presentation, physical exam and completed studies. In brief, Kirstie Farrell is a 52 y.o. female that presents with increased depression, anxiety, paranoia as well as suicidal and homicidal ideation.     Labs  Results for orders placed or performed during the hospital encounter of 03/08/23   COVID-19, Rapid    Specimen: Nasopharyngeal   Result Value Ref Range    Source NARES     SARS-CoV-2, NAAT NOT DETECTED NOT DETECTED   Rapid Flu Swab    Specimen: Nasopharyngeal   Result Value Ref Range    Rapid Influenza A Ag NEGATIVE NEGATIVE    Rapid Influenza B Ag NEGATIVE NEGATIVE   BMP   Result Value Ref Range    Sodium 138 135 - 145 MMOL/L    Potassium 3.6 3.5 - 5.1 MMOL/L    Chloride 101 99 - 110 mMol/L    CO2 25 21 - 32 MMOL/L    Anion Gap 12 4 - 16    BUN 18 6 - 23 MG/DL    Creatinine 0.6 0.6 - 1.1 MG/DL    Est, Glom Filt Rate >60 >60 mL/min/1.73m2    Glucose 112 (H) 70 - 99 MG/DL    Calcium 10.1 8.3 - 10.6 MG/DL   CBC with Auto Differential   Result Value Ref Range    WBC 10.2 4.0 - 10.5 K/CU MM    RBC 5.02 4.2 - 5.4 M/CU MM    Hemoglobin 14.5 12.5 - 16.0 GM/DL    Hematocrit 43.9 37 - 47 %    MCV 87.5 78 - 100 FL    MCH 28.9 27 - 31 PG    MCHC 33.0 32.0 - 36.0 %    RDW 13.1 11.7 - 14.9 %    Platelets 222 113 - 803 K/CU MM    MPV 11.5 (H) 7.5 - 11.1 FL    Differential Type AUTOMATED DIFFERENTIAL     Segs Relative 67.5 (H) 36 - 66 %    Lymphocytes % 22.4 (L) 24 - 44 %    Monocytes % 6.9 (H) 0 - 4 %    Eosinophils % 2.2 0 - 3 %    Basophils % 0.5 0 - 1 %    Segs Absolute 6.9 K/CU MM    Lymphocytes Absolute 2.3 K/CU MM    Monocytes Absolute 0.7 K/CU MM    Eosinophils Absolute 0.2 K/CU MM    Basophils Absolute 0.1 K/CU MM    Nucleated RBC % 0.0 %    Total Nucleated RBC 0.0 K/CU MM    Total Immature Neutrophil 0.05 K/CU MM    Immature Neutrophil % 0.5 (H) 0 - 0.43 %   ETOH   Result Value Ref Range    Alcohol Scrn <0.01 <0.01 %WT/VOL   Urine Drug Screen   Result Value Ref Range    Cannabinoid Scrn, Ur NEGATIVE NEGATIVE    Amphetamines NEGATIVE NEGATIVE    Cocaine Metabolite NEGATIVE NEGATIVE    Benzodiazepine Screen, Urine NEGATIVE NEGATIVE    Barbiturate Screen, Ur NEGATIVE NEGATIVE    Opiates, Urine NEGATIVE NEGATIVE    Phencyclidine, Urine NEGATIVE NEGATIVE    Oxycodone NEGATIVE NEGATIVE   Acetaminophen Level   Result Value Ref Range    Acetaminophen Level <5.0 (L) 15 - 30 ug/ml    DOSE AMOUNT DOSE AMT. GIVEN - UNKNOWN     DOSE TIME DOSE TIME GIVEN - UNKNOWN    Salicylate   Result Value Ref Range    Salicylate Lvl <1.7 (L) 15 - 30 MG/DL    DOSE AMOUNT DOSE AMT. GIVEN - UNKNOWN     DOSE TIME DOSE TIME GIVEN - UNKNOWN    Troponin   Result Value Ref Range    Troponin T <0.010 <0.01 NG/ML   POCT Glucose   Result Value Ref Range    POC Glucose 99 70 - 99 MG/DL   POCT Glucose   Result Value Ref Range    POC Glucose 90 70 - 99 MG/DL   EKG 12 Lead   Result Value Ref Range    Ventricular Rate 72 BPM    Atrial Rate 72 BPM    P-R Interval 140 ms    QRS Duration 92 ms    Q-T Interval 394 ms    QTc Calculation (Bazett) 431 ms    P Axis 54 degrees    R Axis 9 degrees    T Axis 23 degrees    Diagnosis       Normal sinus rhythm  Normal ECG  When compared with ECG of 06-MAR-2023 16:33,  premature ventricular complexes are no longer present  Minimal criteria for Anterior infarct are no longer present         MDM:  Patient endorsed to me pending placement for the above. Patient has been medically cleared by prior provider. Patient is mental precautions with sitter. Patient is pink slipped. Awaiting placement at this time. ------  Patient is accepted to mental health [facility] by Dr. Jackelin Rodriguez. Transportation to be arranged and patient to be transferred. Final Impression:  1.  Homicidal ideations          Please note that portions of this note may have been complete with a voice recognition program. Efforts were made to edit the dictations, but occasional words are mis-transcribed.           Veran Pittman MD  03/09/23 1998

## 2023-03-09 NOTE — ED NOTES
[address] [facility], [address] [place] [address]  03/09/23 0942

## 2023-03-09 NOTE — ED NOTES
Called report to [facility], transportation is here for patient.       Waylon Beckman RN  03/09/23 1036

## 2023-03-11 LAB
EKG ATRIAL RATE: 72 BPM
EKG DIAGNOSIS: NORMAL
EKG P AXIS: 54 DEGREES
EKG P-R INTERVAL: 140 MS
EKG Q-T INTERVAL: 394 MS
EKG QRS DURATION: 92 MS
EKG QTC CALCULATION (BAZETT): 431 MS
EKG R AXIS: 9 DEGREES
EKG T AXIS: 23 DEGREES
EKG VENTRICULAR RATE: 72 BPM

## 2023-03-11 PROCEDURE — 93010 ELECTROCARDIOGRAM REPORT: CPT | Performed by: INTERNAL MEDICINE

## 2023-05-12 DIAGNOSIS — R00.2 PALPITATION: ICD-10-CM

## 2024-03-20 NOTE — TELEPHONE ENCOUNTER
----- Message from Lesly Rees sent at 3/20/2024  9:40 AM CDT -----  Type: Needs Medical Advice  Who Called:  Jessica (spouse)  Symptoms (please be specific):    How long has patient had these symptoms:    Pharmacy name and phone #:    Best Call Back Number: [phone]  Additional Information: Jessica is requesting a call back to reschedule her  appt..        pt is admitted a\t the hospital  and was supposed to have a event monitor applied on 2/21 . She stated the dr still wants her to have the monitor. she will be going to [address] home after hospital  stay     She will call Monday 2/20 to verify that she was discharged and IS at [facility]  Please get with [employer] and have a monitor mailed to [facility] for pt. Michelle Davenport

## (undated) DEVICE — GLOVE SURG SZ 8 CRM LTX FREE POLYISOPRENE POLYMER BEAD ANTI

## (undated) DEVICE — SUTURE STRATAFIX SPRL SZ 1 L14IN ABSRB VLT L48CM CTX 1/2 SXPD2B405

## (undated) DEVICE — TOWEL,OR,DSP,ST,BLUE,STD,6/PK,12PK/CS: Brand: MEDLINE

## (undated) DEVICE — PAD CLD R HIP REG HOSE NONSTERILE

## (undated) DEVICE — GLOVE SURG SZ 65 L12IN FNGR THK79MIL GRN LTX FREE

## (undated) DEVICE — KIT DRP FOR RIO ROBOTIC ARM ASST SYS

## (undated) DEVICE — COVER,TABLE,44X90,STERILE: Brand: MEDLINE

## (undated) DEVICE — SPONGE LAP W18XL18IN WHT COT 4 PLY FLD STRUNG RADPQ DISP ST

## (undated) DEVICE — CHLORAPREP 26ML ORANGE

## (undated) DEVICE — DUAL CUT SAGITTAL BLADE

## (undated) DEVICE — KIT TRK KNEE PROC VIZADISC

## (undated) DEVICE — Device: Brand: POWER-FLO®

## (undated) DEVICE — CLASSIC CLD THER SYS

## (undated) DEVICE — BOOT POS LEG DEMAYO

## (undated) DEVICE — SUTURE ABSORBABLE 3-0 PS-1 18 IN UD MONOCRYL + STRATAFIX SXMP1B102

## (undated) DEVICE — SYSTEM SKIN CLSR 22CM DERMBND PRINEO

## (undated) DEVICE — BLADE SURG SAW S STL NAR OSC W/ SERR EDGE DISP

## (undated) DEVICE — SYRINGE MED 30ML STD CLR PLAS LUERLOCK TIP N CTRL DISP

## (undated) DEVICE — SURGICAL PROCEDURE PACK TOT KNEE LF

## (undated) DEVICE — SOLUTION IV 100ML 0.9% SOD CHL PLAS CONT USP VIAFLX 1 PER

## (undated) DEVICE — 4-PORT MANIFOLD: Brand: NEPTUNE 2

## (undated) DEVICE — GLOVE SURG SZ 6 CRM LTX FREE POLYISOPRENE POLYMER BEAD ANTI

## (undated) DEVICE — NEEDLE HYPO 20GA L1.5IN YEL POLYPR HUB S STL REG BVL STR

## (undated) DEVICE — STOCKING,ANTI-EMBOLISM,T-L,XL REG,LF: Brand: MEDLINE

## (undated) DEVICE — KIT INT FIX FEM TIB CKPT MAKOPLASTY

## (undated) DEVICE — PIN BNE FIX L110MM DIA32MM

## (undated) DEVICE — ZIMMER® STERILE DISPOSABLE TOURNIQUET CUFF WITH PLC, DUAL PORT, SINGLE BLADDER, 34 IN. (86 CM)

## (undated) DEVICE — CORD RETRCT SIL

## (undated) DEVICE — PIN BNE FIX TEMP L140MM DIA4MM MAKO

## (undated) DEVICE — PENCIL ES CRD L10FT HND SWCHING ROCK SWCH W/ EDGE COAT BLDE

## (undated) DEVICE — SUTURE VCRL SZ 2-0 L18IN ABSRB UD CT-1 L36MM 1/2 CIR J839D

## (undated) DEVICE — BLADE SURG SAW STD S STL OSC W/ SERR EDGE DISP

## (undated) DEVICE — HOOD, PEEL-AWAY: Brand: FLYTE

## (undated) DEVICE — GLOVE SURG SZ 65 THK91MIL LTX FREE SYN POLYISOPRENE

## (undated) DEVICE — GLOVE ORANGE PI 7 1/2   MSG9075

## (undated) DEVICE — 3M™ STERI-DRAPE™ U-DRAPE 1015: Brand: STERI-DRAPE™